# Patient Record
Sex: MALE | Race: WHITE | NOT HISPANIC OR LATINO | Employment: OTHER | URBAN - METROPOLITAN AREA
[De-identification: names, ages, dates, MRNs, and addresses within clinical notes are randomized per-mention and may not be internally consistent; named-entity substitution may affect disease eponyms.]

---

## 2017-05-16 RX ORDER — PREDNISONE 20 MG/1
TABLET ORAL
Qty: 15 TABLET | Refills: 0 | Status: SHIPPED | OUTPATIENT
Start: 2017-05-16 | End: 2018-08-20 | Stop reason: CLARIF

## 2017-06-26 ENCOUNTER — TELEPHONE (OUTPATIENT)
Dept: NEUROSURGERY | Facility: CLINIC | Age: 67
End: 2017-06-26

## 2017-06-26 DIAGNOSIS — M54.5 ACUTE MIDLINE LOW BACK PAIN, WITH SCIATICA PRESENCE UNSPECIFIED: Primary | ICD-10-CM

## 2017-06-26 NOTE — TELEPHONE ENCOUNTER
----- Message from Loly Trevino sent at 6/26/2017 12:19 PM CDT -----  Contact: pt [phone]  Pt is calling to schedule an appt.pls call

## 2017-06-26 NOTE — TELEPHONE ENCOUNTER
RN spoke with patient.Scheduled xray and appointment to see GRGEORIA Delgado. Location and directions provided [facility].

## 2017-06-30 ENCOUNTER — HOSPITAL ENCOUNTER (OUTPATIENT)
Dept: RADIOLOGY | Facility: OTHER | Age: 67
Discharge: HOME | End: 2017-06-30
Attending: NEUROLOGICAL SURGERY
Payer: COMMERCIAL

## 2017-06-30 ENCOUNTER — OFFICE VISIT (OUTPATIENT)
Dept: SPINE | Facility: CLINIC | Age: 67
End: 2017-06-30
Payer: COMMERCIAL

## 2017-06-30 VITALS
WEIGHT: 308 LBS | SYSTOLIC BLOOD PRESSURE: 143 MMHG | HEART RATE: 80 BPM | HEIGHT: 73 IN | DIASTOLIC BLOOD PRESSURE: 91 MMHG | BODY MASS INDEX: 40.82 KG/M2

## 2017-06-30 DIAGNOSIS — M54.14 THORACIC AND LUMBOSACRAL NEURITIS: ICD-10-CM

## 2017-06-30 DIAGNOSIS — M54.5 ACUTE MIDLINE LOW BACK PAIN, WITH SCIATICA PRESENCE UNSPECIFIED: ICD-10-CM

## 2017-06-30 DIAGNOSIS — G89.29 CHRONIC BILATERAL LOW BACK PAIN WITHOUT SCIATICA: ICD-10-CM

## 2017-06-30 DIAGNOSIS — M54.17 THORACIC AND LUMBOSACRAL NEURITIS: ICD-10-CM

## 2017-06-30 DIAGNOSIS — M54.50 CHRONIC BILATERAL LOW BACK PAIN WITHOUT SCIATICA: ICD-10-CM

## 2017-06-30 DIAGNOSIS — M47.819 SPONDYLOSIS WITHOUT MYELOPATHY: ICD-10-CM

## 2017-06-30 DIAGNOSIS — M51.37 DDD (DEGENERATIVE DISC DISEASE), LUMBOSACRAL: ICD-10-CM

## 2017-06-30 PROCEDURE — 72120 X-RAY BEND ONLY L-S SPINE: CPT | Mod: 26,,, | Performed by: RADIOLOGY

## 2017-06-30 PROCEDURE — 99214 OFFICE O/P EST MOD 30 MIN: CPT | Mod: S$GLB,,, | Performed by: PHYSICIAN ASSISTANT

## 2017-06-30 PROCEDURE — 72100 X-RAY EXAM L-S SPINE 2/3 VWS: CPT | Mod: 26,,, | Performed by: RADIOLOGY

## 2017-06-30 PROCEDURE — 99999 PR PBB SHADOW E&M-EST. PATIENT-LVL III: CPT | Mod: PBBFAC,,, | Performed by: PHYSICIAN ASSISTANT

## 2017-06-30 PROCEDURE — 72120 X-RAY BEND ONLY L-S SPINE: CPT | Mod: TC

## 2017-06-30 RX ORDER — METHOCARBAMOL 750 MG/1
750 TABLET, FILM COATED ORAL 3 TIMES DAILY PRN
Qty: 90 TABLET | Refills: 1 | Status: SHIPPED | OUTPATIENT
Start: 2017-06-30 | End: 2017-07-30

## 2017-06-30 RX ORDER — METHYLPREDNISOLONE 4 MG/1
TABLET ORAL
Qty: 1 PACKAGE | Refills: 0 | Status: SHIPPED | OUTPATIENT
Start: 2017-06-30 | End: 2017-07-21

## 2017-06-30 NOTE — PROGRESS NOTES
"Subjective:     Patient ID:  Jarrett Blanco is a 67 y.o. male.      Chief Complaint: Back and right leg pain    DATE OF PROCEDURE: 4/22/2015      PREOPERATIVE DIAGNOSES:   Spinal stenosis, lumbar region, without neurogenic claudication [724.02]     POSTOPERATIVE DIAGNOSES:   Spinal stenosis, lumbar region, without neurogenic claudication [724.02]     PROCEDURES PERFORMED:   Procedure(s) (LRB):  LAMINOTOMY- left L4/5 and L5/S1 laminoforaminotomy (Left)     Surgeon(s) and Role:     * Larry An MD - Primary  Assistant- Cait Vivar (there was no neurosurgery resident available to assist in the surgery)    HPI    Jarrett Blanco is a 67 y.o. male who presents for follow up.  He has persisted with back pain on and off since his surgery inn 2015.  About a week ago he woke up with severe pain in the low back that has been constant and worse with walking and standing and better with sitting.  He will take 2 Aleve which helps some and as the day goes on the pain eases up.  Some pain in the right lateral leg to the mid thigh region.      No PT or ESIs since his last surgery.  His low back pain limits what he can and can't do.    Pain rated 6/10.    Patient denies any recent accidents or trauma, no saddle anesthesias, and no bowel or bladder incontinence.      Review of Systems:  Constitution: Negative for chills, fever, night sweats and weight loss.   Musculoskeletal: Negative for falls.   Gastrointestinal: Negative for bowel incontinence, nausea and vomiting.   Genitourinary: Negative for bladder incontinence.   Neurological: Negative for disturbances in coordination and loss of balance.     Objective:      Vitals:    06/30/17 1445   BP: (!) 143/91   Pulse: 80   Weight: (!) 139.7 kg (308 lb)   Height: 6' 1" (1.854 m)   PainSc:   6   PainLoc: Back         Physical Exam:    General:  Jarrett Blanco is well-developed, well-nourished, appears stated age, in no acute distress, alert and oriented to person, place, and " time.    Pulmonary/Chest:  Respiratory effort normal  Abdominal: Exhibits no distension  Psychiatric:  Normal mood and affect.  Behavior is normal.  Judgement and thought content normal    Musculoskeletal:    Patient arises from a sitting to standing position without difficulty.  Patient walks to the door without evidence of limp, pain, or abnormality of gait. Patient is able to walk on heels and toes without difficulty.    Lumbar ROM:   Pain in lumbar flexion, extension, and left lateral bending.  No pain in right lateral bending.    Straight Leg Raise:  Negative right and left SLR.    Neurological:  Alert and oriented to person, place, and time    Muscle strength against resistance:     Right Left   Hip flexion  5 / 5 5 / 5   Hip extension 5 / 5 5 / 5   Hip abduction 5 / 5 5 / 5   Hip adduction  5 / 5 5 / 5   Knee extension  5 / 5 5 / 5   Knee flexion 5 / 5 5 / 5   Dorsiflexion  5 / 5 5 / 5   EHL  5 / 5 5 / 5   Plantar flexion  5 / 5 5 / 5   Inversion of the feet 5 / 5 5 / 5   Eversion of the feet  5 / 5 5 / 5     Reflexes:     Right Left   Patellar 2+ 2+   Achilles 2+ 2+     Clonus:  Negative bilaterally    On gross examination of the bilateral upper extremities, patient has full painfree ROM with no signs of clubbing, cyanosis, edema, or weakness.     XRAY Interpretation:     Lumbar spine ap/lateral/flexion/extension xrays were personally reviewed today.  No fractures.  No movement on flexion and extension.  Severe DDD at L4-5 and L5-S1.  Moderate DDD at L3-4.    Assessment:          1. Spondylosis without myelopathy    2. DDD (degenerative disc disease), lumbosacral    3. Thoracic and lumbosacral neuritis    4. Chronic bilateral low back pain without sciatica             Plan:          Orders Placed This Encounter    methylPREDNISolone (MEDROL, [place],) 4 mg tablet    methocarbamol (ROBAXIN) 750 MG Tab       L4-5, L5-S1 DDD/spondylosis.  Chronic low back pain with an acute exacerbation    -Medrol pack now with  food  -Continue Aleve OTC PRN with food once the pack is done  -Robaxin PRN now  -He is not interested in PT or ESIs in the future as they did not work for him in the past  -Consider MIS L4-5, L5-S1 and possible L3-4 spinal fusion.  If he wants to talk to Dr. An about this in the future I will order an updated CT lumbar spine without contrast and have him see Dr. An after to discuss surgical options    Follow-Up:  Return if symptoms worsen or fail to improve. If there are any questions prior to this, the patient was instructed to contact the office.       COLLEEN Mendiola, PA-C  Neurosurgery  [facility]  [facility]

## 2017-08-24 DIAGNOSIS — Z11.59 NEED FOR HEPATITIS C SCREENING TEST: ICD-10-CM

## 2018-05-30 ENCOUNTER — TELEPHONE (OUTPATIENT)
Dept: NEUROSURGERY | Facility: CLINIC | Age: 68
End: 2018-05-30

## 2018-05-30 NOTE — TELEPHONE ENCOUNTER
Offered appt, 7/10/18 at 930. Patient agreed to appt date, time and location. Appt letter mailed to address on file.

## 2018-05-30 NOTE — TELEPHONE ENCOUNTER
----- Message from Partha Ordonez sent at 5/30/2018  8:52 AM CDT -----  Contact: Maria G ( spouse ) @ [phone]  Caller is calling to schedule a f/u appt w Dr An to discuss surgery, pls call

## 2018-07-05 ENCOUNTER — TELEPHONE (OUTPATIENT)
Dept: SPINE | Facility: CLINIC | Age: 68
End: 2018-07-05

## 2018-07-05 DIAGNOSIS — M54.9 BACK PAIN, UNSPECIFIED BACK LOCATION, UNSPECIFIED BACK PAIN LATERALITY, UNSPECIFIED CHRONICITY: Primary | ICD-10-CM

## 2018-07-05 NOTE — TELEPHONE ENCOUNTER
I saw patient last year.  He needs updated CT lumbar spine scheduled Friday or Monday before his surgery consult with Dr. An this Tuesday.    Please schedule.    COLLEEN Mendiola, PA-C  Neurosurgery  [facility]  [facility]  Pager:  [phone]

## 2018-07-06 ENCOUNTER — HOSPITAL ENCOUNTER (OUTPATIENT)
Dept: RADIOLOGY | Facility: HOSPITAL | Age: 68
Discharge: HOME | End: 2018-07-06
Attending: PHYSICIAN ASSISTANT
Payer: MEDICARE

## 2018-07-06 DIAGNOSIS — M54.9 BACK PAIN, UNSPECIFIED BACK LOCATION, UNSPECIFIED BACK PAIN LATERALITY, UNSPECIFIED CHRONICITY: ICD-10-CM

## 2018-07-06 PROCEDURE — 72131 CT LUMBAR SPINE W/O DYE: CPT | Mod: 26,,, | Performed by: RADIOLOGY

## 2018-07-06 PROCEDURE — 72131 CT LUMBAR SPINE W/O DYE: CPT | Mod: TC

## 2018-07-10 ENCOUNTER — OFFICE VISIT (OUTPATIENT)
Dept: SPINE | Facility: CLINIC | Age: 68
End: 2018-07-10
Attending: NEUROLOGICAL SURGERY
Payer: MEDICARE

## 2018-07-10 VITALS
TEMPERATURE: 96 F | BODY MASS INDEX: 40.82 KG/M2 | SYSTOLIC BLOOD PRESSURE: 150 MMHG | HEIGHT: 73 IN | HEART RATE: 61 BPM | DIASTOLIC BLOOD PRESSURE: 82 MMHG | WEIGHT: 308 LBS

## 2018-07-10 DIAGNOSIS — M48.062 SPINAL STENOSIS OF LUMBAR REGION WITH NEUROGENIC CLAUDICATION: ICD-10-CM

## 2018-07-10 DIAGNOSIS — M51.36 DDD (DEGENERATIVE DISC DISEASE), LUMBAR: Primary | ICD-10-CM

## 2018-07-10 DIAGNOSIS — M43.17 SPONDYLOLISTHESIS OF LUMBOSACRAL REGION: ICD-10-CM

## 2018-07-10 PROCEDURE — 99215 OFFICE O/P EST HI 40 MIN: CPT | Mod: S$GLB,,, | Performed by: NEUROLOGICAL SURGERY

## 2018-07-10 PROCEDURE — 3079F DIAST BP 80-89 MM HG: CPT | Mod: CPTII,S$GLB,, | Performed by: NEUROLOGICAL SURGERY

## 2018-07-10 PROCEDURE — 99999 PR PBB SHADOW E&M-EST. PATIENT-LVL III: CPT | Mod: PBBFAC,,, | Performed by: NEUROLOGICAL SURGERY

## 2018-07-10 PROCEDURE — 3077F SYST BP >= 140 MM HG: CPT | Mod: CPTII,S$GLB,, | Performed by: NEUROLOGICAL SURGERY

## 2018-07-10 PROCEDURE — 99499 UNLISTED E&M SERVICE: CPT | Mod: S$GLB,,, | Performed by: NEUROLOGICAL SURGERY

## 2018-07-10 RX ORDER — NAPROXEN SODIUM 220 MG
220 TABLET ORAL DAILY PRN
Status: ON HOLD | COMMUNITY
End: 2018-09-13 | Stop reason: HOSPADM

## 2018-07-10 RX ORDER — HYDROCHLOROTHIAZIDE 12.5 MG/1
12.5 CAPSULE ORAL EVERY MORNING
COMMUNITY
Start: 2018-07-02 | End: 2020-09-28

## 2018-07-10 NOTE — PROGRESS NOTES
CHIEF COMPLAINT:    Follow-up: Low Back Pain    HPI:    Jarrett Blanco is a 68 y.o.-year-old male who presents for neurosurgical follow-up. GREGORIA Cervantes last saw Jarrett Blanco in June 2017, and at that time he was complaining of pain across the whole lower back. He was sent for  physical therapy, which did not improve the pain. The patient's symptoms are now worse. Today He is complaining of pain across the whole lower back moving into the L5 with associated paresthesias into the lateral right leg. He denies any new onset of weakness. The patient describes the pain as dull. The patient rates the pain 4 on the pain scale. The patient has now been suffering with this pain for 45 year(s), and it originally started with a sports injury, and increased gradually. The pain is worse  for no specific reason and better with lying down, sitting and elevating feet. The patient currently denies any changes in bowel or bladder control.      (Not in a hospital admission)    Review of patient's allergies indicates:  No Known Allergies    Past Medical History:   Diagnosis Date    Cancer     Kidney - right nephrectomy    Coronary artery disease     Hypertension     Pacemaker      Past Surgical History:   Procedure Laterality Date    CARDIAC PACEMAKER PLACEMENT      LAMINECTOMY  4/22/15    L4-S1 laminoforaminotomy    NEPHRECTOMY Right     SPINE SURGERY  1975    lumbar     Family History   Problem Relation Age of Onset    COPD Mother     Cancer Mother     Cancer Father      Social History   Substance Use Topics    Smoking status: Never Smoker    Smokeless tobacco: Current User    Alcohol use Yes      Comment: beer most days        Review of Systems:  Constitutional: no fever or chills, pain well controlled  Eyes: no visual changes  ENT: no nasal congestion or sore throat  Respiratory: no cough or shortness of breath  Cardiovascular: no chest pain or palpitations  Gastrointestinal: no nausea or vomiting,  tolerating diet  Genitourinary: no hematuria or dysuria  Integument/Breast: no rash or pruritis  Hematologic/Lymphatic: no easy bruising or lymphadenopathy  Musculoskeletal: no arthralgias or myalgias, positive for back pain  Neurological: no seizures or tremors  Behavioral/Psych: no auditory or visual hallucinations  Endocrine: no heat or cold intolerance    Review of Systems    OBJECTIVE:     Vital Signs (Most Recent)       Physical Exam:    Physical Exam:  Nursing note and vitals reviewed.    Constitutional: He appears well-developed and well-nourished.     Eyes: Pupils are equal, round, and reactive to light. Conjunctivae and EOM are normal.     Cardiovascular: Normal rate.     Abdominal: Soft. Bowel sounds are normal.     Skin: Skin displays no rash on trunk and no rash on extremities. Skin displays no lesions on trunk and no lesions on extremities.     Psych/Behavior: He is alert. He is oriented to person, place, and time. He has a normal mood and affect.     Musculoskeletal: Gait is normal.        Neck: Range of motion is full. Muscle strength is 5/5. Tone is normal.        Back: Range of motion is limited. Muscle strength is 5/5. Tone is normal.        Right Upper Extremities: Range of motion is full. Muscle strength is 5/5.        Left Upper Extremities: Range of motion is full. Muscle strength is 5/5. Tone is normal.       Right Lower Extremities: Range of motion is limited. Muscle strength is 4/5. Tone is normal.        Left Lower Extremities: Range of motion is full. Muscle strength is 5/5. Tone is normal.     Neurological:        DTRs: DTRs are DTRS NORMAL AND SYMMETRICnormal and symmetric.        Cranial nerves: Cranial nerve(s) II, III, IV, V, VI, VII, VIII, IX, X, XI and XII are intact.       Laboratory  None    Diagnostic Results:  X-Ray: Reviewed  CT: Reviewed  L-spine: Severe DDD L3-4, L4-5 and L5-S1 with HNP and severe foraminal stenoses    ASSESSMENT/PLAN:     Impression- symptomatic L3-4, L4-5  and L5-S1 severe DDD and spinal stenosis    Plan- Left L3-4 and L4-5 XLIF and MIS TLIF L5-S1 with L3-S1 perc instrumentation. All attendant risks, benefits and potential complications of the anticipated surgery were dicussed and patient wants to proceed with surgery

## 2018-07-10 NOTE — PROGRESS NOTES
Patient, Jarrett Blanco (MRN #[id number]), presented with a recorded BMI of 40.64 kg/m^2 consistent with the definition of morbid obesity (ICD-10 E66.01). The patient's morbid obesity was monitored, evaluated, addressed and/or treated. This addendum to the medical record is made on 07/10/2018.

## 2018-07-18 DIAGNOSIS — M48.062 SPINAL STENOSIS OF LUMBAR REGION WITH NEUROGENIC CLAUDICATION: ICD-10-CM

## 2018-07-18 DIAGNOSIS — M43.17 SPONDYLOLISTHESIS OF LUMBOSACRAL REGION: ICD-10-CM

## 2018-07-18 DIAGNOSIS — Z98.1 S/P LUMBAR SPINAL FUSION: ICD-10-CM

## 2018-07-18 DIAGNOSIS — M51.36 DDD (DEGENERATIVE DISC DISEASE), LUMBAR: Primary | ICD-10-CM

## 2018-08-13 ENCOUNTER — ANESTHESIA EVENT (OUTPATIENT)
Dept: SURGERY | Facility: HOSPITAL | Age: 68
DRG: 460 | End: 2018-08-13
Payer: MEDICARE

## 2018-08-13 ENCOUNTER — TELEPHONE (OUTPATIENT)
Dept: FAMILY MEDICINE | Facility: CLINIC | Age: 68
End: 2018-08-13

## 2018-08-13 DIAGNOSIS — Z01.818 PREOPERATIVE TESTING: Primary | ICD-10-CM

## 2018-08-13 DIAGNOSIS — M79.604 PAIN OF RIGHT LOWER EXTREMITY: ICD-10-CM

## 2018-08-13 NOTE — TELEPHONE ENCOUNTER
I was able to schedule a surgery clearance appt with Dr. Dawson on 8/29, is that fine or were you wanting to work him in on your schedule?

## 2018-08-13 NOTE — PRE-PROCEDURE INSTRUCTIONS
Preop instructions given. Hold asa, asa containing products, nsaids, vitamins and supplements one week prior to surgery. Will need medical clearance priro to surgery from your PCP, Dr. Luis Alfredo Sands.Please schedule an appt. Will also need labs, ua, ekg and poc appt. He said he recently saw his cardiologist, Dr. Hutson and had labs, stress test and ekg.  I told him that I would request these from Dr. Hutson's office. Our  will call to set up these appts.Verbalizes understanding.

## 2018-08-13 NOTE — PRE ADMISSION SCREENING
Anesthesia Assessment: Preoperative EQUATION    Planned Procedure: Procedure(s) (LRB):  Left L3-4 and L4-5 XLIF and MIS TLIF L5-S1 with L3-S1 perc instrumentation (Left)  Requested Anesthesia Type:General  Surgeon: Larry An MD  Service: Neurosurgery  Known or anticipated Date of Surgery:9/10/2018    Surgeon notes: reviewed    Electronic QUestionnaire Assessment completed via nurse interview with patient.      No aq    Triage considerations:         Previous anesthesia records:GETA and No problems  4/22/2015 laminotomy left L4/5 and L5/S1  Airway/Jaw/Neck:  Airway Findings: Mouth Opening: Normal General Airway Assessment: Possible difficult mask airway, Possible difficult intubation  Mallampati: III  TM Distance: 4 - 6 cm  Jaw/Neck Findings:  Neck ROM: Normal ROM  Neck Findings:  Girth Increased        Last PCP note: 6-12 months ago , within [facility]   Subspecialty notes: spine service    Other important co-morbidities: per epic: HTN, CAD, MORBID OBESITY,     Tests already available:  No recent tests.            Instructions given. (See in Nurse's note)    Optimization:  Anesthesia Preop Clinic Assessment  Indicated    Medical Opinion Indicated           Plan:    Testing:  CBC, CMP, PT/INR, PTT, T&S, EKG and UA   Pre-anesthesia  visit       Visit focus: concerns in complex and/or prolonged anesthesia     Consultation:Patient's PCP for re-evaluation Patient's PCP for a statement of optimization      Patient  has previously scheduled Medical Appointment:NONE    Navigation: Tests Scheduled.TBD              Consults scheduled.TBD             Results will be tracked by [facility].

## 2018-08-13 NOTE — TELEPHONE ENCOUNTER
That is OK with me if the patient is OK with it. He will also definitely need cardiology clearance besides ours, preferably before our visit.

## 2018-08-13 NOTE — TELEPHONE ENCOUNTER
Called patient and advised of surg clearance appt with Dr. Dawson. Patient was ok with appt, he stated that he had already seen cardiology for clearance. Will request their notes and any testing that was done so we can have for the appt. Fax sent to cardiology office.

## 2018-08-13 NOTE — ANESTHESIA PREPROCEDURE EVALUATION
Anesthesia Assessment: Preoperative EQUATION     Planned Procedure: Procedure(s) (LRB):  Left L3-4 and L4-5 XLIF and MIS TLIF L5-S1 with L3-S1 perc instrumentation (Left)  Requested Anesthesia Type:General  Surgeon: Larry An MD  Service: Neurosurgery  Known or anticipated Date of Surgery:9/10/2018     Surgeon notes: reviewed     Electronic QUestionnaire Assessment completed via nurse interview with patient.      No aq     Triage considerations:            Previous anesthesia records:GETA and No problems  4/22/2015 laminotomy left L4/5 and L5/S1  Airway/Jaw/Neck:  Airway Findings: Mouth Opening: Normal General Airway Assessment: Possible difficult mask airway, Possible difficult intubation  Mallampati: III  TM Distance: 4 - 6 cm  Jaw/Neck Findings:  Neck ROM: Normal ROM  Neck Findings:  Girth Increased         Last PCP note: 6-12 months ago , within [facility]   Subspecialty notes: spine service     Other important co-morbidities: per epic: HTN, CAD, MORBID OBESITY,     Tests already available:  No recent tests.                            Instructions given. (See in Nurse's note)     Optimization:  Anesthesia Preop Clinic Assessment  Indicated    Medical Opinion Indicated                                        Plan:    Testing:  CBC, CMP, PT/INR, PTT, T&S, EKG and UA   Pre-anesthesia  visit                                        Visit focus: concerns in complex and/or prolonged anesthesia                           Consultation:Patient's PCP for re-evaluation Patient's PCP for a statement of optimization                            Patient  has previously scheduled Medical Appointment:NONE     Navigation: Tests Scheduled.TBD                         Consults scheduled.TBD                        Results will be tracked by [facility].                                     Electronically signed by Mary Gross RN at 8/13/2018 12:30 PM       Pre-admit on 9/10/2018            Detailed Report                                                                                                                        08/13/2018  Jarrett Blanco is a 68 y.o., male.    Anesthesia Evaluation         Review of Systems  Anesthesia Hx:  No problems with previous Anesthesia History of prior surgery of interest to airway management or planning: Previous anesthesia: General back surgery 4/2015 laminotomy with general anesthesia.  Procedure performed at an [facility]. Denies Family Hx of Anesthesia complications.    Social:  Non-Smoker, Social Alcohol Use Chews tobacco   Hematology/Oncology:  Hematology Normal       -- Cancer in past history (R kidney s/p R nephrectomy 2007):    EENT/Dental:EENT/Dental Normal   Cardiovascular:   Pacemaker (BIOTRONIC; originally placed 1991 at age of 41 yrs for SSS; new biotronic DDD pulse generator 2005) Hypertension CAD   hyperlipidemia  Functional Capacity good / => 4 METS, limited due to back pain; occas cuts grass; climb 1 FOS; denies CP, occas SOB    Pulmonary:   Shortness of breath Denies Sleep Apnea.    Renal/:   SINGLE KIDNEY STATUS: H/o kidney cancer s/p R nephrectomy 2007   Hepatic/GI:   Denies GERD.    Musculoskeletal:   Arthritis   Spine Disorders: lumbar Degenerative disease and Disc disease    Neurological:  Pain , onset is chronic , location of lower back , quality of aching/dull , radiating to both legs , severity is a 6    Endocrine:   Denies Diabetes.    Psych:  Psychiatric Normal           Physical Exam  General:  Obesity    Airway/Jaw/Neck:  Airway Findings: Mouth Opening: Normal Tongue: Normal  General Airway Assessment: Adult  Mallampati: III  Jaw/Neck Findings:     Neck ROM: Extension Decreased, Mod., Decreased Lateral Motion, to the right, to the left  Neck Findings:  Girth Increased, Short Neck      Dental:  Dental Findings:    Chest/Lungs:  Chest/Lungs Findings: (decreased breath sounds at bases) Clear to auscultation     Heart/Vascular:  Heart Findings: Rate: Normal  Rhythm:  Regular Rhythm  Sounds: Normal  Vascular Findings:  Edema Locations: BLE  Edema: +1 or +2        Mental Status:  Mental Status Findings:  Cooperative, Alert and Oriented       Pt was seen in POC 8/20/18; POSSIBLE DIFFICULT INTUBATION (GLIDESCOPE USED IN 2015); pending PCP clearance; outside cardiology notes and Lexiscan (5/14/18), EKG reports in media/Greta Bar RN      ADDENDUM 9/6/18 1657: PER PCP, DR MARTINEZ:  Pre-op exam      Current Assessment & Plan       Labs recently performed. WNL     Seen by cards. On May 2018  Stress test by doctor Caryl shows stress ecg demonstrates no new ecg changes of ischemia.  He had recent ecg that showed sinus rhythm with IVCD, same as stress test.     LVEF of 60%.  Have addressed risks with pt based on obesity, past cardiac history, etc.  Pt is amenable understands the associated risk.     Pt cleared for surgery with RCRI risk of 1 with 0.9% risk of major cardiac event.        /Greta Bar RN          Anesthesia Plan  Type of Anesthesia, risks & benefits discussed:  Anesthesia Type:  general  Patient's Preference:   Intra-op Monitoring Plan:   Intra-op Monitoring Plan Comments:   Post Op Pain Control Plan:   Post Op Pain Control Plan Comments:   Induction:   IV  Beta Blocker:  Patient is on a Beta-Blocker and has received one dose within the past 24 hours (No further documentation required).       Informed Consent: Patient understands risks and agrees with Anesthesia plan.  Questions answered. Anesthesia consent signed with patient.  ASA Score: 4     Day of Surgery Review of History & Physical:    H&P update referred to the surgeon.         Ready For Surgery From Anesthesia Perspective.

## 2018-08-13 NOTE — TELEPHONE ENCOUNTER
----- Message from Mary Gross RN sent at 8/13/2018  1:21 PM CDT -----  Patient is scheduled for Left L3-4 and L4-5 XLIF and MIS TLIF L5-S1 with L3-S1 perc instrumentation with Dr. An on 9/10.( approximately 330 minutes of general anesthesia) Patient will need medical clearance for this surgery by Dr. Sands. Please schedule an appt. Thanks!  Mary Gross RN BSN  [facility]

## 2018-08-14 ENCOUNTER — TELEPHONE (OUTPATIENT)
Dept: PREADMISSION TESTING | Facility: HOSPITAL | Age: 68
End: 2018-08-14

## 2018-08-15 NOTE — PRE-PROCEDURE INSTRUCTIONS
Spoke with patient that had received labs note, and testing from 's office. The labs are too long ago for this surgery. Will need labs, ua and ekg. Our  will call to set up these tests. Verbalizes understanding.

## 2018-08-16 ENCOUNTER — PATIENT OUTREACH (OUTPATIENT)
Dept: ADMINISTRATIVE | Facility: HOSPITAL | Age: 68
End: 2018-08-16

## 2018-08-20 ENCOUNTER — HOSPITAL ENCOUNTER (OUTPATIENT)
Dept: CARDIOLOGY | Facility: CLINIC | Age: 68
Discharge: HOME | End: 2018-08-20
Attending: ANESTHESIOLOGY
Payer: MEDICARE

## 2018-08-20 ENCOUNTER — HOSPITAL ENCOUNTER (OUTPATIENT)
Dept: PREADMISSION TESTING | Facility: HOSPITAL | Age: 68
Discharge: HOME | End: 2018-08-20
Attending: ANESTHESIOLOGY
Payer: MEDICARE

## 2018-08-20 VITALS
TEMPERATURE: 98 F | BODY MASS INDEX: 41.75 KG/M2 | OXYGEN SATURATION: 96 % | HEART RATE: 67 BPM | WEIGHT: 315 LBS | HEIGHT: 73 IN | SYSTOLIC BLOOD PRESSURE: 143 MMHG | RESPIRATION RATE: 18 BRPM | DIASTOLIC BLOOD PRESSURE: 78 MMHG

## 2018-08-20 DIAGNOSIS — Z01.818 PREOPERATIVE TESTING: ICD-10-CM

## 2018-08-20 PROCEDURE — 93000 ELECTROCARDIOGRAM COMPLETE: CPT | Mod: S$GLB,,, | Performed by: INTERNAL MEDICINE

## 2018-08-20 NOTE — DISCHARGE INSTRUCTIONS
Your surgery has been scheduled for:__________________________________________    You should report to:  ____[facility], located on the [place] side of the first floor of the           [facility] ([phone])  ____[facility], located on the [facility] side of the            Second floor of the [facility] ([phone])  ____3rd Floor [facility] located on the [facility] side of the [facility] [phone]  Please Note   - Tell your doctor if you take Aspirin, products containing Aspirin, herbal medications  or blood thinners, such as Coumadin, Ticlid, or Plavix.  (Consult your provider regarding holding or stopping before surgery).  - Arrange for someone to drive you home following surgery.  You will not be allowed to leave the surgical facility alone or drive yourself home following sedation and anesthesia.  Before Surgery  - Stop taking all herbal medications 14days prior to surgery  - No Motrin/Advil (Ibuprofen) 7 days before surgery  - No Aleve (Naproxen) 7 days before surgery  - Stop Taking Asprin, products containing Asprin _____days before surgery  - Stop taking blood thinners_______days before surgery  - No Goody's/BC  Powder 7 days before surgery  - Refrain from drinking alcoholic beverages for 24hours before and after surgery  - Stop or limit smoking _________days before surgery  - You may take Tylenol for pain  Night before Surgery  NOTHING TO EAT OR DRINK AFTER MIDNIGHT (OR FOLLOW SURGEON'S INSTRUCTIONS)  - Take a shower or bath (shower is recommended).  Bathe with Hibiclens soap or an antibacterial soap from the neck down.  If not supplied by your surgeon, hibiclens soap will need to be purchased over the counter in pharmacy.  Rinse soap off thoroughly.  - Shampoo your hair with your regular shampoo  The Day of Surgery  ·  If you are told to take medication on the morning of surgery, it may be taken with  a sip of water.   - Take another bath or shower with hibiclens or any antibacterial soap, to reduce the chance of infection.  - Take heart and blood pressure medications with a small sip of water, as advised by the perioperative team.  - Do not take fluid pills  - You may brush your teeth and rinse your mouth, but do not swall any additional water.   - Do not apply perfumes, powder, body lotions or deodorant on the day of surgery.  - Nail polish should be removed.  - Do not wear makeup or moisturizer  - Wear comfortable clothes, such as a button front shirt and loose fitting pants.  - Leave all jewelry, including body piercings, and valuables at home.    - Bring any devices you will neeed after surgery such as crutches or canes.  - If you have sleep apnea, please bring your CPAP machine  In the event that your physical condition changes including the onset of a cold or respiratory illness, or if you have to delay or cancel your surgery, please notify your surgeon.  Anesthesia: General Anesthesia  Youre due to have surgery. During surgery, youll be given medication called anesthesia. (It is also called anesthetic.) This will keep you comfortable and pain-free. Your anesthesia provider will use general anesthesia. This sheet tells you more about it.  What is general anesthesia?     You are watched continuously during your procedure by the anesthesia provider   General anesthesia puts you into a state like deep sleep. It goes into the bloodstream (IV anesthetics), into the lungs (gas anesthetics), or both. You feel nothing during the procedure. You will not remember it. During the procedure, the anesthesia provider monitors you continuously. He or she checks your heart rate and rhythm, blood pressure, breathing, and blood oxygen.  · IV Anesthetics. IV anesthetics are given through an IV line in your arm. Theyre often given first. This is so you are asleep before a gas anesthetic is started. Some kinds of IV  anesthetics relieve pain. Others relax you. Your doctor will decide which kind is best in your case.  · Gas Anesthetics. Gas anesthetics are breathed into the lungs. They are often used to keep you asleep. They can be given through a facemask or a tube placed in your larynx or trachea (breathing tube).  ? If you have a facemask, your anesthesia provider will most likely place it over your nose and mouth while youre still awake. Youll breathe oxygen through the mask as your IV anesthetic is started. Gas anesthetic may be added through the mask.  ? If you have a tube in the larynx or trachea, it will be inserted into your throat after youre asleep.  Anesthesia tools and medications  You will likely have:  · IV anesthetics. These are put into an IV line into your bloodstream.  · Gas anesthetics. You breathe these anesthetics into your lungs, where they pass into your bloodstream.  · Pulse oximeter. This is a small clip that is attached to the end of your finger. This measures your blood oxygen level.  · Electrocardiography leads (electrodes). These are small sticky pads that are placed on your chest. They record your heart rate and rhythm.  · Blood pressure cuff. This reads your blood pressure.  Risks and possible complications  General anesthesia has some risks. These include:  · Breathing problems  · Nausea and vomiting  · Sore throat or hoarseness (usually temporary)  · Allergic reaction to the anesthetic  · Irregular heartbeat (rare)  · Cardiac arrest (rare)   Anesthesia safety  · Follow all instructions you are given for how long not to eat or drink before your procedure.  · Be sure your doctor knows what medications and drugs you take. This includes over-the-counter medications, herbs, supplements, alcohol or other drugs. You will be asked when those were last taken.  · Have an adult family member or friend drive you home after the procedure.  · For the first 24 hours after your surgery:  ? Do not drive or use  heavy equipment.  ? Have a trusted family member or spouse make important decisions or sign documents.  ? Avoid alcohol.  ? Have a responsible adult stay with you. He or she can watch for problems and help keep you safe.  Date Last Reviewed: 10/16/2014  © 6680-1287 [employer]. [address] [place] [address]. All rights reserved. This information is not intended as a substitute for professional medical care. Always follow your healthcare professional's instructions.

## 2018-08-27 ENCOUNTER — PATIENT MESSAGE (OUTPATIENT)
Dept: FAMILY MEDICINE | Facility: CLINIC | Age: 68
End: 2018-08-27

## 2018-08-29 ENCOUNTER — TELEPHONE (OUTPATIENT)
Dept: INTERNAL MEDICINE | Facility: CLINIC | Age: 68
End: 2018-08-29

## 2018-08-29 ENCOUNTER — OFFICE VISIT (OUTPATIENT)
Dept: FAMILY MEDICINE | Facility: CLINIC | Age: 68
End: 2018-08-29
Payer: MEDICARE

## 2018-08-29 VITALS
HEART RATE: 75 BPM | DIASTOLIC BLOOD PRESSURE: 81 MMHG | BODY MASS INDEX: 41.75 KG/M2 | SYSTOLIC BLOOD PRESSURE: 129 MMHG | HEIGHT: 73 IN | TEMPERATURE: 99 F | WEIGHT: 315 LBS

## 2018-08-29 DIAGNOSIS — Z01.818 PRE-OP EXAM: ICD-10-CM

## 2018-08-29 DIAGNOSIS — M48.061 SPINAL STENOSIS, LUMBAR REGION, WITHOUT NEUROGENIC CLAUDICATION: Primary | ICD-10-CM

## 2018-08-29 PROCEDURE — 99213 OFFICE O/P EST LOW 20 MIN: CPT | Mod: S$GLB,,, | Performed by: FAMILY MEDICINE

## 2018-08-29 PROCEDURE — 3074F SYST BP LT 130 MM HG: CPT | Mod: CPTII,S$GLB,, | Performed by: FAMILY MEDICINE

## 2018-08-29 PROCEDURE — 3079F DIAST BP 80-89 MM HG: CPT | Mod: CPTII,S$GLB,, | Performed by: FAMILY MEDICINE

## 2018-08-29 PROCEDURE — 99999 PR PBB SHADOW E&M-EST. PATIENT-LVL III: CPT | Mod: PBBFAC,,, | Performed by: FAMILY MEDICINE

## 2018-08-29 NOTE — H&P (VIEW-ONLY)
Subjective:       Patient ID: Jarrett Blanco is a 68 y.o. male.    Chief Complaint: Pre-op Exam    Pt has history of pacemaker placement, renal cancer with removal of right kidney.  He has Nonspecific intraventricular conduction delay on ekg.  Pt seen by cardiology -  See scanned records.      Review of Systems   Constitutional: Negative for activity change.   HENT: Negative for ear pain.    Eyes: Negative for pain.   Respiratory: Negative for shortness of breath.    Cardiovascular: Negative for chest pain.   Gastrointestinal: Negative for abdominal pain.   Genitourinary: Negative for dysuria.   Musculoskeletal: Negative for neck pain.   Skin: Negative for rash.   Neurological: Negative for headaches.       Objective:      Physical Exam   Constitutional: He appears well-developed and well-nourished. No distress.   HENT:   Head: Normocephalic and atraumatic.   Cardiovascular: Normal rate and regular rhythm.   Pacemaker present   Pulmonary/Chest: Effort normal and breath sounds normal. No respiratory distress. He has no wheezes.   Abdominal: Soft. Bowel sounds are normal. There is no tenderness.   Musculoskeletal: He exhibits edema.   Neurological: He is alert.   Skin: Skin is warm and dry. No rash noted. He is not diaphoretic. No erythema.   Nursing note and vitals reviewed.      Assessment:       1. Spinal stenosis, lumbar region, without neurogenic claudication    2. Pre-op exam    3. BMI 40.0-44.9, adult        Plan:     Problem List Items Addressed This Visit        Neuro    Spinal stenosis, lumbar region, without neurogenic claudication - Primary    Current Assessment & Plan     Has plan for upcoming surgery.            Endocrine    BMI 40.0-44.9, adult    Current Assessment & Plan     Do not eat starches. (nothing white)  Stop sugary snacks,  Stop bottled juices, or sodas.  See if you can lose any weight by stopping these items first, then we will re-visit the issue.  Glycemic Index Diet Handout given to pt  with explanation.              Other    Pre-op exam    Current Assessment & Plan     Labs recently performed. WNL    Seen by cards. On May 2018  Stress test by doctor Caryl shows stress ecg demonstrates no new ecg changes of ischemia.  He had recent ecg that showed sinus rhythm with IVCD, same as stress test.    LVEF of 60%.  Have addressed risks with pt based on obesity, past cardiac history, etc.  Pt is amenable understands the associated risk.    Pt cleared for surgery with RCRI risk of 1 with 0.9% risk of major cardiac event.

## 2018-08-29 NOTE — TELEPHONE ENCOUNTER
Message sent to back to Tanner Sorto to please forward message to nurse at [facility].

## 2018-08-29 NOTE — TELEPHONE ENCOUNTER
----- Message from Tanner Sorto sent at 8/29/2018  4:07 PM CDT -----  Patient clearance request was sent to the cardiology insitute and they have no idea what clearance is for. Please fax to: [phone]    [facility] [phone]

## 2018-08-29 NOTE — ASSESSMENT & PLAN NOTE
Labs recently performed. WNL    Seen by cards. On May 2018  Stress test by doctor Caryl shows stress ecg demonstrates no new ecg changes of ischemia.  He had recent ecg that showed sinus rhythm with IVCD, same as stress test.    LVEF of 60%.  Have addressed risks with pt based on obesity, past cardiac history, etc.  Pt is amenable understands the associated risk.    Pt cleared for surgery with RCRI risk of 1 with 0.9% risk of major cardiac event.

## 2018-08-29 NOTE — ASSESSMENT & PLAN NOTE
Do not eat starches. (nothing white)  Stop sugary snacks,  Stop bottled juices, or sodas.  See if you can lose any weight by stopping these items first, then we will re-visit the issue.  Glycemic Index Diet Handout given to pt with explanation.

## 2018-09-05 ENCOUNTER — TELEPHONE (OUTPATIENT)
Dept: NEUROSURGERY | Facility: CLINIC | Age: 68
End: 2018-09-05

## 2018-09-05 NOTE — TELEPHONE ENCOUNTER
SW pt and answered surgery questions. Instructed him that someone would contact him on Friday to provide arrival time. Pt verbalizes understanding.       ----- Message from Renetta Juarez sent at 9/5/2018 11:14 AM CDT -----            Name of Who is Calling: Maria G(wife)      What is the request in detail: Pt's wife states the pt is scheduled for surgery on Monday and they can't find the directions for the upcoming procedure(waht medications he can/cannot have,etc.). Please call to discuss.    Can the clinic reply by [facility]: yes      What Number to Call Back if not in [facility]: [phone] or [phone]

## 2018-09-07 ENCOUNTER — TELEPHONE (OUTPATIENT)
Dept: SPINE | Facility: CLINIC | Age: 68
End: 2018-09-07

## 2018-09-07 DIAGNOSIS — M48.062 SPINAL STENOSIS, LUMBAR REGION WITH NEUROGENIC CLAUDICATION: ICD-10-CM

## 2018-09-07 DIAGNOSIS — Z98.1 S/P LUMBAR SPINAL FUSION: Primary | ICD-10-CM

## 2018-09-07 DIAGNOSIS — M43.17 SPONDYLOLISTHESIS, LUMBOSACRAL REGION: ICD-10-CM

## 2018-09-07 DIAGNOSIS — M51.36 DDD (DEGENERATIVE DISC DISEASE), LUMBAR: ICD-10-CM

## 2018-09-07 NOTE — TELEPHONE ENCOUNTER
QUOC pt. Advising him on arrival time and location for surgery 9/10/18. NPO after 10PM, shower with Hibiclens night before and morning of surgery. Pt mentions he has not been fitted for back brace. Will FU with DME. Pt verbalizes understanding.

## 2018-09-09 NOTE — H&P
[facility]  Neurosurgery  History & Physical    Patient Name: Jarrett Blanco  MRN: [id number]  Admission Date: (Not on file)  Attending Physician: Larry An MD   Primary Care Provider: Luis Alfredo Sands Jr, MD    Patient information was obtained from patient, past medical records and ER records.     Subjective:     Chief Complaint/Reason for Admission: LBP presenting for lumbar spinal fusion    History of Present Illness:     The following HPI is directly copied from Dr. An's office visit 7/10/18:    Jarrett Blanco is a 68 y.o.-year-old male who presents for neurosurgical follow-up. GREGORIA Cervantes last saw Jarrett Blanco in June 2017, and at that time he was complaining of pain across the whole lower back. He was sent for  physical therapy, which did not improve the pain. The patient's symptoms are now worse. Today He is complaining of pain across the whole lower back moving into the L5 with associated paresthesias into the lateral right leg. He denies any new onset of weakness. The patient describes the pain as dull. The patient rates the pain 4 on the pain scale. The patient has now been suffering with this pain for 45 year(s), and it originally started with a sports injury, and increased gradually. The pain is worse  for no specific reason and better with lying down, sitting and elevating feet. The patient currently denies any changes in bowel or bladder control.    In addendum to the above HPI, the patient describes no new symptomology. He denies any progression or improvement of symptoms. He states that he has not eaten since midnight and has not taken any anti-plt/coag medication in the last 3 days.    No medications prior to admission.       Review of patient's allergies indicates:  No Known Allergies    Past Medical History:   Diagnosis Date    Cancer     Kidney - right nephrectomy    Coronary artery disease     Hypertension     Pacemaker      Past Surgical History:    Procedure Laterality Date    CARDIAC PACEMAKER PLACEMENT      LAMINECTOMY  4/22/15    L4-S1 laminoforaminotomy    LAMINOTOMY- left L4/5 and L5/S1 laminoforaminotomy Left 4/22/2015    [facility]    NEPHRECTOMY Right     SPINE SURGERY  1975    lumbar     Family History     Problem Relation (Age of Onset)    COPD Mother    Cancer Mother, Father        Tobacco Use    Smoking status: Never Smoker    Smokeless tobacco: Current User   Substance and Sexual Activity    Alcohol use: Yes     Comment: beer most days    Drug use: Not on file    Sexual activity: Not on file     Review of Systems  Objective:   Constitutional: no fever or chills, pain well controlled  Eyes: no visual changes  ENT: no nasal congestion or sore throat  Respiratory: no cough or shortness of breath  Cardiovascular: no chest pain or palpitations  Gastrointestinal: no nausea or vomiting, tolerating diet  Genitourinary: no hematuria or dysuria  Integument/Breast: no rash or pruritis  Hematologic/Lymphatic: no easy bruising or lymphadenopathy  Musculoskeletal: no arthralgias or myalgias, positive for back pain  Neurological: no seizures or tremors  Behavioral/Psych: no auditory or visual hallucinations  Endocrine: no heat or cold intolerance     There is no height or weight on file to calculate BMI.  Vital Signs (Most Recent):    Vital Signs (24h Range):                     Neurosurgery Physical Exam  Constitutional: He appears well-developed and well-nourished.      Eyes: Pupils are equal, round, and reactive to light. Conjunctivae and EOM are normal.      Cardiovascular: Normal rate.      Abdominal: Soft. Bowel sounds are normal.      Skin: Skin displays no rash on trunk and no rash on extremities. Skin displays no lesions on trunk and no lesions on extremities.     Psych/Behavior: He is alert. He is oriented to person, place, and time. He has a normal mood and affect.     Musculoskeletal: Gait is normal.         Neck: Range of motion is full. Muscle strength is 5/5. Tone is normal.        Back: Range of motion is limited. Muscle strength is 5/5. Tone is normal.        Right Upper Extremities: Range of motion is full. Muscle strength is 5/5.        Left Upper Extremities: Range of motion is full. Muscle strength is 5/5. Tone is normal.       Right Lower Extremities: Range of motion is limited. Muscle strength is 4/5. Tone is normal.        Left Lower Extremities: Range of motion is full. Muscle strength is 5/5. Tone is normal.     Neurological:        DTRs: DTRs are DTRS NORMAL AND SYMMETRICnormal and symmetric.        Cranial nerves: Cranial nerve(s) II, III, IV, V, VI, VII, VIII, IX, X, XI and XII are intact.     Significant Labs:  No results for input(s): GLU, NA, K, CL, CO2, BUN, CREATININE, CALCIUM, MG in the last 48 hours.  No results for input(s): WBC, HGB, HCT, PLT in the last 48 hours.  No results for input(s): LABPT, INR, APTT in the last 48 hours.  Microbiology Results (last 7 days)     ** No results found for the last 168 hours. **        ABGs: No results for input(s): PH, PCO2, PO2, HCO3, POCSATURATED, BE in the last 48 hours.  Cardiac markers: No results for input(s): CKMB, CPKMB, TROPONINT, TROPONINI, MYOGLOBIN in the last 48 hours.  CMP: No results for input(s): GLU, CALCIUM, ALBUMIN, PROT, NA, K, CO2, CL, BUN, CREATININE, ALKPHOS, ALT, AST, BILITOT in the last 48 hours.  CRP: No results for input(s): CRP in the last 48 hours.  ESR: No results for input(s): POCESR, ERYTHROCYTES in the last 48 hours.  LFTs: No results for input(s): ALT, AST, ALKPHOS, BILITOT, PROT, ALBUMIN in the last 48 hours.  Procalcitonin: No results for input(s): PROCAL in the last 48 hours.    Significant Diagnostics:  I have reviewed and interpreted all pertinent imaging results/findings within the past 24 hours.    Assessment/Plan:   68M w/ PMH nephrectomy from kidney CA, CAD w/ AICD, HTN who presents for elective L3-S1 spine fusion  surgery:    --Patient evaluated pre-operatively and all questions were answered  --All diagnostics and labs were reviewed prior to surgery  --Patient is NPO since midnight and has not taken any anti-plt/coag meds in 72h  --Reccs to follow OR today    There are no hospital problems to display for this patient.      Jason Guillory MD  Neurosurgery  [facility]

## 2018-09-10 ENCOUNTER — HOSPITAL ENCOUNTER (INPATIENT)
Facility: HOSPITAL | Age: 68
LOS: 3 days | Discharge: HOME | DRG: 460 | End: 2018-09-13
Attending: NEUROLOGICAL SURGERY | Admitting: NEUROLOGICAL SURGERY
Payer: MEDICARE

## 2018-09-10 ENCOUNTER — ANESTHESIA (OUTPATIENT)
Dept: SURGERY | Facility: HOSPITAL | Age: 68
DRG: 460 | End: 2018-09-10
Payer: MEDICARE

## 2018-09-10 DIAGNOSIS — M48.062 LUMBAR STENOSIS WITH NEUROGENIC CLAUDICATION: Primary | ICD-10-CM

## 2018-09-10 DIAGNOSIS — Z01.818 PREOPERATIVE TESTING: ICD-10-CM

## 2018-09-10 DIAGNOSIS — M43.17 SPONDYLOLISTHESIS OF LUMBOSACRAL REGION: ICD-10-CM

## 2018-09-10 LAB
ABO + RH BLD: NORMAL
ANION GAP SERPL CALC-SCNC: 10 MMOL/L
ANION GAP SERPL CALC-SCNC: 12 MMOL/L
APTT BLDCRRT: 25.4 SEC
BASOPHILS # BLD AUTO: 0.03 K/UL
BASOPHILS # BLD AUTO: 0.05 K/UL
BASOPHILS NFR BLD: 0.2 %
BASOPHILS NFR BLD: 0.6 %
BLD GP AB SCN CELLS X3 SERPL QL: NORMAL
BUN SERPL-MCNC: 11 MG/DL
BUN SERPL-MCNC: 12 MG/DL
CALCIUM SERPL-MCNC: 8.3 MG/DL
CALCIUM SERPL-MCNC: 9.5 MG/DL
CHLORIDE SERPL-SCNC: 97 MMOL/L
CHLORIDE SERPL-SCNC: 98 MMOL/L
CO2 SERPL-SCNC: 20 MMOL/L
CO2 SERPL-SCNC: 24 MMOL/L
CREAT SERPL-MCNC: 0.9 MG/DL
CREAT SERPL-MCNC: 1 MG/DL
DIFFERENTIAL METHOD: ABNORMAL
DIFFERENTIAL METHOD: ABNORMAL
EOSINOPHIL # BLD AUTO: 0 K/UL
EOSINOPHIL # BLD AUTO: 0.3 K/UL
EOSINOPHIL NFR BLD: 0 %
EOSINOPHIL NFR BLD: 3.9 %
ERYTHROCYTE [DISTWIDTH] IN BLOOD BY AUTOMATED COUNT: 13.3 %
ERYTHROCYTE [DISTWIDTH] IN BLOOD BY AUTOMATED COUNT: 13.3 %
EST. GFR  (AFRICAN AMERICAN): >60 ML/MIN/1.73 M^2
EST. GFR  (AFRICAN AMERICAN): >60 ML/MIN/1.73 M^2
EST. GFR  (NON AFRICAN AMERICAN): >60 ML/MIN/1.73 M^2
EST. GFR  (NON AFRICAN AMERICAN): >60 ML/MIN/1.73 M^2
GLUCOSE SERPL-MCNC: 113 MG/DL
GLUCOSE SERPL-MCNC: 137 MG/DL (ref 70–110)
GLUCOSE SERPL-MCNC: 165 MG/DL
HCO3 UR-SCNC: 25.4 MMOL/L (ref 24–28)
HCT VFR BLD AUTO: 40.6 %
HCT VFR BLD AUTO: 43.9 %
HCT VFR BLD CALC: 40 %PCV (ref 36–54)
HGB BLD-MCNC: 13.1 G/DL
HGB BLD-MCNC: 14.1 G/DL
IMM GRANULOCYTES # BLD AUTO: 0.03 K/UL
IMM GRANULOCYTES # BLD AUTO: 0.12 K/UL
IMM GRANULOCYTES NFR BLD AUTO: 0.4 %
IMM GRANULOCYTES NFR BLD AUTO: 0.8 %
INR PPP: 1
LYMPHOCYTES # BLD AUTO: 0.8 K/UL
LYMPHOCYTES # BLD AUTO: 2.1 K/UL
LYMPHOCYTES NFR BLD: 26.3 %
LYMPHOCYTES NFR BLD: 5.2 %
MCH RBC QN AUTO: 28.3 PG
MCH RBC QN AUTO: 28.9 PG
MCHC RBC AUTO-ENTMCNC: 32.1 G/DL
MCHC RBC AUTO-ENTMCNC: 32.3 G/DL
MCV RBC AUTO: 88 FL
MCV RBC AUTO: 89 FL
MONOCYTES # BLD AUTO: 0.2 K/UL
MONOCYTES # BLD AUTO: 0.7 K/UL
MONOCYTES NFR BLD: 1.4 %
MONOCYTES NFR BLD: 8.7 %
NEUTROPHILS # BLD AUTO: 14.5 K/UL
NEUTROPHILS # BLD AUTO: 4.7 K/UL
NEUTROPHILS NFR BLD: 60.1 %
NEUTROPHILS NFR BLD: 92.4 %
NRBC BLD-RTO: 0 /100 WBC
NRBC BLD-RTO: 0 /100 WBC
PCO2 BLDA: 45.2 MMHG (ref 35–45)
PH SMN: 7.36 [PH] (ref 7.35–7.45)
PLATELET # BLD AUTO: 244 K/UL
PLATELET # BLD AUTO: 263 K/UL
PMV BLD AUTO: 8.5 FL
PMV BLD AUTO: 8.9 FL
PO2 BLDA: 118 MMHG (ref 80–100)
POC BE: 0 MMOL/L
POC IONIZED CALCIUM: 1.07 MMOL/L (ref 1.06–1.42)
POC SATURATED O2: 98 % (ref 95–100)
POC TCO2: 27 MMOL/L (ref 23–27)
POTASSIUM BLD-SCNC: 4 MMOL/L (ref 3.5–5.1)
POTASSIUM SERPL-SCNC: 4.1 MMOL/L
POTASSIUM SERPL-SCNC: 4.3 MMOL/L
PROTHROMBIN TIME: 10.3 SEC
RBC # BLD AUTO: 4.54 M/UL
RBC # BLD AUTO: 4.98 M/UL
SAMPLE: ABNORMAL
SODIUM BLD-SCNC: 132 MMOL/L (ref 136–145)
SODIUM SERPL-SCNC: 130 MMOL/L
SODIUM SERPL-SCNC: 131 MMOL/L
WBC # BLD AUTO: 15.69 K/UL
WBC # BLD AUTO: 7.79 K/UL

## 2018-09-10 PROCEDURE — 37000008 HC ANESTHESIA 1ST 15 MINUTES: Performed by: NEUROLOGICAL SURGERY

## 2018-09-10 PROCEDURE — 36000710: Performed by: NEUROLOGICAL SURGERY

## 2018-09-10 PROCEDURE — 20936 SP BONE AGRFT LOCAL ADD-ON: CPT | Mod: ,,, | Performed by: NEUROLOGICAL SURGERY

## 2018-09-10 PROCEDURE — 85025 COMPLETE CBC W/AUTO DIFF WBC: CPT

## 2018-09-10 PROCEDURE — 36415 COLL VENOUS BLD VENIPUNCTURE: CPT

## 2018-09-10 PROCEDURE — 27000221 HC OXYGEN, UP TO 24 HOURS

## 2018-09-10 PROCEDURE — 25000003 PHARM REV CODE 250

## 2018-09-10 PROCEDURE — 63600175 PHARM REV CODE 636 W HCPCS: Performed by: NURSE ANESTHETIST, CERTIFIED REGISTERED

## 2018-09-10 PROCEDURE — C1713 ANCHOR/SCREW BN/BN,TIS/BN: HCPCS | Performed by: NEUROLOGICAL SURGERY

## 2018-09-10 PROCEDURE — 0SG10A0 FUSION OF 2 OR MORE LUMBAR VERTEBRAL JOINTS WITH INTERBODY FUSION DEVICE, ANTERIOR APPROACH, ANTERIOR COLUMN, OPEN APPROACH: ICD-10-PCS | Performed by: NEUROLOGICAL SURGERY

## 2018-09-10 PROCEDURE — D9220A PRA ANESTHESIA: Mod: CRNA,,, | Performed by: NURSE ANESTHETIST, CERTIFIED REGISTERED

## 2018-09-10 PROCEDURE — 25000003 PHARM REV CODE 250: Performed by: NEUROLOGICAL SURGERY

## 2018-09-10 PROCEDURE — D9220A PRA ANESTHESIA: Mod: ANES,,, | Performed by: ANESTHESIOLOGY

## 2018-09-10 PROCEDURE — 27201423 OPTIME MED/SURG SUP & DEVICES STERILE SUPPLY: Performed by: NEUROLOGICAL SURGERY

## 2018-09-10 PROCEDURE — 63600175 PHARM REV CODE 636 W HCPCS: Performed by: STUDENT IN AN ORGANIZED HEALTH CARE EDUCATION/TRAINING PROGRAM

## 2018-09-10 PROCEDURE — 63600175 PHARM REV CODE 636 W HCPCS: Performed by: NEUROLOGICAL SURGERY

## 2018-09-10 PROCEDURE — 0ST40ZZ RESECTION OF LUMBOSACRAL DISC, OPEN APPROACH: ICD-10-PCS | Performed by: NEUROLOGICAL SURGERY

## 2018-09-10 PROCEDURE — 25000003 PHARM REV CODE 250: Performed by: NURSE ANESTHETIST, CERTIFIED REGISTERED

## 2018-09-10 PROCEDURE — 07DR3ZZ EXTRACTION OF ILIAC BONE MARROW, PERCUTANEOUS APPROACH: ICD-10-PCS | Performed by: NEUROLOGICAL SURGERY

## 2018-09-10 PROCEDURE — 85730 THROMBOPLASTIN TIME PARTIAL: CPT

## 2018-09-10 PROCEDURE — 27100025 HC TUBING, SET FLUID WARMER: Performed by: NURSE ANESTHETIST, CERTIFIED REGISTERED

## 2018-09-10 PROCEDURE — 86920 COMPATIBILITY TEST SPIN: CPT

## 2018-09-10 PROCEDURE — 20939 BONE MARROW ASPIR BONE GRFG: CPT | Mod: LT,,, | Performed by: NEUROLOGICAL SURGERY

## 2018-09-10 PROCEDURE — 36620 INSERTION CATHETER ARTERY: CPT | Mod: 59,,, | Performed by: ANESTHESIOLOGY

## 2018-09-10 PROCEDURE — 36000711: Performed by: NEUROLOGICAL SURGERY

## 2018-09-10 PROCEDURE — C1769 GUIDE WIRE: HCPCS | Performed by: NEUROLOGICAL SURGERY

## 2018-09-10 PROCEDURE — 25000003 PHARM REV CODE 250: Performed by: STUDENT IN AN ORGANIZED HEALTH CARE EDUCATION/TRAINING PROGRAM

## 2018-09-10 PROCEDURE — 27201037 HC PRESSURE MONITORING SET UP

## 2018-09-10 PROCEDURE — 86850 RBC ANTIBODY SCREEN: CPT

## 2018-09-10 PROCEDURE — 22853 INSJ BIOMECHANICAL DEVICE: CPT | Mod: ,,, | Performed by: NEUROLOGICAL SURGERY

## 2018-09-10 PROCEDURE — 22585 ARTHRD ANT NTRBD MIN DSC EA: CPT | Mod: ,,, | Performed by: NEUROLOGICAL SURGERY

## 2018-09-10 PROCEDURE — 94761 N-INVAS EAR/PLS OXIMETRY MLT: CPT

## 2018-09-10 PROCEDURE — 80048 BASIC METABOLIC PNL TOTAL CA: CPT

## 2018-09-10 PROCEDURE — 85610 PROTHROMBIN TIME: CPT

## 2018-09-10 PROCEDURE — 99900035 HC TECH TIME PER 15 MIN (STAT)

## 2018-09-10 PROCEDURE — 63600175 PHARM REV CODE 636 W HCPCS

## 2018-09-10 PROCEDURE — 20600001 HC STEP DOWN PRIVATE ROOM

## 2018-09-10 PROCEDURE — 0SG30AJ FUSION OF LUMBOSACRAL JOINT WITH INTERBODY FUSION DEVICE, POSTERIOR APPROACH, ANTERIOR COLUMN, OPEN APPROACH: ICD-10-PCS | Performed by: NEUROLOGICAL SURGERY

## 2018-09-10 PROCEDURE — 63600175 PHARM REV CODE 636 W HCPCS: Performed by: ANESTHESIOLOGY

## 2018-09-10 PROCEDURE — 71000039 HC RECOVERY, EACH ADD'L HOUR: Performed by: NEUROLOGICAL SURGERY

## 2018-09-10 PROCEDURE — 71000033 HC RECOVERY, INTIAL HOUR: Performed by: NEUROLOGICAL SURGERY

## 2018-09-10 PROCEDURE — 22842 INSERT SPINE FIXATION DEVICE: CPT | Mod: ,,, | Performed by: NEUROLOGICAL SURGERY

## 2018-09-10 PROCEDURE — 27800903 OPTIME MED/SURG SUP & DEVICES OTHER IMPLANTS: Performed by: NEUROLOGICAL SURGERY

## 2018-09-10 PROCEDURE — 4A11X4G MONITORING OF PERIPHERAL NERVOUS ELECTRICAL ACTIVITY, INTRAOPERATIVE, EXTERNAL APPROACH: ICD-10-PCS | Performed by: NEUROLOGICAL SURGERY

## 2018-09-10 PROCEDURE — 22558 ARTHRD ANT NTRBD MIN DSC LUM: CPT | Mod: 59,,, | Performed by: NEUROLOGICAL SURGERY

## 2018-09-10 PROCEDURE — 37000009 HC ANESTHESIA EA ADD 15 MINS: Performed by: NEUROLOGICAL SURGERY

## 2018-09-10 PROCEDURE — 22630 ARTHRD PST TQ 1NTRSPC LUM: CPT | Mod: ,,, | Performed by: NEUROLOGICAL SURGERY

## 2018-09-10 PROCEDURE — 0ST20ZZ RESECTION OF LUMBAR VERTEBRAL DISC, OPEN APPROACH: ICD-10-PCS | Performed by: NEUROLOGICAL SURGERY

## 2018-09-10 DEVICE — IMPLANTABLE DEVICE: Type: IMPLANTABLE DEVICE | Site: SPINE LUMBAR | Status: FUNCTIONAL

## 2018-09-10 DEVICE — TULIP CREO MIS MOD POLY 30MM: Type: IMPLANTABLE DEVICE | Site: SPINE LUMBAR | Status: FUNCTIONAL

## 2018-09-10 DEVICE — CAP LOCKING CREO MIS: Type: IMPLANTABLE DEVICE | Site: SPINE LUMBAR | Status: FUNCTIONAL

## 2018-09-10 DEVICE — SCREW CREO CANN MOD 6.5X45MM: Type: IMPLANTABLE DEVICE | Site: SPINE LUMBAR | Status: FUNCTIONAL

## 2018-09-10 RX ORDER — MIDAZOLAM HYDROCHLORIDE 1 MG/ML
INJECTION, SOLUTION INTRAMUSCULAR; INTRAVENOUS
Status: DISCONTINUED | OUTPATIENT
Start: 2018-09-10 | End: 2018-09-10

## 2018-09-10 RX ORDER — MEPERIDINE HYDROCHLORIDE 50 MG/ML
12.5 INJECTION INTRAMUSCULAR; INTRAVENOUS; SUBCUTANEOUS ONCE AS NEEDED
Status: DISCONTINUED | OUTPATIENT
Start: 2018-09-10 | End: 2018-09-10 | Stop reason: HOSPADM

## 2018-09-10 RX ORDER — LORAZEPAM 2 MG/ML
0.25 INJECTION INTRAMUSCULAR ONCE AS NEEDED
Status: DISCONTINUED | OUTPATIENT
Start: 2018-09-10 | End: 2018-09-10 | Stop reason: HOSPADM

## 2018-09-10 RX ORDER — HYDROCHLOROTHIAZIDE 12.5 MG/1
12.5 TABLET ORAL DAILY
Status: DISCONTINUED | OUTPATIENT
Start: 2018-09-11 | End: 2018-09-13 | Stop reason: HOSPADM

## 2018-09-10 RX ORDER — BACITRACIN ZINC 500 UNIT/G
OINTMENT (GRAM) TOPICAL
Status: DISCONTINUED | OUTPATIENT
Start: 2018-09-10 | End: 2018-09-10 | Stop reason: HOSPADM

## 2018-09-10 RX ORDER — KETAMINE HCL IN 0.9 % NACL 50 MG/5 ML
SYRINGE (ML) INTRAVENOUS
Status: DISCONTINUED | OUTPATIENT
Start: 2018-09-10 | End: 2018-09-10

## 2018-09-10 RX ORDER — HEPARIN SODIUM 1000 [USP'U]/ML
INJECTION, SOLUTION INTRAVENOUS; SUBCUTANEOUS
Status: DISCONTINUED | OUTPATIENT
Start: 2018-09-10 | End: 2018-09-10 | Stop reason: HOSPADM

## 2018-09-10 RX ORDER — SODIUM CHLORIDE 9 MG/ML
INJECTION, SOLUTION INTRAVENOUS CONTINUOUS
Status: DISCONTINUED | OUTPATIENT
Start: 2018-09-10 | End: 2018-09-10

## 2018-09-10 RX ORDER — CEFAZOLIN SODIUM 1 G/3ML
2 INJECTION, POWDER, FOR SOLUTION INTRAMUSCULAR; INTRAVENOUS
Status: COMPLETED | OUTPATIENT
Start: 2018-09-10 | End: 2018-09-10

## 2018-09-10 RX ORDER — ACETAMINOPHEN 10 MG/ML
INJECTION, SOLUTION INTRAVENOUS
Status: DISCONTINUED | OUTPATIENT
Start: 2018-09-10 | End: 2018-09-10

## 2018-09-10 RX ORDER — ROSUVASTATIN CALCIUM 5 MG/1
20 TABLET, COATED ORAL NIGHTLY
Status: DISCONTINUED | OUTPATIENT
Start: 2018-09-10 | End: 2018-09-13 | Stop reason: HOSPADM

## 2018-09-10 RX ORDER — MORPHINE SULFATE 2 MG/ML
2 INJECTION, SOLUTION INTRAMUSCULAR; INTRAVENOUS
Status: DISCONTINUED | OUTPATIENT
Start: 2018-09-10 | End: 2018-09-11

## 2018-09-10 RX ORDER — MUPIROCIN 20 MG/G
1 OINTMENT TOPICAL 2 TIMES DAILY
Status: DISCONTINUED | OUTPATIENT
Start: 2018-09-10 | End: 2018-09-13 | Stop reason: HOSPADM

## 2018-09-10 RX ORDER — METHYLPREDNISOLONE ACETATE 40 MG/ML
INJECTION, SUSPENSION INTRA-ARTICULAR; INTRALESIONAL; INTRAMUSCULAR; SOFT TISSUE
Status: DISCONTINUED | OUTPATIENT
Start: 2018-09-10 | End: 2018-09-10 | Stop reason: HOSPADM

## 2018-09-10 RX ORDER — AMOXICILLIN 250 MG
2 CAPSULE ORAL NIGHTLY PRN
Status: DISCONTINUED | OUTPATIENT
Start: 2018-09-10 | End: 2018-09-12

## 2018-09-10 RX ORDER — MAG HYDROX/ALUMINUM HYD/SIMETH 200-200-20
30 SUSPENSION, ORAL (FINAL DOSE FORM) ORAL EVERY 4 HOURS PRN
Status: DISCONTINUED | OUTPATIENT
Start: 2018-09-10 | End: 2018-09-13 | Stop reason: HOSPADM

## 2018-09-10 RX ORDER — BISACODYL 10 MG
10 SUPPOSITORY, RECTAL RECTAL DAILY
Status: DISCONTINUED | OUTPATIENT
Start: 2018-09-11 | End: 2018-09-13 | Stop reason: HOSPADM

## 2018-09-10 RX ORDER — OXYCODONE HYDROCHLORIDE 5 MG/1
TABLET ORAL
Status: COMPLETED
Start: 2018-09-10 | End: 2018-09-10

## 2018-09-10 RX ORDER — BUPIVACAINE HYDROCHLORIDE AND EPINEPHRINE 5; 5 MG/ML; UG/ML
INJECTION, SOLUTION EPIDURAL; INTRACAUDAL; PERINEURAL
Status: DISCONTINUED | OUTPATIENT
Start: 2018-09-10 | End: 2018-09-10 | Stop reason: HOSPADM

## 2018-09-10 RX ORDER — MUPIROCIN 20 MG/G
OINTMENT TOPICAL
Status: DISCONTINUED | OUTPATIENT
Start: 2018-09-10 | End: 2018-09-10 | Stop reason: HOSPADM

## 2018-09-10 RX ORDER — BACITRACIN 50000 [IU]/1
INJECTION, POWDER, FOR SOLUTION INTRAMUSCULAR
Status: DISCONTINUED | OUTPATIENT
Start: 2018-09-10 | End: 2018-09-10 | Stop reason: HOSPADM

## 2018-09-10 RX ORDER — HYDROMORPHONE HYDROCHLORIDE 1 MG/ML
INJECTION, SOLUTION INTRAMUSCULAR; INTRAVENOUS; SUBCUTANEOUS
Status: DISPENSED
Start: 2018-09-10 | End: 2018-09-11

## 2018-09-10 RX ORDER — DEXAMETHASONE SODIUM PHOSPHATE 4 MG/ML
INJECTION, SOLUTION INTRA-ARTICULAR; INTRALESIONAL; INTRAMUSCULAR; INTRAVENOUS; SOFT TISSUE
Status: DISCONTINUED | OUTPATIENT
Start: 2018-09-10 | End: 2018-09-10

## 2018-09-10 RX ORDER — VANCOMYCIN HYDROCHLORIDE 1 G/20ML
INJECTION, POWDER, LYOPHILIZED, FOR SOLUTION INTRAVENOUS
Status: DISCONTINUED | OUTPATIENT
Start: 2018-09-10 | End: 2018-09-10 | Stop reason: HOSPADM

## 2018-09-10 RX ORDER — CEFAZOLIN SODIUM 1 G/3ML
2 INJECTION, POWDER, FOR SOLUTION INTRAMUSCULAR; INTRAVENOUS
Status: DISPENSED | OUTPATIENT
Start: 2018-09-10 | End: 2018-09-11

## 2018-09-10 RX ORDER — PROPOFOL 10 MG/ML
VIAL (ML) INTRAVENOUS CONTINUOUS PRN
Status: DISCONTINUED | OUTPATIENT
Start: 2018-09-10 | End: 2018-09-10

## 2018-09-10 RX ORDER — PROPOFOL 10 MG/ML
VIAL (ML) INTRAVENOUS
Status: DISCONTINUED | OUTPATIENT
Start: 2018-09-10 | End: 2018-09-10

## 2018-09-10 RX ORDER — ATENOLOL 25 MG/1
25 TABLET ORAL 2 TIMES DAILY
Status: DISCONTINUED | OUTPATIENT
Start: 2018-09-10 | End: 2018-09-13 | Stop reason: HOSPADM

## 2018-09-10 RX ORDER — EPHEDRINE SULFATE 50 MG/ML
INJECTION, SOLUTION INTRAVENOUS
Status: DISCONTINUED | OUTPATIENT
Start: 2018-09-10 | End: 2018-09-10

## 2018-09-10 RX ORDER — SUCCINYLCHOLINE CHLORIDE 20 MG/ML
INJECTION INTRAMUSCULAR; INTRAVENOUS
Status: DISCONTINUED | OUTPATIENT
Start: 2018-09-10 | End: 2018-09-10

## 2018-09-10 RX ORDER — ROCURONIUM BROMIDE 10 MG/ML
INJECTION, SOLUTION INTRAVENOUS
Status: DISCONTINUED | OUTPATIENT
Start: 2018-09-10 | End: 2018-09-10

## 2018-09-10 RX ORDER — FENTANYL CITRATE 50 UG/ML
INJECTION, SOLUTION INTRAMUSCULAR; INTRAVENOUS
Status: DISCONTINUED | OUTPATIENT
Start: 2018-09-10 | End: 2018-09-10

## 2018-09-10 RX ORDER — ONDANSETRON 8 MG/1
8 TABLET, ORALLY DISINTEGRATING ORAL EVERY 6 HOURS PRN
Status: DISCONTINUED | OUTPATIENT
Start: 2018-09-10 | End: 2018-09-13 | Stop reason: HOSPADM

## 2018-09-10 RX ORDER — LIDOCAINE HCL/PF 100 MG/5ML
SYRINGE (ML) INTRAVENOUS
Status: DISCONTINUED | OUTPATIENT
Start: 2018-09-10 | End: 2018-09-10

## 2018-09-10 RX ORDER — MUPIROCIN 20 MG/G
1 OINTMENT TOPICAL
Status: COMPLETED | OUTPATIENT
Start: 2018-09-10 | End: 2018-09-10

## 2018-09-10 RX ORDER — HYDROMORPHONE HYDROCHLORIDE 2 MG/ML
INJECTION, SOLUTION INTRAMUSCULAR; INTRAVENOUS; SUBCUTANEOUS
Status: DISCONTINUED | OUTPATIENT
Start: 2018-09-10 | End: 2018-09-10

## 2018-09-10 RX ORDER — HEPARIN SODIUM 5000 [USP'U]/ML
5000 INJECTION, SOLUTION INTRAVENOUS; SUBCUTANEOUS EVERY 8 HOURS
Status: DISCONTINUED | OUTPATIENT
Start: 2018-09-10 | End: 2018-09-13 | Stop reason: HOSPADM

## 2018-09-10 RX ORDER — OXYCODONE HYDROCHLORIDE 5 MG/1
10 TABLET ORAL EVERY 4 HOURS PRN
Status: DISCONTINUED | OUTPATIENT
Start: 2018-09-10 | End: 2018-09-12

## 2018-09-10 RX ORDER — HYDROMORPHONE HYDROCHLORIDE 1 MG/ML
0.2 INJECTION, SOLUTION INTRAMUSCULAR; INTRAVENOUS; SUBCUTANEOUS EVERY 5 MIN PRN
Status: DISCONTINUED | OUTPATIENT
Start: 2018-09-10 | End: 2018-09-10 | Stop reason: HOSPADM

## 2018-09-10 RX ORDER — HYDROMORPHONE HYDROCHLORIDE 1 MG/ML
INJECTION, SOLUTION INTRAMUSCULAR; INTRAVENOUS; SUBCUTANEOUS
Status: COMPLETED
Start: 2018-09-10 | End: 2018-09-10

## 2018-09-10 RX ORDER — ONDANSETRON 2 MG/ML
INJECTION INTRAMUSCULAR; INTRAVENOUS
Status: DISCONTINUED | OUTPATIENT
Start: 2018-09-10 | End: 2018-09-10

## 2018-09-10 RX ORDER — OLMESARTAN MEDOXOMIL 20 MG/1
20 TABLET ORAL DAILY
Status: DISCONTINUED | OUTPATIENT
Start: 2018-09-10 | End: 2018-09-13 | Stop reason: HOSPADM

## 2018-09-10 RX ORDER — ACETAMINOPHEN 325 MG/1
650 TABLET ORAL EVERY 6 HOURS PRN
Status: DISCONTINUED | OUTPATIENT
Start: 2018-09-10 | End: 2018-09-13 | Stop reason: HOSPADM

## 2018-09-10 RX ORDER — METHOCARBAMOL 750 MG/1
750 TABLET, FILM COATED ORAL 4 TIMES DAILY
Status: DISCONTINUED | OUTPATIENT
Start: 2018-09-10 | End: 2018-09-11

## 2018-09-10 RX ORDER — LIDOCAINE HYDROCHLORIDE AND EPINEPHRINE 10; 10 MG/ML; UG/ML
INJECTION, SOLUTION INFILTRATION; PERINEURAL
Status: DISCONTINUED | OUTPATIENT
Start: 2018-09-10 | End: 2018-09-10 | Stop reason: HOSPADM

## 2018-09-10 RX ADMIN — HYDROMORPHONE HYDROCHLORIDE 0.2 MG: 1 INJECTION, SOLUTION INTRAMUSCULAR; INTRAVENOUS; SUBCUTANEOUS at 05:09

## 2018-09-10 RX ADMIN — DEXAMETHASONE SODIUM PHOSPHATE 8 MG: 4 INJECTION, SOLUTION INTRAMUSCULAR; INTRAVENOUS at 10:09

## 2018-09-10 RX ADMIN — MUPIROCIN 1 G: 20 OINTMENT TOPICAL at 09:09

## 2018-09-10 RX ADMIN — LIDOCAINE HYDROCHLORIDE 100 MG: 20 INJECTION, SOLUTION INTRAVENOUS at 09:09

## 2018-09-10 RX ADMIN — EPHEDRINE SULFATE 10 MG: 50 INJECTION, SOLUTION INTRAMUSCULAR; INTRAVENOUS; SUBCUTANEOUS at 10:09

## 2018-09-10 RX ADMIN — HYDROMORPHONE HYDROCHLORIDE 1 MG: 2 INJECTION, SOLUTION INTRAMUSCULAR; INTRAVENOUS; SUBCUTANEOUS at 05:09

## 2018-09-10 RX ADMIN — SUCCINYLCHOLINE CHLORIDE 200 MG: 20 INJECTION, SOLUTION INTRAMUSCULAR; INTRAVENOUS at 09:09

## 2018-09-10 RX ADMIN — MUPIROCIN 1 G: 20 OINTMENT TOPICAL at 06:09

## 2018-09-10 RX ADMIN — SODIUM CHLORIDE: 0.9 INJECTION, SOLUTION INTRAVENOUS at 06:09

## 2018-09-10 RX ADMIN — HYDROMORPHONE HYDROCHLORIDE 0.2 MG: 1 INJECTION, SOLUTION INTRAMUSCULAR; INTRAVENOUS; SUBCUTANEOUS at 06:09

## 2018-09-10 RX ADMIN — EPHEDRINE SULFATE 15 MG: 50 INJECTION, SOLUTION INTRAMUSCULAR; INTRAVENOUS; SUBCUTANEOUS at 11:09

## 2018-09-10 RX ADMIN — OXYCODONE HYDROCHLORIDE 10 MG: 5 TABLET ORAL at 10:09

## 2018-09-10 RX ADMIN — ROCURONIUM BROMIDE 5 MG: 10 INJECTION, SOLUTION INTRAVENOUS at 09:09

## 2018-09-10 RX ADMIN — Medication 20 MG: at 11:09

## 2018-09-10 RX ADMIN — FENTANYL CITRATE 100 MCG: 50 INJECTION, SOLUTION INTRAMUSCULAR; INTRAVENOUS at 09:09

## 2018-09-10 RX ADMIN — SODIUM CHLORIDE 0.25 MCG/KG/MIN: 9 INJECTION, SOLUTION INTRAVENOUS at 11:09

## 2018-09-10 RX ADMIN — Medication 50 MG: at 10:09

## 2018-09-10 RX ADMIN — SODIUM CHLORIDE, SODIUM GLUCONATE, SODIUM ACETATE, POTASSIUM CHLORIDE, MAGNESIUM CHLORIDE, SODIUM PHOSPHATE, DIBASIC, AND POTASSIUM PHOSPHATE: .53; .5; .37; .037; .03; .012; .00082 INJECTION, SOLUTION INTRAVENOUS at 10:09

## 2018-09-10 RX ADMIN — SUGAMMADEX 200 MG: 100 INJECTION, SOLUTION INTRAVENOUS at 10:09

## 2018-09-10 RX ADMIN — ACETAMINOPHEN 1000 MG: 10 INJECTION, SOLUTION INTRAVENOUS at 10:09

## 2018-09-10 RX ADMIN — ROCURONIUM BROMIDE 45 MG: 10 INJECTION, SOLUTION INTRAVENOUS at 10:09

## 2018-09-10 RX ADMIN — EPHEDRINE SULFATE 5 MG: 50 INJECTION, SOLUTION INTRAMUSCULAR; INTRAVENOUS; SUBCUTANEOUS at 01:09

## 2018-09-10 RX ADMIN — Medication 10 MG: at 12:09

## 2018-09-10 RX ADMIN — SODIUM CHLORIDE, SODIUM GLUCONATE, SODIUM ACETATE, POTASSIUM CHLORIDE, MAGNESIUM CHLORIDE, SODIUM PHOSPHATE, DIBASIC, AND POTASSIUM PHOSPHATE: .53; .5; .37; .037; .03; .012; .00082 INJECTION, SOLUTION INTRAVENOUS at 01:09

## 2018-09-10 RX ADMIN — HEPARIN SODIUM 5000 UNITS: 5000 INJECTION, SOLUTION INTRAVENOUS; SUBCUTANEOUS at 09:09

## 2018-09-10 RX ADMIN — MIDAZOLAM HYDROCHLORIDE 2 MG: 1 INJECTION, SOLUTION INTRAMUSCULAR; INTRAVENOUS at 09:09

## 2018-09-10 RX ADMIN — CEFAZOLIN 2 G: 1 INJECTION, POWDER, FOR SOLUTION INTRAMUSCULAR; INTRAVENOUS at 09:09

## 2018-09-10 RX ADMIN — FENTANYL CITRATE 50 MCG: 50 INJECTION, SOLUTION INTRAMUSCULAR; INTRAVENOUS at 05:09

## 2018-09-10 RX ADMIN — Medication 30 MG: at 01:09

## 2018-09-10 RX ADMIN — PROPOFOL 50 MCG/KG/MIN: 10 INJECTION, EMULSION INTRAVENOUS at 10:09

## 2018-09-10 RX ADMIN — PROPOFOL 200 MG: 10 INJECTION, EMULSION INTRAVENOUS at 09:09

## 2018-09-10 RX ADMIN — OXYCODONE HYDROCHLORIDE 10 MG: 5 TABLET ORAL at 05:09

## 2018-09-10 RX ADMIN — ATENOLOL 25 MG: 25 TABLET ORAL at 09:09

## 2018-09-10 RX ADMIN — SODIUM CHLORIDE, SODIUM GLUCONATE, SODIUM ACETATE, POTASSIUM CHLORIDE, MAGNESIUM CHLORIDE, SODIUM PHOSPHATE, DIBASIC, AND POTASSIUM PHOSPHATE: .53; .5; .37; .037; .03; .012; .00082 INJECTION, SOLUTION INTRAVENOUS at 11:09

## 2018-09-10 RX ADMIN — CEFAZOLIN 3 G: 330 INJECTION, POWDER, FOR SOLUTION INTRAMUSCULAR; INTRAVENOUS at 10:09

## 2018-09-10 RX ADMIN — SODIUM CHLORIDE: 0.9 INJECTION, SOLUTION INTRAVENOUS at 05:09

## 2018-09-10 RX ADMIN — ONDANSETRON 4 MG: 2 INJECTION INTRAMUSCULAR; INTRAVENOUS at 03:09

## 2018-09-10 RX ADMIN — REMIFENTANIL HYDROCHLORIDE 0.05 MCG/KG/MIN: 1 INJECTION, POWDER, LYOPHILIZED, FOR SOLUTION INTRAVENOUS at 10:09

## 2018-09-10 RX ADMIN — DEXAMETHASONE SODIUM PHOSPHATE 4 MG: 4 INJECTION, SOLUTION INTRAMUSCULAR; INTRAVENOUS at 12:09

## 2018-09-10 RX ADMIN — PROPOFOL 50 MG: 10 INJECTION, EMULSION INTRAVENOUS at 10:09

## 2018-09-10 RX ADMIN — FENTANYL CITRATE 100 MCG: 50 INJECTION, SOLUTION INTRAMUSCULAR; INTRAVENOUS at 10:09

## 2018-09-10 RX ADMIN — CEFAZOLIN 3 G: 330 INJECTION, POWDER, FOR SOLUTION INTRAMUSCULAR; INTRAVENOUS at 02:09

## 2018-09-10 RX ADMIN — EPHEDRINE SULFATE 10 MG: 50 INJECTION, SOLUTION INTRAMUSCULAR; INTRAVENOUS; SUBCUTANEOUS at 11:09

## 2018-09-10 RX ADMIN — METHOCARBAMOL 750 MG: 750 TABLET ORAL at 10:09

## 2018-09-10 NOTE — PLAN OF CARE
09/10/2018  7:05 AM    Came to the bedside, patient states he is surprised device needs to be re-programmed as he has had prior back surgeries and no changes were made.    Clarified with anesthesia and patient, his surgery will be L3-L5, below the level of the umbilicus, no reprogramming needed.    Yusuf Andrews

## 2018-09-10 NOTE — ANESTHESIA PROCEDURE NOTES
Arterial    Diagnosis: DDD; Spinal Stenosis  Doctor requesting consult: Juve    Patient location during procedure: done in OR  Procedure start time: 9/10/2018 10:08 AM  Timeout: 9/10/2018 10:08 AM  Procedure end time: 9/10/2018 10:10 AM  Staffing  Resident/CRNA: Linn Luna CRNA  Performed: resident/CRNA   Anesthesiologist was present at the time of the procedure.  Preanesthetic Checklist  Completed: patient identified, site marked, surgical consent, pre-op evaluation, timeout performed, IV checked, risks and benefits discussed, monitors and equipment checked and anesthesia consent givenArterial  Skin Prep: chlorhexidine gluconate and isopropyl alcohol  Local Infiltration: none  Orientation: right  Location: radial  Catheter Size: 20 G  Catheter placement by Anatomical landmarks. Heme positive aspiration all ports.Insertion Attempts: 1  Assessment  Dressing: secured with tape and tegaderm  Patient: Tolerated well

## 2018-09-10 NOTE — INTERVAL H&P NOTE
The patient has been examined and the H&P has been reviewed:    I concur with the findings and no changes have occurred since H&P was written.    Anesthesia/Surgery risks, benefits and alternative options discussed and understood by patient/family.          Active Hospital Problems    Diagnosis  POA    Lumbar stenosis with neurogenic claudication [M48.062]  Yes      Resolved Hospital Problems   No resolved problems to display.

## 2018-09-10 NOTE — TRANSFER OF CARE
"Anesthesia Transfer of Care Note    Patient: Jarrett Blanco    Procedure(s) Performed: Procedure(s) (LRB):  Left L3-4 and L4-5 XLIF and MIS TLIF L5-S1 with L3-S1 perc instrumentation (Left)  ASPIRATION, BONE MARROW (N/A)    Patient location: PACU    Anesthesia Type: general    Transport from OR: Transported from OR on 100% O2 by closed face mask with adequate spontaneous ventilation    Post pain: adequate analgesia    Post assessment: no apparent anesthetic complications    Post vital signs: stable    Level of consciousness: awake    Nausea/Vomiting: no nausea/vomiting    Complications: none    Transfer of care protocol was followed      Last vitals:   Visit Vitals  /81 (BP Location: Right arm, Patient Position: Lying)   Pulse 79   Temp 36.5 °C (97.7 °F) (Temporal)   Resp 18   Ht 6' 1" (1.854 m)   Wt (!) 145.2 kg (320 lb)   SpO2 (!) 92%   BMI 42.22 kg/m²     "

## 2018-09-10 NOTE — OP NOTE
DATE OF PROCEDURE: 9/10/2018     PREOPERATIVE DIAGNOSES:   DDD (degenerative disc disease), lumbar [M51.36]  Spinal stenosis of lumbar region with neurogenic claudication [M48.062]  Spondylolisthesis of lumbosacral region [M43.17]    POSTOPERATIVE DIAGNOSES:   DDD (degenerative disc disease), lumbar [M51.36]  Spinal stenosis of lumbar region with neurogenic claudication [M48.062]  Spondylolisthesis of lumbosacral region [M43.17]    PROCEDURES PERFORMED:   Procedure(s) (LRB):  Left L3-4 and L4-5 XLIF and MIS TLIF L5-S1 with L3-S1 perc instrumentation (Left)  ASPIRATION, BONE MARROW (N/A)    Surgeon(s) and Role:     * Larry An MD - Primary  Resident- Colt Guillory MD    ANESTHESIA: General    ESTIMATED BLOOD LOSS: 150cc    INSTRUMENTATION USED: GLOBUS     CLINICAL HISTORY AND INDICATIONS FOR SURGERY: Jarrett Blanco  is a 68 y.o.male  that presented to my clinic with symptoms of axial   mechanical lower back pain and bilateral lower extremity radiculopathy, which failed to respond to physical therapy and spinal injections. We discussed all the attendant risks, benefits, and potential complications of surgery and she wanted to proceed with surgery and consented to surgery.     OPERATIVE PROCEDURE: The patient was brought in to the Operating Room, identified and general anesthesia was induced using endotracheal intubation.   All the required IV lines were placed. We set up for intraoperative monitoring and baseline parameters were obtained. The patient was then gently logrolled in a lateral decubitus position with the left side up. True AP and lateral x-ray were obtained and the patient was secured to the operating table and all neurocutaneous pressure points were checked and padded. We marked the entrance point of the skin incision for access to L4-L5 disk space. The area was then prepped and draped using sterile technique. We infiltrated local anesthetic. We cannulated the iliac crest with Jamshidi needle to  obtain bone marrow aspirate which was the spun down to obtain stem cells for later use in our interbody fusion.     We incised the skin, subcutaneous tissue and then dissected the external oblique, internal oblique the rectus abdominis muscle along their fibers. We went through the fascia of the rectus abdominis muscle to get into the retroperitoneal fat. We mobilized the abdominal content anteriorly and then placed the first dilator with continuous   electrophysiologic monitoring over the psoas muscle. We used lateral x-ray to cannulate the L4-L5 disk space. We placed a K-wire and then assembled the XLIF retractor system over the K-wire. We then proceeded with the AP x-ray and placed the bety to prevent any migration of the retractor system. We then attached the light source. We stimulated the psoas, removed remnant psoas muscle overlying the L4-L5 disk space and there were no neural structures. We then made an annulotomy. We used the Gillespie to cut through the contralateral annulus. We then used increasing sizes of disk space brock up to size 9 to do a complete discectomy at L4-L5. We used ring curette to remove cartilaginous endplate materials and additional disc materials. We then sized the disk space and we chose a size 11 x 50 x 18 mm 4-degree lordotic cage. The lordotic cage was packed with BMA and Formagraft and was inserted at L4L5 using AP and lateral x-ray guidance. We took final AP and lateral x-ray and were satisfied with the placement.     We repeated the same steps at L3-4 to do a complete discectomy and placed a 13 X 50 X 18mm 4-degree lordotic cage. We used bipolar cautery and Gelfoam soaked in thrombin for hemostasis throughout the surgery. We placed a mixture of Depo-Medrol and Marcaine into the psoas muscle, then slowly withdrew the XLIF retraction system and cauterized any bleeding points on our way out. We then used 0 Vicryl suture to approximate the fascia of the external oblique and the deep  cutaneous tissue. We used 2-0 Vicryl suture to approximate the subcuticular layer and 4-0 Monocryl to approximate the skin edges.     Patient was repositioned prone on that [place] 4-post bed for MIS-TLIF at L5-S1 and for placement of percutaneous instrumentation. The lumbosacral areas was prepped and draped using sterile technique. We marked the lateral aspect of L3-S1 bilaterally and marked out the entry points for the pedicle screws and to perform MIS TLIF on the left side at L5-S1.     We incised the skin, subcutaneous tissue, and the thoracolumbar fascia bilaterally. We then brought in the fluoroscopic machine, and using the AP images, we cannulated the pedicles of L3, L4, L5 and S1 bilaterally using the Jamshidi needles. Using lateral x-ray, we advanced  Jamshidi needles and placed the K-wires into the bodies of L3, L4, L5 and S1. We bent the K-wires out of the way on the Left side and assembled the METRx tube over the L5-S1 disk space in order to do the TLIF. We brought in the operating microscope, and then using monopolar cautery, we removed soft tissue overlying the L5-S1, L5-S1 facet on the Left side and the L5 lamina. We then used a high-speed drill and Kerrison of different configuration to do a laminotomy at L5 to decompress  L5.nerve root, and osteotome and mallet to remove the medial facet at L5-S1.The removed facet bone was given to the scrub tech who removed all the soft tissue, and grind up the bone for later use in our interbody. We used Kerrison of different configuration to undercut the lateral facet until it was flush with the pedicles of S1. We then peeled the ligamentum flavum inferolaterally, removed it in piecemeal using Kerrison of different configuration. We unroofed the S1 foramen. We then retracted the thecal sac   medially, cauterized epidural bleeding veins, and used a 15 blade to make a annulotomy into the L5-S1 disk space.  We then used disk space brock up to size 9mm to do a  complete diskectomy at L5-S1. We used down-pushing curette and ring curette to remove cartilaginous endplate materials and additional disk materials. We then placed a mixture of BMA and autograft from facet anteriorly into the L5-S1 disk space using fluoroscopic image guidance. We then chose an 10 X 22mm  TLIF PEEK cage filled with autograft and BMA and placed it at L5-S1 under lateral and AP image guidance. We were satisfied with the final placement of the TLIF cage.         For final instrumentation, we placed the pedicle screws using 6.5 X 45 L3, L4 and L5 bilateral and 6.5 X 40 at S1 bilaterally. This was done under x-ray guidance. We then placed the adjoining rods. We used  100 mm yuridia, placed set screws and tightened them. We disassembled the GLOBUS Instrumentation System. Final AP and lateral x-rays were satisfactory.     We irrigated the area of the surgery with bacitracin saline and. We then used 0 Vicryl suture to approximate the thoracolumbar fascia for the deep cutaneous tissue, 2-0 Vicryl stitch for the subcuticular layer and 4-0 Monocryl to approximate the skin edges. We applied bacitracin ointment and Telfa and Tegaderm for final dressing of the incisions. The patient was repositioned supine on the hospital bed, weaned off general anesthesia and extubated. She was moving both lower extremities symmetrically and strongly and was transferred to the Recovery Room in stable condition.

## 2018-09-11 PROBLEM — M43.17 SPONDYLOLISTHESIS OF LUMBOSACRAL REGION: Status: RESOLVED | Noted: 2018-07-10 | Resolved: 2018-09-11

## 2018-09-11 LAB
ANION GAP SERPL CALC-SCNC: 9 MMOL/L
BASOPHILS # BLD AUTO: 0.01 K/UL
BASOPHILS NFR BLD: 0.1 %
BUN SERPL-MCNC: 14 MG/DL
CALCIUM SERPL-MCNC: 9.3 MG/DL
CHLORIDE SERPL-SCNC: 94 MMOL/L
CO2 SERPL-SCNC: 25 MMOL/L
CREAT SERPL-MCNC: 1.1 MG/DL
DIFFERENTIAL METHOD: ABNORMAL
EOSINOPHIL # BLD AUTO: 0 K/UL
EOSINOPHIL NFR BLD: 0 %
ERYTHROCYTE [DISTWIDTH] IN BLOOD BY AUTOMATED COUNT: 13.3 %
EST. GFR  (AFRICAN AMERICAN): >60 ML/MIN/1.73 M^2
EST. GFR  (NON AFRICAN AMERICAN): >60 ML/MIN/1.73 M^2
GLUCOSE SERPL-MCNC: 107 MG/DL
HCT VFR BLD AUTO: 39 %
HGB BLD-MCNC: 12.5 G/DL
IMM GRANULOCYTES # BLD AUTO: 0.11 K/UL
IMM GRANULOCYTES NFR BLD AUTO: 0.8 %
LYMPHOCYTES # BLD AUTO: 0.9 K/UL
LYMPHOCYTES NFR BLD: 6.5 %
MCH RBC QN AUTO: 28.8 PG
MCHC RBC AUTO-ENTMCNC: 32.1 G/DL
MCV RBC AUTO: 90 FL
MONOCYTES # BLD AUTO: 1.4 K/UL
MONOCYTES NFR BLD: 9.8 %
NEUTROPHILS # BLD AUTO: 11.6 K/UL
NEUTROPHILS NFR BLD: 82.8 %
NRBC BLD-RTO: 0 /100 WBC
PLATELET # BLD AUTO: 252 K/UL
PMV BLD AUTO: 8.6 FL
POTASSIUM SERPL-SCNC: 4.6 MMOL/L
RBC # BLD AUTO: 4.34 M/UL
SODIUM SERPL-SCNC: 128 MMOL/L
WBC # BLD AUTO: 13.97 K/UL

## 2018-09-11 PROCEDURE — 80048 BASIC METABOLIC PNL TOTAL CA: CPT

## 2018-09-11 PROCEDURE — 97165 OT EVAL LOW COMPLEX 30 MIN: CPT

## 2018-09-11 PROCEDURE — 97161 PT EVAL LOW COMPLEX 20 MIN: CPT

## 2018-09-11 PROCEDURE — 25000003 PHARM REV CODE 250: Performed by: STUDENT IN AN ORGANIZED HEALTH CARE EDUCATION/TRAINING PROGRAM

## 2018-09-11 PROCEDURE — G8988 SELF CARE GOAL STATUS: HCPCS | Mod: CJ

## 2018-09-11 PROCEDURE — 36415 COLL VENOUS BLD VENIPUNCTURE: CPT

## 2018-09-11 PROCEDURE — 85025 COMPLETE CBC W/AUTO DIFF WBC: CPT

## 2018-09-11 PROCEDURE — 99024 POSTOP FOLLOW-UP VISIT: CPT | Mod: ,,, | Performed by: PHYSICIAN ASSISTANT

## 2018-09-11 PROCEDURE — 63600175 PHARM REV CODE 636 W HCPCS: Performed by: STUDENT IN AN ORGANIZED HEALTH CARE EDUCATION/TRAINING PROGRAM

## 2018-09-11 PROCEDURE — 25000003 PHARM REV CODE 250: Performed by: NEUROLOGICAL SURGERY

## 2018-09-11 PROCEDURE — G8987 SELF CARE CURRENT STATUS: HCPCS | Mod: CK

## 2018-09-11 PROCEDURE — 20600001 HC STEP DOWN PRIVATE ROOM

## 2018-09-11 PROCEDURE — G8989 SELF CARE D/C STATUS: HCPCS | Mod: CK

## 2018-09-11 RX ORDER — MORPHINE SULFATE 2 MG/ML
2 INJECTION, SOLUTION INTRAMUSCULAR; INTRAVENOUS EVERY 4 HOURS PRN
Status: DISCONTINUED | OUTPATIENT
Start: 2018-09-11 | End: 2018-09-13 | Stop reason: HOSPADM

## 2018-09-11 RX ORDER — METHOCARBAMOL 750 MG/1
750 TABLET, FILM COATED ORAL 4 TIMES DAILY
Status: DISCONTINUED | OUTPATIENT
Start: 2018-09-11 | End: 2018-09-13 | Stop reason: HOSPADM

## 2018-09-11 RX ORDER — BISACODYL 5 MG
10 TABLET, DELAYED RELEASE (ENTERIC COATED) ORAL ONCE
Status: COMPLETED | OUTPATIENT
Start: 2018-09-11 | End: 2018-09-11

## 2018-09-11 RX ADMIN — BISACODYL 10 MG: 5 TABLET, COATED ORAL at 10:09

## 2018-09-11 RX ADMIN — METHOCARBAMOL 750 MG: 750 TABLET ORAL at 04:09

## 2018-09-11 RX ADMIN — HEPARIN SODIUM 5000 UNITS: 5000 INJECTION, SOLUTION INTRAVENOUS; SUBCUTANEOUS at 02:09

## 2018-09-11 RX ADMIN — HYDROCHLOROTHIAZIDE 12.5 MG: 12.5 TABLET ORAL at 08:09

## 2018-09-11 RX ADMIN — ATENOLOL 25 MG: 25 TABLET ORAL at 09:09

## 2018-09-11 RX ADMIN — OXYCODONE HYDROCHLORIDE 10 MG: 5 TABLET ORAL at 03:09

## 2018-09-11 RX ADMIN — OLMESARTAN MEDOXOMIL 20 MG: 20 TABLET, FILM COATED ORAL at 08:09

## 2018-09-11 RX ADMIN — SODIUM CHLORIDE TAB 1 GM 1 G: 1 TAB at 10:09

## 2018-09-11 RX ADMIN — STANDARDIZED SENNA CONCENTRATE AND DOCUSATE SODIUM 2 TABLET: 8.6; 5 TABLET, FILM COATED ORAL at 02:09

## 2018-09-11 RX ADMIN — HEPARIN SODIUM 5000 UNITS: 5000 INJECTION, SOLUTION INTRAVENOUS; SUBCUTANEOUS at 06:09

## 2018-09-11 RX ADMIN — OXYCODONE HYDROCHLORIDE 10 MG: 5 TABLET ORAL at 09:09

## 2018-09-11 RX ADMIN — MUPIROCIN 1 G: 20 OINTMENT TOPICAL at 09:09

## 2018-09-11 RX ADMIN — METHOCARBAMOL 750 MG: 750 TABLET ORAL at 12:09

## 2018-09-11 RX ADMIN — ONDANSETRON 8 MG: 8 TABLET, ORALLY DISINTEGRATING ORAL at 03:09

## 2018-09-11 RX ADMIN — ATENOLOL 25 MG: 25 TABLET ORAL at 08:09

## 2018-09-11 RX ADMIN — HEPARIN SODIUM 5000 UNITS: 5000 INJECTION, SOLUTION INTRAVENOUS; SUBCUTANEOUS at 09:09

## 2018-09-11 RX ADMIN — CEFAZOLIN 2 G: 1 INJECTION, POWDER, FOR SOLUTION INTRAMUSCULAR; INTRAVENOUS at 03:09

## 2018-09-11 RX ADMIN — MUPIROCIN 1 G: 20 OINTMENT TOPICAL at 08:09

## 2018-09-11 NOTE — SUBJECTIVE & OBJECTIVE
"Interval History: NAEON. Patient has some post op incisional pain but previous RLE pain now resolved. He is tolerating diet and voiding appropriately. Wife at bedside. He ambulated with therapy today and has some balance difficulty.       Medications:  Continuous Infusions:  Scheduled Meds:   atenolol  25 mg Oral BID    bisacodyl  10 mg Rectal Daily    heparin (porcine)  5,000 Units Subcutaneous Q8H    hydroCHLOROthiazide  12.5 mg Oral Daily    methocarbamol  750 mg Oral QID    mupirocin  1 g Nasal BID    olmesartan  20 mg Oral Daily    rosuvastatin  20 mg Oral QHS     PRN Meds:acetaminophen, aluminum-magnesium hydroxide-simethicone, morphine, ondansetron, oxyCODONE, promethazine (PHENERGAN) IVPB, senna-docusate 8.6-50 mg     Review of Systems  Objective:     Weight: (!) 145.2 kg (320 lb)  Body mass index is 42.22 kg/m².  Vital Signs (Most Recent):  Temp: 98.6 °F (37 °C) (09/11/18 1726)  Pulse: 78 (09/11/18 1726)  Resp: 18 (09/11/18 1726)  BP: (!) 146/73 (09/11/18 1726)  SpO2: 97 % (09/11/18 1726) Vital Signs (24h Range):  Temp:  [97.5 °F (36.4 °C)-98.6 °F (37 °C)] 98.6 °F (37 °C)  Pulse:  [76-91] 78  Resp:  [12-28] 18  SpO2:  [93 %-99 %] 97 %  BP: (118-178)/(61-86) 146/73     Date 09/11/18 0700 - 09/12/18 0659   Shift 2604-0655 3163-3911 3461-2576 24 Hour Total   INTAKE   P.O.  1200  1200   Shift Total(mL/kg)  1200(8.3)  1200(8.3)   OUTPUT   Urine(mL/kg/hr)  700  700   Shift Total(mL/kg)  700(4.8)  700(4.8)   Weight (kg) 145.1 145.1 145.1 145.1                        Urethral Catheter 09/10/18 0953 Non-latex;Straight-tip 16 Fr. (Active)   Site Assessment Clean;Intact 9/10/2018 10:53 PM   Collection Container Urimeter 9/10/2018 10:53 PM   Securement Method secured to top of thigh w/ adhesive device 9/10/2018 10:53 PM   Catheter Care Performed yes 9/10/2018 10:53 PM   Reason for Continuing Urinary Catheterization Post operative 9/10/2018 10:53 PM   CAUTI Prevention Bundle StatLock in place w 1" slack;Intact " seal between catheter & drainage tubing;Drainage bag off the floor;No dependent loops or kinks;Green sheeting clip in use;Drainage bag not overfilled (<2/3 full);Drainage bag below bladder 9/10/2018  7:30 PM   Output (mL) 1300 mL 9/11/2018  4:00 AM       Neurosurgery Physical Exam  General: well developed, well nourished. Obese. no acute distress.  Neurologic: Awake, alert and oriented x3. Thought content appropriate.  Head: normocephalic, atraumatic   GCS: Motor: 6/Verbal: 5/Eyes: 4 GCS Total: 15  Cranial nerves:face symmetric and sensation intact bilaterally, tongue midline, pupils equal, round, reactive to light with accomodation, extraocular muscles intact. CN II-XII grossly intact. Shoulder shrug strength equal. Palate rises symmetrically.  Uvula midline. Hearing equal with finger rub. Gag and taste not tested.   Language: no aphasia  Speech: no dysarthria   Sensory: response to light touch throughout  Motor Strength: Moves all extremities spontaneously with good tone. Full strength upper and lower extremities. No abnormal movements seen.         Strength  Deltoids Triceps Biceps Wrist Extension Wrist Flexion Hand    Upper: R 5/5 5/5 5/5 5/5 5/5 5/5    L 5/5 5/5 5/5 5/5 5/5 5/5     Iliopsoas Quadriceps Knee  Flexion Tibialis  anterior Gastro- cnemius EHL   Lower: R 5/5 5/5 5/5 5/5 5/5 5/5    L 5/5 5/5 5/5 5/5 5/5 5/5   Interossi muscle strength- intact    Clonus: absent  Babinski: absent   No focal numbness or weakness  ENT: normal hearing with finger rub  Heart: RRR, no cyanosis, pallor, or edema.   Lungs:  normal respiratory effort  Abdomen: soft, non-tender and symmetric  Extremities: warm with no cyanosis, edema, or clubbing  Pulses: palpable distal pulses  Skin: warm, dry and intact. No visible rashes or lesions.     Lumbar midline and left lateral incision:  wound is c/d/i, no signs of infection, no erythema, warmth, edema,   no drainage.  wound edges are well approximated. Some superficial ecchymosis  and ttp present.         Significant Labs:  Recent Labs   Lab  09/10/18   0535  09/10/18   1727   GLU  113*  165*   NA  131*  130*   K  4.1  4.3   CL  97  98   CO2  24  20*   BUN  12  11   CREATININE  0.9  1.0   CALCIUM  9.5  8.3*     Recent Labs   Lab  09/10/18   0535  09/10/18   1331  09/10/18   1727   WBC  7.79   --   15.69*   HGB  14.1   --   13.1*   HCT  43.9  40  40.6   PLT  263   --   244     Recent Labs   Lab  09/10/18   0535   INR  1.0   APTT  25.4     Microbiology Results (last 7 days)     ** No results found for the last 168 hours. **            Significant Diagnostics:  Post-op films personally reviewed and showed good hardware position at L4-S1

## 2018-09-11 NOTE — PLAN OF CARE
Cm met with patient to obtain discharge planning assessment.  Patient is POD 1 for XLIF.  Planned discharge is home with family - Plan (A) or home with family and home health - Plan (B).    PCP:  Luis Alfredo Sands Jr, MD     Payor:  Payor: [employer] MEDICARE / Plan: [employer] MEDICARE HMO / Product Type: Capitation /      Pharmacy:    [id number] - [place] - [address]  [address] [address]  Phone: [phone] Fax: [phone]    [facility] - [place], [place] - [address] [facility]  [address] [facility] [address]  Phone: [phone] Fax: [phone]    [facility] - [place], [place] - [address] [place] [address]  Phone: [phone] Fax: [phone]    [facility] - [place], [place] - [address] [facility]  [address] [facility] [address]  Phone: [phone] Fax: [phone]       09/11/18 1412   Discharge Assessment   Assessment Type Discharge Planning Assessment   Confirmed/corrected address and phone number on facesheet? Yes   Assessment information obtained from? Patient   Expected Length of Stay (days) 2   Communicated expected length of stay with patient/caregiver yes   Prior to hospitilization cognitive status: Alert/Oriented   Prior to hospitalization functional status: Independent   Current cognitive status: Alert/Oriented   Current Functional Status: Independent   Lives With spouse   Able to Return to Prior Arrangements yes   Is patient able to care for self after discharge? Yes   Who are your caregiver(s) and their phone number(s)? Maria G - spouse [phone]   Patient's perception of discharge disposition home or selfcare   Readmission Within The Last 30 Days no previous admission in last 30 days   Patient currently being followed by outpatient case management? No   Patient currently receives any other  outside agency services? No   Equipment Currently Used at Home none   Do you have any problems affording any of your prescribed medications? No   Is the patient taking medications as prescribed? yes   Does the patient have transportation home? Yes   Transportation Available family or friend will provide   Does the patient receive services at the Coumadin Clinic? No   Discharge Plan A Home with family   Discharge Plan B Home with family;Home Health   Patient/Family In Agreement With Plan yes

## 2018-09-11 NOTE — PROGRESS NOTES
Pt family at bedside, Explain to relatives that we have to wait for LSO brace to be supplied, and for the nurse floor( night nurse shift) to be ready prior to be transferred to the floor, PT refused pain medicine, He said he is not in pain and he just want to go to the floor, tolerating sips of water well, Vital signs remain within normal ranges

## 2018-09-11 NOTE — NURSING
Spoke with on call neuro surgery team about patient shivering. labs order. States he will review labs and visit pt later unless condition worsens.

## 2018-09-11 NOTE — PLAN OF CARE
Problem: Physical Therapy Goal  Goal: Physical Therapy Goal  Goals to be met by: 18    Patient will increase functional independence with mobility by performin. Supine to sit with Set-up Upton  2. Sit to supine with Set-up Upton  3. Sit to stand transfer with Stand-by Assistance  4. Bed to chair transfer with Stand-by Assistance using Rolling Walker  5. Gait  x 140 feet with Supervision using Rolling Walker.     Outcome: Ongoing (interventions implemented as appropriate)  Evaluation complete.  Goals established to improve functional mobility.    DC rec's for PT services    Luis Alfredo Claudio III, MARÍAT, PT  2018

## 2018-09-11 NOTE — PROGRESS NOTES
[facility]  Neurosurgery  Progress Note    Subjective:     History of Present Illness: Jarrett Blanco is 68 y.o. male with history of HTN, HLD, has pacemaker on asa 81mg, and previous L4-S1 laminotomy who has worsening low back pain across the whole lower back moving into the L5 with associated paresthesias into the lateral right leg. He denies any new onset of weakness. The patient describes the pain as dull. The patient rates the pain 4 on the pain scale. The patient has now been suffering with this pain for 45 year(s), and it originally started with a sports injury, and increased gradually. The pain is worse  for no specific reason and better with lying down, sitting and elevating feet. The patient currently denies any changes in bowel or bladder control. He presents for elective L4-S1 fusion.            Post-Op Info:  Procedure(s) (LRB):  Left L3-4 and L4-5 XLIF and MIS TLIF L5-S1 with L3-S1 perc instrumentation (Left)  ASPIRATION, BONE MARROW (N/A)   1 Day Post-Op     Interval History: NAEON. Patient has some post op incisional pain but previous RLE pain now resolved. He is tolerating diet and voiding appropriately. Wife at bedside. He ambulated with therapy today and has some balance difficulty.       Medications:  Continuous Infusions:  Scheduled Meds:   atenolol  25 mg Oral BID    bisacodyl  10 mg Rectal Daily    heparin (porcine)  5,000 Units Subcutaneous Q8H    hydroCHLOROthiazide  12.5 mg Oral Daily    methocarbamol  750 mg Oral QID    mupirocin  1 g Nasal BID    olmesartan  20 mg Oral Daily    rosuvastatin  20 mg Oral QHS     PRN Meds:acetaminophen, aluminum-magnesium hydroxide-simethicone, morphine, ondansetron, oxyCODONE, promethazine (PHENERGAN) IVPB, senna-docusate 8.6-50 mg     Review of Systems  Objective:     Weight: (!) 145.2 kg (320 lb)  Body mass index is 42.22 kg/m².  Vital Signs (Most Recent):  Temp: 98.6 °F (37 °C) (09/11/18 1726)  Pulse: 78 (09/11/18 1726)  Resp:  "18 (09/11/18 1726)  BP: (!) 146/73 (09/11/18 1726)  SpO2: 97 % (09/11/18 1726) Vital Signs (24h Range):  Temp:  [97.5 °F (36.4 °C)-98.6 °F (37 °C)] 98.6 °F (37 °C)  Pulse:  [76-91] 78  Resp:  [12-28] 18  SpO2:  [93 %-99 %] 97 %  BP: (118-178)/(61-86) 146/73     Date 09/11/18 0700 - 09/12/18 0659   Shift 2771-5186 6260-0083 2420-8704 24 Hour Total   INTAKE   P.O.  1200  1200   Shift Total(mL/kg)  1200(8.3)  1200(8.3)   OUTPUT   Urine(mL/kg/hr)  700  700   Shift Total(mL/kg)  700(4.8)  700(4.8)   Weight (kg) 145.1 145.1 145.1 145.1                        Urethral Catheter 09/10/18 0953 Non-latex;Straight-tip 16 Fr. (Active)   Site Assessment Clean;Intact 9/10/2018 10:53 PM   Collection Container Urimeter 9/10/2018 10:53 PM   Securement Method secured to top of thigh w/ adhesive device 9/10/2018 10:53 PM   Catheter Care Performed yes 9/10/2018 10:53 PM   Reason for Continuing Urinary Catheterization Post operative 9/10/2018 10:53 PM   CAUTI Prevention Bundle StatLock in place w 1" slack;Intact seal between catheter & drainage tubing;Drainage bag off the floor;No dependent loops or kinks;Green sheeting clip in use;Drainage bag not overfilled (<2/3 full);Drainage bag below bladder 9/10/2018  7:30 PM   Output (mL) 1300 mL 9/11/2018  4:00 AM       Neurosurgery Physical Exam  General: well developed, well nourished. Obese. no acute distress.  Neurologic: Awake, alert and oriented x3. Thought content appropriate.  Head: normocephalic, atraumatic   GCS: Motor: 6/Verbal: 5/Eyes: 4 GCS Total: 15  Cranial nerves:face symmetric and sensation intact bilaterally, tongue midline, pupils equal, round, reactive to light with accomodation, extraocular muscles intact. CN II-XII grossly intact. Shoulder shrug strength equal. Palate rises symmetrically.  Uvula midline. Hearing equal with finger rub. Gag and taste not tested.   Language: no aphasia  Speech: no dysarthria   Sensory: response to light touch throughout  Motor Strength: Moves " all extremities spontaneously with good tone. Full strength upper and lower extremities. No abnormal movements seen.         Strength  Deltoids Triceps Biceps Wrist Extension Wrist Flexion Hand    Upper: R 5/5 5/5 5/5 5/5 5/5 5/5    L 5/5 5/5 5/5 5/5 5/5 5/5     Iliopsoas Quadriceps Knee  Flexion Tibialis  anterior Gastro- cnemius EHL   Lower: R 5/5 5/5 5/5 5/5 5/5 5/5    L 5/5 5/5 5/5 5/5 5/5 5/5   Interossi muscle strength- intact    Clonus: absent  Babinski: absent   No focal numbness or weakness  ENT: normal hearing with finger rub  Heart: RRR, no cyanosis, pallor, or edema.   Lungs:  normal respiratory effort  Abdomen: soft, non-tender and symmetric  Extremities: warm with no cyanosis, edema, or clubbing  Pulses: palpable distal pulses  Skin: warm, dry and intact. No visible rashes or lesions.     Lumbar midline and left lateral incision:  wound is c/d/i, no signs of infection, no erythema, warmth, edema,   no drainage.  wound edges are well approximated. Some superficial ecchymosis and ttp present.         Significant Labs:  Recent Labs   Lab  09/10/18   0535  09/10/18   1727   GLU  113*  165*   NA  131*  130*   K  4.1  4.3   CL  97  98   CO2  24  20*   BUN  12  11   CREATININE  0.9  1.0   CALCIUM  9.5  8.3*     Recent Labs   Lab  09/10/18   0535  09/10/18   1331  09/10/18   1727   WBC  7.79   --   15.69*   HGB  14.1   --   13.1*   HCT  43.9  40  40.6   PLT  263   --   244     Recent Labs   Lab  09/10/18   0535   INR  1.0   APTT  25.4     Microbiology Results (last 7 days)     ** No results found for the last 168 hours. **            Significant Diagnostics:  Post-op films personally reviewed and showed good hardware position at L4-S1      Assessment/Plan:     Lumbar stenosis with neurogenic claudication    68 y.o. male with history of HTN, HLD, has pacemaker on asa 81mg, and previous L4-S1 laminotomy who has worsening low back pain  and RLE pain. He is s/p Posterolateral L4-S1 fusion with Dr. An  POD#1    -Patient is neurologically stable.  -Post op imaging stable.   -Pain controlled on current regimen.  -Bacitracin to incision BID starting tomorrow.   -LSO when OOB or with activity  -PT/OT/OOB daily  -Patient must be OOB for atleast 6 hours daily (may be in intervals: 2 hours in chair with each meal)  -IS to bed side. Patient to use atleast 10x every hour.  -Continue bowel regimen daily.  -Continue SQH, Teds and SCDs for DVTP.  -Restarted all home meds for  HTN and HLD. Resume home ASA 81mg tomorrow.   -therapy recommending HH with bedside commode and rolling walker  -Voiding appropriately after holm removal today.   -Will obtain labs. Post op labs from after surgery yesterday afternoon with 15k WBC leukocytosis which is expected post op. Patient afebrile. Will continue to monitor.     Dispo: DC home with HH tomorrow. Patient has 2 wk wound check with NSR nurse and 6 week follow up with Dr. An      Discussed with SNEHAL FreireC  Neurosurgery  [facility]

## 2018-09-11 NOTE — PT/OT/SLP EVAL
"Occupational Therapy   Evaluation    Name: Jarrett Blanco  MRN: [id number]  Admitting Diagnosis: Left L3-4 and L4-5 XLIF and MIS TLIF L5-S1 with L3-S1 perc instrumentation (Left)  ASPIRATION, BONE MARROW (N/A) 1 Day Post-Op    Recommendations:     Discharge Recommendations: home with home health  Discharge Equipment Recommendations:  bedside commode, walker, rolling  Barriers to discharge:  None    History:     Occupational Profile:  Living Environment: Pt lives with his wife in 1-story house with 0 STEPHANE.  Previous level of function: Mod I for ADLs with use of AD for LB dressing; uses cane as needed for ambulation  Roles and Routines: Active, coached baseball and football for years  Equipment Used at Home:  cane, straight  Assistance upon Discharge: Wife is able to assist    Past Medical History:   Diagnosis Date    Cancer     Kidney - right nephrectomy    Coronary artery disease     Hypertension     Pacemaker        Past Surgical History:   Procedure Laterality Date    CARDIAC PACEMAKER PLACEMENT      LAMINECTOMY  4/22/15    L4-S1 laminoforaminotomy    LAMINOTOMY- left L4/5 and L5/S1 laminoforaminotomy Left 4/22/2015    Performed by Larry An [facility] at [facility] OR    NEPHRECTOMY Right     SPINE SURGERY  1975    lumbar       Subjective     Chief Complaint: None stated; reports "I feel wobbly" when standing/ambulating  Patient/Family Comments/goals: Return to PLOF    Pain/Comfort:  · Pain Rating 1: 4/10  · Location - Orientation 1: lower  · Location 1: back  · Pain Addressed 1: Reposition, Distraction    Patients cultural, spiritual, [employer] conflicts given the current situation: None    Objective:     Communicated with: RN prior to session.  Patient found with peripheral IV, holm catheter upon OT entry to room.    General Precautions: Standard, fall   Orthopedic Precautions:spinal precautions   Braces: LSO     Occupational Performance:    Bed Mobility:    · Patient completed Rolling/Turning to Left with " "contact guard assistance  · Patient completed Supine to Sit with minimum assistance with HOB elevated  · Patient completed Sit to Supine with minimum assistance    Functional Mobility/Transfers:  · Patient completed Sit <> Stand Transfer with contact guard assistance with no assistive device from EOB  · Functional Mobility: ~12 ft to stretcher with CGA using RW; limited due to being taken off the floor for x-ray    Activities of Daily Living:  · Upper Body Dressing: Max A to don TLSO, Min A to don gown    · Lower Body Dressing: Total A to don socks EOB; uses AD at home      Cognitive/Visual Perceptual:  Cognitive/Psychosocial Skills:     -       Oriented to: Person, Place, Time and Situation   -       Follows Commands/attention:Follows multistep  commands  -       Communication: clear/fluent  -       Safety awareness/insight to disability: intact   Visual/Perceptual:      -Intact      Physical Exam:  Supervision sitting balance, CGA standing  Demo WFL B UE ROM, strength, intact fine motor coordination and sensation    AMPAC 6 Click ADL:  AMPAC Total Score: 18    Treatment & Education:  OT/PT eval; educated on OT role and POC as well as spinal precautions and LSO management  Education:    Patient left on stretcher  with all lines intact and RN present    Assessment:     Jarrett Blanco is a 68 y.o. male with a medical diagnosis of Left L3-4 and L4-5 XLIF and MIS TLIF L5-S1 with L3-S1 perc instrumentation.  He presents with the following performance deficits affecting function: weakness, impaired endurance, impaired self care skills, gait instability, pain. Pt would continue to benefit from OT to increase functional independence and return to PLOF. Recommend HH upon D/C.     Rehab Prognosis: Good; patient would benefit from acute skilled OT services to address these deficits and reach maximum level of function.       Clinical Decision Makin.  OT Low:  "Pt evaluation falls under low complexity for evaluation " "coding due to performance deficits noted in 1-3 areas as stated above and 0 co-morbities affecting current functional status. Data obtained from problem focused assessments. No modifications or assistance was required for completion of evaluation. Only brief occupational profile and history review completed."     Plan:     Patient to be seen 4 x/week to address the above listed problems via self-care/home management, therapeutic activities, therapeutic exercises  · Plan of Care Expires: 10/11/18  · Plan of Care Reviewed with: patient    This Plan of care has been discussed with the patient who was involved in its development and understands and is in agreement with the identified goals and treatment plan    GOALS:   Multidisciplinary Problems     Occupational Therapy Goals        Problem: Occupational Therapy Goal    Goal Priority Disciplines Outcome Interventions   Occupational Therapy Goal     OT, PT/OT Ongoing (interventions implemented as appropriate)    Description:  Goals to be met by: 7 days (9/18/18)     Patient will increase functional independence with ADLs by performing:    UE Dressing with Supervision including LSO.  LE Dressing with Minimal Assistance using AD as needed.  Grooming while standing at sink with Stand-by Assistance.  Toileting from toilet with Stand-by Assistance for hygiene and clothing management.   Supine to sit with Stand-by Assistance.  Toilet transfer to toilet with Stand-by Assistance.  Pt will complete functional mobility household distance with supervision using AD as needed.                      Time Tracking:     OT Date of Treatment: 09/11/18  OT Start Time: 0844  OT Stop Time: 0858  OT Total Time (min): 14 min    Billable Minutes:Evaluation 14 minutes    VINOD Mena  9/11/2018    "

## 2018-09-11 NOTE — PROGRESS NOTES
Handed over by CODIE kaur that pacemaker need to be turn on. EP fellow on call page several times, RE: ? Pacemaker needsto be turn on,Did not bleep back,  Pt verbalized his pacemaker wasn't switch off, DR Yusuf Mitchell notes verify that no re programming needed, vital signs remain stable, PT comfortable this time, no n/v

## 2018-09-11 NOTE — ASSESSMENT & PLAN NOTE
68 y.o. male with history of HTN, HLD, has pacemaker on asa 81mg, and previous L4-S1 laminotomy who has worsening low back pain and RLE pain. He is s/p Posterolateral L4-S1 fusion with Dr. An, POD#1    -Patient is neurologically stable.  -Post op imaging stable.   -Pain controlled on current regimen.  -Bacitracin to incision BID starting tomorrow.   -LSO when OOB or with activity  -PT/OT/OOB daily  -Patient must be OOB for atleast 6 hours daily (may be in intervals: 2 hours in chair with each meal)  -IS to bed side. Patient to use atleast 10x every hour.  -Continue bowel regimen daily.  -Continue SQH, Teds and SCDs for DVTP.  -Restarted all home meds for  HTN and HLD. Resume home ASA 81mg tomorrow.   -therapy recommending HH with bedside commode and rolling walker  -Voiding appropriately after holm removal today.   -Will obtain labs. Post op labs from after surgery yesterday afternoon with 15k WBC leukocytosis which is expected post op. Patient afebrile. Will continue to monitor.     Dispo: DC home with HH tomorrow. Patient has 2 wk wound check with NSR nurse and 6 week follow up with Dr. An      Discussed with Dr. An

## 2018-09-11 NOTE — NURSING TRANSFER
Nursing Transfer Note      9/10/2018     Transfer To: 1045    Transfer via stretcher    Transfer with 2l o2. lazaro to O2    Transported by pct,    Medicines sent: no    Chart send with patient: Yes    Notified: significant other

## 2018-09-11 NOTE — PLAN OF CARE
Problem: Occupational Therapy Goal  Goal: Occupational Therapy Goal  Goals to be met by: 7 days (9/18/18)     Patient will increase functional independence with ADLs by performing:    UE Dressing with Supervision including LSO.  LE Dressing with Minimal Assistance using AD as needed.  Grooming while standing at sink with Stand-by Assistance.  Toileting from toilet with Stand-by Assistance for hygiene and clothing management.   Supine to sit with Stand-by Assistance.  Toilet transfer to toilet with Stand-by Assistance.  Pt will complete functional mobility household distance with supervision using AD as needed.    Outcome: Ongoing (interventions implemented as appropriate)  Eval and POC set 9/11/18

## 2018-09-11 NOTE — PROGRESS NOTES
LSO brace supplied to Pt by DME staff,    DME staff wasn't able to fit it in as Pt complaining of discomfort if brace fitted, will put the brace on the floor.    RN informed that patient  not allowed move or get out of bed without LSO brace.

## 2018-09-11 NOTE — HPI
Jarrett Blanco is 68 y.o. male with history of previous right nephrectomy d/t cancer, hyponatremia, HTN, HLD, has pacemaker on asa 81mg, and previous L4-S1 laminotomy who has worsening low back pain across the whole lower back moving into the L5 with associated paresthesias into the lateral right leg. He denies any new onset of weakness. The patient describes the pain as dull. The patient rates the pain 4 on the pain scale. The patient has now been suffering with this pain for 45 year(s), and it originally started with a sports injury, and increased gradually. The pain is worse  for no specific reason and better with lying down, sitting and elevating feet. The patient currently denies any changes in bowel or bladder control. He presents for elective L4-S1 fusion.

## 2018-09-11 NOTE — PT/OT/SLP EVAL
Physical Therapy Evaluation    Patient Name:  Jarrett Blanco   MRN:  [id number]    Recommendations:     Discharge Recommendations:  home with home health   Discharge Equipment Recommendations: bedside commode, walker, rolling   Barriers to discharge: None    Assessment:     Jarrett Blanco is a 68 y.o. male admitted with a medical diagnosis of lumbar stenosis with neurogenic claudication s/p Left L3-4 and L4-5 XLIF and MIS TLIF L5-S1 with L3-S1 perc instrumentation (Left) ASPIRATION, BONE MARROW (N/A) 1 Day Post-Op.  He presents with the following impairments/functional limitations:  impaired functional mobilty, gait instability, impaired self care skills, impaired balance, decreased lower extremity function, impaired coordination, impaired skin, orthopedic precautions limiting overall functional mobiltiy. Verbalized understanding of spinal precautions; however, to benefit from reinforcement. Safest to ambulate using rolling walker with 1 person assist at this time. To benefit from continued skilled PT intervention to address deficits. DC rec's for HH therapy services to improve safety and independence with mobility.    Rehab Prognosis:  Good; patient would benefit from acute skilled PT services to address these deficits and reach maximum level of function.      Recent Surgery: Procedure(s) (LRB):  Left L3-4 and L4-5 XLIF and MIS TLIF L5-S1 with L3-S1 perc instrumentation (Left)  ASPIRATION, BONE MARROW (N/A) 1 Day Post-Op    Plan:     During this hospitalization, patient to be seen 4 x/week to address the above listed problems via gait training, therapeutic activities, therapeutic exercises  · Plan of Care Expires:  10/11/18   Plan of Care Reviewed with: patient    Subjective     Communicated with RN prior to session.  Patient found supine upon PT entry to room, agreeable to evaluation.  Mild complaint of dizziness with sit-stand transition and short-distance ambulation trial.    Chief Complaint: low back  pain  Pain/Comfort:  · Pain Rating 1: 4/10(lower back pain)  · Pain Addressed 1: Reposition, Distraction    Patients cultural, spiritual, [employer] conflicts given the current situation: none stated    Living Environment:  Patient lives with wife in 1-story home no STEPHANE.   Prior to admission, patients level of function was Indep for ambulation without equipment; however, at times patient used SPC for gait with onset of low back discomfort.  Patient has the following equipment: (occasional use of SPC).  DME owned (not currently used): none.  Upon discharge, patient will have assistance from wife as needed.    Objective:     Patient found with: peripheral IV, holm catheter     General Precautions: Standard, fall   Orthopedic Precautions:spinal precautions   Braces: LSO     Exams:  · Cognitive Exam:  Patient is oriented to Person, Place, Time and Situation  · Gross Motor Coordination:  limited knee flexion b/l with short-distance ambulation  · Postural Exam:  Patient presented with the following abnormalities: -       Forward head  · Sensation: -       Intact  light/touch BLE; denies numbness/tingling  · Skin Integrity/Edema:  -       Skin integrity: b/l lumbar incision dressed by RN  · RLE ROM: WFL  · RLE Strength: WFL  · LLE ROM: WFL  · LLE Strength: WFL    Functional Mobility:  · Bed Mobility:  Rolling Left:  contact guard assistance  · Scooting: stand by assistance  · Supine to Sit: minimum assistance  · Sit to Supine: minimum assistance  · Transfers:  Sit to Stand:  contact guard assistance with no AD  · Bed to Chair: contact guard assistance with  rolling walker  using  Stand Pivot  · Gait: 12ftx1 using rolling walker with CGA. Limited knee flexion ROM during swing noted bilaterally  · Balance: good static sitting balance; Fair dynamic standing balance    AM-PAC 6 CLICK MOBILITY  Total Score:18       Therapeutic Activities and Exercises:  Co-evaluation with OT.     Patient educated on:   - role of PT/POC    -  safety with all functional mobility   - bed mobility training   - transfer training   - gait training with rolling walker   - spinal precautions   - brace donning   - importance of participation with therapy services   - safest to ambulate with rolling walker and 1 person assist at this time.    Verbalized understanding of all education provided. Needs reinforcement.    Brace donned while seated edge of bed with OT assistance.      Patient left supine with all lines intact, call button in reach, RN notified and OT/transport present. Patient being taken off floor for X-ray.    GOALS:   Multidisciplinary Problems     Physical Therapy Goals        Problem: Physical Therapy Goal    Goal Priority Disciplines Outcome Goal Variances Interventions   Physical Therapy Goal     PT, PT/OT Ongoing (interventions implemented as appropriate)     Description:  Goals to be met by: 18    Patient will increase functional independence with mobility by performin. Supine to sit with Set-up [place]  2. Sit to supine with Set-up [place]  3. Sit to stand transfer with Stand-by Assistance  4. Bed to chair transfer with Stand-by Assistance using Rolling Walker  5. Gait  x 140 feet with Supervision using Rolling Walker.                       History:     Past Medical History:   Diagnosis Date    Cancer     Kidney - right nephrectomy    Coronary artery disease     Hypertension     Pacemaker        Past Surgical History:   Procedure Laterality Date    CARDIAC PACEMAKER PLACEMENT      LAMINECTOMY  4/22/15    L4-S1 laminoforaminotomy    LAMINOTOMY- left L4/5 and L5/S1 laminoforaminotomy Left 2015    [facility]    NEPHRECTOMY Right     SPINE SURGERY  1975    lumbar       Clinical Decision Making:     History  Co-morbidities and personal factors that may impact the plan of care Examination  Body Structures and Functions, activity limitations and participation restrictions that may  impact the plan of care Clinical Presentation   Decision Making/ Complexity Score   Co-morbidities:   [x] Time since onset of injury / illness / exacerbation  [x] Status of current condition  []Patient's cognitive status and safety concerns    [] Multiple Medical Problems (see med hx)  Personal Factors:   [] Patient's age  [] Prior Level of function   [] Patient's home situation (environment and family support)  [] Patient's level of motivation  [] Expected progression of patient      HISTORY:(criteria)    [] 27291 - no personal factors/history    [x] 98482 - has 1-2 personal factor/comorbidity     [] 89961 - has >3 personal factor/comorbidity     Body Regions:  [] Objective examination findings  [] Head     []  Neck  [] Trunk   [] Upper Extremity  [] Lower Extremity    Body Systems:  [] For communication ability, affect, cognition, language, and learning style: the assessment of the ability to make needs known, consciousness, orientation (person, place, and time), expected emotional /behavioral responses, and learning preferences (eg, learning barriers, education  needs)  [x] For the neuromuscular system: a general assessment of gross coordinated movement (eg, balance, gait, locomotion, transfers, and transitions) and motor function  (motor control and motor learning)  [x] For the musculoskeletal system: the assessment of gross symmetry, gross range of motion, gross strength, height, and weight  [x] For the integumentary system: the assessment of pliability(texture), presence of scar formation, skin color, and skin integrity  [] For cardiovascular/pulmonary system: the assessment of heart rate, respiratory rate, blood pressure, and edema     Activity limitations:    [] Patient's cognitive status and saf ety concerns          [] Status of current condition      [] Weight bearing restriction  [] Cardiopulmunary Restriction    Participation Restrictions:   [] Goals and goal agreement with the patient     [] Rehab  potential (prognosis) and probable outcome      Examination of Body System: (criteria)    [] 64553 - addressing 1-2 elements    [x] 58630 - addressing a total of 3 or more elements     [] 10985 -  Addressing a total of 4 or more elements         Clinical Presentation: (criteria)  Stable - 27806     On examination of body system using standardized tests and measures patient presents with 3 or more elements from any of the following: body structures and functions, activity limitations, and/or participation restrictions.  Leading to a clinical presentation that is considered stable and/or uncomplicated                              Clinical Decision Making  (Eval Complexity):  Low- 31330     Time Tracking:     PT Received On: 09/11/18  PT Start Time: 0844     PT Stop Time: 0857  PT Total Time (min): 13 min     Billable Minutes: Evaluation 13      Luis Alfredo Claudio III, PT  09/11/2018

## 2018-09-11 NOTE — ANESTHESIA POSTPROCEDURE EVALUATION
"Anesthesia Post Evaluation    Patient: Jarrett Blanco    Procedure(s) Performed: Procedure(s) (LRB):  Left L3-4 and L4-5 XLIF and MIS TLIF L5-S1 with L3-S1 perc instrumentation (Left)  ASPIRATION, BONE MARROW (N/A)    Final Anesthesia Type: general  Patient location during evaluation: floor  Patient participation: Yes- Able to Participate  Level of consciousness: awake and alert  Post-procedure vital signs: reviewed and stable  Pain management: adequate  Airway patency: patent  PONV status at discharge: No PONV  Anesthetic complications: no      Cardiovascular status: blood pressure returned to baseline  Respiratory status: spontaneous ventilation and room air  Hydration status: euvolemic  Follow-up not needed.        Visit Vitals  BP (!) 142/76 (BP Location: Left arm, Patient Position: Lying)   Pulse 76   Temp 36.7 °C (98 °F) (Oral)   Resp 15   Ht 6' 1" (1.854 m)   Wt (!) 145.2 kg (320 lb)   SpO2 (!) 94%   BMI 42.22 kg/m²       Pain/Eduarda Score: Pain Assessment Performed: Yes (9/10/2018 10:53 PM)  Presence of Pain: complains of pain/discomfort (9/10/2018 10:53 PM)  Pain Rating Prior to Med Admin: 7 (9/11/2018  3:36 AM)  Pain Rating Post Med Admin: 0 (9/11/2018  4:36 AM)  Eduarda Score: 9 (9/10/2018  7:30 PM)        "

## 2018-09-11 NOTE — PLAN OF CARE
Problem: Patient Care Overview  Goal: Plan of Care Review  Outcome: Ongoing (interventions implemented as appropriate)  POC reviewed with patient and family. AAOx4. VSS. Prn pain medication administered for back/leg pain. Pt wanted to continue holm until working with physical therapy. Incision C/D/I. IVF D/C'd, pt tolerating PO intake and diet advanced. No acute events overnight. WCTM.

## 2018-09-12 LAB
ANION GAP SERPL CALC-SCNC: 10 MMOL/L
BASOPHILS # BLD AUTO: 0.02 K/UL
BASOPHILS NFR BLD: 0.2 %
BUN SERPL-MCNC: 15 MG/DL
CALCIUM SERPL-MCNC: 8.8 MG/DL
CHLORIDE SERPL-SCNC: 94 MMOL/L
CO2 SERPL-SCNC: 24 MMOL/L
CREAT SERPL-MCNC: 0.9 MG/DL
DIFFERENTIAL METHOD: ABNORMAL
EOSINOPHIL # BLD AUTO: 0 K/UL
EOSINOPHIL NFR BLD: 0.1 %
ERYTHROCYTE [DISTWIDTH] IN BLOOD BY AUTOMATED COUNT: 13.2 %
EST. GFR  (AFRICAN AMERICAN): >60 ML/MIN/1.73 M^2
EST. GFR  (NON AFRICAN AMERICAN): >60 ML/MIN/1.73 M^2
GLUCOSE SERPL-MCNC: 94 MG/DL
HCT VFR BLD AUTO: 37.9 %
HGB BLD-MCNC: 11.9 G/DL
IMM GRANULOCYTES # BLD AUTO: 0.05 K/UL
IMM GRANULOCYTES NFR BLD AUTO: 0.4 %
LYMPHOCYTES # BLD AUTO: 1.4 K/UL
LYMPHOCYTES NFR BLD: 10.8 %
MCH RBC QN AUTO: 28.3 PG
MCHC RBC AUTO-ENTMCNC: 31.4 G/DL
MCV RBC AUTO: 90 FL
MONOCYTES # BLD AUTO: 1.3 K/UL
MONOCYTES NFR BLD: 10.1 %
NEUTROPHILS # BLD AUTO: 10.1 K/UL
NEUTROPHILS NFR BLD: 78.4 %
NRBC BLD-RTO: 0 /100 WBC
PLATELET # BLD AUTO: 232 K/UL
PMV BLD AUTO: 8.8 FL
POTASSIUM SERPL-SCNC: 4.4 MMOL/L
RBC # BLD AUTO: 4.21 M/UL
SODIUM SERPL-SCNC: 128 MMOL/L
WBC # BLD AUTO: 12.89 K/UL

## 2018-09-12 PROCEDURE — 99024 POSTOP FOLLOW-UP VISIT: CPT | Mod: ,,, | Performed by: PHYSICIAN ASSISTANT

## 2018-09-12 PROCEDURE — 80048 BASIC METABOLIC PNL TOTAL CA: CPT

## 2018-09-12 PROCEDURE — 25000003 PHARM REV CODE 250: Performed by: PHYSICIAN ASSISTANT

## 2018-09-12 PROCEDURE — 63600175 PHARM REV CODE 636 W HCPCS: Performed by: STUDENT IN AN ORGANIZED HEALTH CARE EDUCATION/TRAINING PROGRAM

## 2018-09-12 PROCEDURE — 20600001 HC STEP DOWN PRIVATE ROOM

## 2018-09-12 PROCEDURE — 25000003 PHARM REV CODE 250: Performed by: NEUROLOGICAL SURGERY

## 2018-09-12 PROCEDURE — 25000003 PHARM REV CODE 250: Performed by: STUDENT IN AN ORGANIZED HEALTH CARE EDUCATION/TRAINING PROGRAM

## 2018-09-12 PROCEDURE — 36415 COLL VENOUS BLD VENIPUNCTURE: CPT

## 2018-09-12 PROCEDURE — 63600175 PHARM REV CODE 636 W HCPCS: Performed by: PHYSICIAN ASSISTANT

## 2018-09-12 PROCEDURE — 85025 COMPLETE CBC W/AUTO DIFF WBC: CPT

## 2018-09-12 RX ORDER — OXYCODONE HYDROCHLORIDE 5 MG/1
10 TABLET ORAL EVERY 4 HOURS PRN
Status: DISCONTINUED | OUTPATIENT
Start: 2018-09-12 | End: 2018-09-13 | Stop reason: HOSPADM

## 2018-09-12 RX ORDER — DIAZEPAM 5 MG/1
5 TABLET ORAL EVERY 6 HOURS PRN
Status: DISCONTINUED | OUTPATIENT
Start: 2018-09-12 | End: 2018-09-13 | Stop reason: HOSPADM

## 2018-09-12 RX ORDER — NAPROXEN SODIUM 220 MG/1
81 TABLET, FILM COATED ORAL DAILY
Status: DISCONTINUED | OUTPATIENT
Start: 2018-09-12 | End: 2018-09-13 | Stop reason: HOSPADM

## 2018-09-12 RX ORDER — AMOXICILLIN 250 MG
2 CAPSULE ORAL 2 TIMES DAILY
Status: DISCONTINUED | OUTPATIENT
Start: 2018-09-12 | End: 2018-09-13 | Stop reason: HOSPADM

## 2018-09-12 RX ORDER — ACETAMINOPHEN 10 MG/ML
1000 INJECTION, SOLUTION INTRAVENOUS EVERY 8 HOURS
Status: COMPLETED | OUTPATIENT
Start: 2018-09-12 | End: 2018-09-13

## 2018-09-12 RX ORDER — KETOROLAC TROMETHAMINE 30 MG/ML
30 INJECTION, SOLUTION INTRAMUSCULAR; INTRAVENOUS ONCE
Status: DISCONTINUED | OUTPATIENT
Start: 2018-09-12 | End: 2018-09-12

## 2018-09-12 RX ORDER — BACITRACIN 500 [USP'U]/G
OINTMENT TOPICAL 3 TIMES DAILY
Status: DISCONTINUED | OUTPATIENT
Start: 2018-09-12 | End: 2018-09-13 | Stop reason: HOSPADM

## 2018-09-12 RX ORDER — ACETAMINOPHEN 10 MG/ML
1000 INJECTION, SOLUTION INTRAVENOUS EVERY 8 HOURS
Status: DISCONTINUED | OUTPATIENT
Start: 2018-09-12 | End: 2018-09-12

## 2018-09-12 RX ADMIN — STANDARDIZED SENNA CONCENTRATE AND DOCUSATE SODIUM 2 TABLET: 8.6; 5 TABLET, FILM COATED ORAL at 08:09

## 2018-09-12 RX ADMIN — OXYCODONE HYDROCHLORIDE 10 MG: 5 TABLET ORAL at 08:09

## 2018-09-12 RX ADMIN — BACITRACIN: 500 OINTMENT TOPICAL at 08:09

## 2018-09-12 RX ADMIN — HEPARIN SODIUM 5000 UNITS: 5000 INJECTION, SOLUTION INTRAVENOUS; SUBCUTANEOUS at 03:09

## 2018-09-12 RX ADMIN — OLMESARTAN MEDOXOMIL 20 MG: 20 TABLET, FILM COATED ORAL at 10:09

## 2018-09-12 RX ADMIN — ASPIRIN 81 MG CHEWABLE TABLET 81 MG: 81 TABLET CHEWABLE at 05:09

## 2018-09-12 RX ADMIN — ATENOLOL 25 MG: 25 TABLET ORAL at 08:09

## 2018-09-12 RX ADMIN — ACETAMINOPHEN 1000 MG: 10 INJECTION, SOLUTION INTRAVENOUS at 12:09

## 2018-09-12 RX ADMIN — ACETAMINOPHEN 1000 MG: 10 INJECTION, SOLUTION INTRAVENOUS at 09:09

## 2018-09-12 RX ADMIN — SODIUM CHLORIDE TAB 1 GM 2 G: 1 TAB at 11:09

## 2018-09-12 RX ADMIN — OXYCODONE HYDROCHLORIDE 10 MG: 5 TABLET ORAL at 05:09

## 2018-09-12 RX ADMIN — HEPARIN SODIUM 5000 UNITS: 5000 INJECTION, SOLUTION INTRAVENOUS; SUBCUTANEOUS at 06:09

## 2018-09-12 RX ADMIN — SODIUM CHLORIDE TAB 1 GM 2 G: 1 TAB at 08:09

## 2018-09-12 RX ADMIN — DIAZEPAM 5 MG: 5 TABLET ORAL at 08:09

## 2018-09-12 RX ADMIN — HEPARIN SODIUM 5000 UNITS: 5000 INJECTION, SOLUTION INTRAVENOUS; SUBCUTANEOUS at 10:09

## 2018-09-12 RX ADMIN — BACITRACIN: 500 OINTMENT TOPICAL at 03:09

## 2018-09-12 RX ADMIN — DIAZEPAM 5 MG: 5 TABLET ORAL at 12:09

## 2018-09-12 RX ADMIN — METHOCARBAMOL 750 MG: 750 TABLET ORAL at 05:09

## 2018-09-12 RX ADMIN — BISACODYL 10 MG: 10 SUPPOSITORY RECTAL at 08:09

## 2018-09-12 RX ADMIN — METHOCARBAMOL 750 MG: 750 TABLET ORAL at 12:09

## 2018-09-12 RX ADMIN — METHOCARBAMOL 750 MG: 750 TABLET ORAL at 08:09

## 2018-09-12 RX ADMIN — HYDROCHLOROTHIAZIDE 12.5 MG: 12.5 TABLET ORAL at 10:09

## 2018-09-12 RX ADMIN — OXYCODONE HYDROCHLORIDE 10 MG: 5 TABLET ORAL at 02:09

## 2018-09-12 NOTE — SUBJECTIVE & OBJECTIVE
Interval History: patient had episode of shivering overnight and acute worsening of LLE pain. Pain is from left knee to left ankle (front and lateral aspect). It is severe aching 10/10 and current regimen not helping. It had this many years ago but had resolved spontaneously. He has mild incisional pain that is tolerable. Constipation since Sunday but had large BM today after suppository, so he has some improvements in abd discomfort. Denies any nausea/vomiting. Appetite improving. Also, Hyponatremia at baseline but sodium is 128 today (131 on admit). Otherwise, tolerating diet and voiding appropriately.     Medications:  Continuous Infusions:  Scheduled Meds:   acetaminophen  1,000 mg Intravenous Q8H    aspirin  81 mg Oral Daily    atenolol  25 mg Oral BID    bacitracin   Topical (Top) TID    bisacodyl  10 mg Rectal Daily    heparin (porcine)  5,000 Units Subcutaneous Q8H    hydroCHLOROthiazide  12.5 mg Oral Daily    methocarbamol  750 mg Oral QID    mupirocin  1 g Nasal BID    olmesartan  20 mg Oral Daily    rosuvastatin  20 mg Oral QHS    senna-docusate 8.6-50 mg  2 tablet Oral BID    sodium chloride  2 g Oral BID     PRN Meds:acetaminophen, aluminum-magnesium hydroxide-simethicone, diazePAM, morphine, ondansetron, oxyCODONE, promethazine (PHENERGAN) IVPB     Review of Systems  Objective:     Weight: (!) 145.2 kg (320 lb)  Body mass index is 42.22 kg/m².  Vital Signs (Most Recent):  Temp: 98.7 °F (37.1 °C) (09/12/18 1646)  Pulse: 75 (09/12/18 1646)  Resp: 18 (09/12/18 1646)  BP: 109/60 (09/12/18 1646)  SpO2: 97 % (09/12/18 1646) Vital Signs (24h Range):  Temp:  [98.1 °F (36.7 °C)-98.9 °F (37.2 °C)] 98.7 °F (37.1 °C)  Pulse:  [72-86] 75  Resp:  [18] 18  SpO2:  [90 %-99 %] 97 %  BP: (109-146)/(53-79) 109/60                           Neurosurgery Physical Exam   General: well developed, well nourished. no acute distress.  Neurologic: Awake, alert and oriented x3. Obese. Thought content  appropriate.  Head: normocephalic, atraumatic   GCS: Motor: 6/Verbal: 5/Eyes: 4 GCS Total: 15  Cranial nerves:face symmetric and sensation intact bilaterally, tongue midline, pupils equal, round, reactive to light with accomodation, extraocular muscles intact. CN II-XII grossly intact. Shoulder shrug strength equal. Palate rises symmetrically.  Uvula midline. Hearing equal with finger rub. Gag and taste not tested.   Language: no aphasia  Speech: no dysarthria   Sensory: response to light touch throughout  Motor Strength: Moves all extremities spontaneously with good tone. Full strength upper and lower extremities. No abnormal movements seen.         Strength  Deltoids Triceps Biceps Wrist Extension Wrist Flexion Hand    Upper: R 5/5 5/5 5/5 5/5 5/5 5/5    L 5/5 5/5 5/5 5/5 5/5 5/5     Iliopsoas Quadriceps Knee  Flexion Tibialis  anterior Gastro- cnemius EHL   Lower: R 5/5 5/5 5/5 5/5 5/5 5/5    L 5/5 5/5 5/5 5/5 5/5 5/5   Interossi muscle strength- intact    DTR: 2+ and symmetric in UE and LE   Clonus: absent  Babinski: absent   Gait: slow with rolling walker  Straight leg raise: negative bilaterally  Lhermittes: Negative  Full cervical and limited lumbar ROM  No focal numbness or weakness  ENT: normal hearing with finger rub  Heart: RRR, no cyanosis, pallor, or edema.   Lungs:  normal respiratory effort  Abdomen: soft, slightly distended, with generalized mild tenderness and symmetric. No guarding or rebound present.   Extremities: warm with no cyanosis, edema, or clubbing  Pulses: palpable distal pulses  Skin: warm, dry and intact. No visible rashes or lesions.     Left leg no TTP, erythema, or swelling.       Lumbar midline and left lateral incision:  wound is c/d/i, no signs of infection, no erythema, warmth, edema,   no drainage.  wound edges are well approximated. Some superficial ecchymosis and ttp present.              Significant Labs:  Recent Labs   Lab  09/10/18   1727  09/11/18   1759  09/12/18    0352   GLU  165*  107  94   NA  130*  128*  128*   K  4.3  4.6  4.4   CL  98  94*  94*   CO2  20*  25  24   BUN  11  14  15   CREATININE  1.0  1.1  0.9   CALCIUM  8.3*  9.3  8.8     Recent Labs   Lab  09/10/18   1727  09/11/18   1800  09/12/18   0352   WBC  15.69*  13.97*  12.89*   HGB  13.1*  12.5*  11.9*   HCT  40.6  39.0*  37.9*   PLT  244  252  232     No results for input(s): LABPT, INR, APTT in the last 48 hours.  Microbiology Results (last 7 days)     ** No results found for the last 168 hours. **            Significant Diagnostics:  Post-op films personally reviewed and showed good hardware position at L4-S1

## 2018-09-12 NOTE — PROGRESS NOTES
[facility]  Neurosurgery  Progress Note    Subjective:     History of Present Illness: Jarrett Blanco is 68 y.o. male with history of previous right nephrectomy d/t cancer, hyponatremia, HTN, HLD, has pacemaker on asa 81mg, and previous L4-S1 laminotomy who has worsening low back pain across the whole lower back moving into the L5 with associated paresthesias into the lateral right leg. He denies any new onset of weakness. The patient describes the pain as dull. The patient rates the pain 4 on the pain scale. The patient has now been suffering with this pain for 45 year(s), and it originally started with a sports injury, and increased gradually. The pain is worse  for no specific reason and better with lying down, sitting and elevating feet. The patient currently denies any changes in bowel or bladder control. He presents for elective L4-S1 fusion.            Post-Op Info:  Procedure(s) (LRB):  Left L3-4 and L4-5 XLIF and MIS TLIF L5-S1 with L3-S1 perc instrumentation (Left)  ASPIRATION, BONE MARROW (N/A)   2 Days Post-Op     Interval History: patient had episode of shivering overnight and acute worsening of LLE pain. Pain is from left knee to left ankle (front and lateral aspect). It is severe aching 10/10 and current regimen not helping. It had this many years ago but had resolved spontaneously. He has mild incisional pain that is tolerable. Constipation since Sunday but had large BM today after suppository, so he has some improvements in abd discomfort. Denies any nausea/vomiting. Appetite improving. Also, Hyponatremia at baseline but sodium is 128 today (131 on admit). Otherwise, tolerating diet and voiding appropriately.     Medications:  Continuous Infusions:  Scheduled Meds:   acetaminophen  1,000 mg Intravenous Q8H    aspirin  81 mg Oral Daily    atenolol  25 mg Oral BID    bacitracin   Topical (Top) TID    bisacodyl  10 mg Rectal Daily    heparin (porcine)  5,000 Units Subcutaneous  Q8H    hydroCHLOROthiazide  12.5 mg Oral Daily    methocarbamol  750 mg Oral QID    mupirocin  1 g Nasal BID    olmesartan  20 mg Oral Daily    rosuvastatin  20 mg Oral QHS    senna-docusate 8.6-50 mg  2 tablet Oral BID    sodium chloride  2 g Oral BID     PRN Meds:acetaminophen, aluminum-magnesium hydroxide-simethicone, diazePAM, morphine, ondansetron, oxyCODONE, promethazine (PHENERGAN) IVPB     Review of Systems  Objective:     Weight: (!) 145.2 kg (320 lb)  Body mass index is 42.22 kg/m².  Vital Signs (Most Recent):  Temp: 98.7 °F (37.1 °C) (09/12/18 1646)  Pulse: 75 (09/12/18 1646)  Resp: 18 (09/12/18 1646)  BP: 109/60 (09/12/18 1646)  SpO2: 97 % (09/12/18 1646) Vital Signs (24h Range):  Temp:  [98.1 °F (36.7 °C)-98.9 °F (37.2 °C)] 98.7 °F (37.1 °C)  Pulse:  [72-86] 75  Resp:  [18] 18  SpO2:  [90 %-99 %] 97 %  BP: (109-146)/(53-79) 109/60                           Neurosurgery Physical Exam   General: well developed, well nourished. no acute distress.  Neurologic: Awake, alert and oriented x3. Obese. Thought content appropriate.  Head: normocephalic, atraumatic   GCS: Motor: 6/Verbal: 5/Eyes: 4 GCS Total: 15  Cranial nerves:face symmetric and sensation intact bilaterally, tongue midline, pupils equal, round, reactive to light with accomodation, extraocular muscles intact. CN II-XII grossly intact. Shoulder shrug strength equal. Palate rises symmetrically.  Uvula midline. Hearing equal with finger rub. Gag and taste not tested.   Language: no aphasia  Speech: no dysarthria   Sensory: response to light touch throughout  Motor Strength: Moves all extremities spontaneously with good tone. Full strength upper and lower extremities. No abnormal movements seen.         Strength  Deltoids Triceps Biceps Wrist Extension Wrist Flexion Hand    Upper: R 5/5 5/5 5/5 5/5 5/5 5/5    L 5/5 5/5 5/5 5/5 5/5 5/5     Iliopsoas Quadriceps Knee  Flexion Tibialis  anterior Gastro- cnemius EHL   Lower: R 5/5 5/5 5/5 5/5 5/5  5/5    L 5/5 5/5 5/5 5/5 5/5 5/5   Interossi muscle strength- intact    DTR: 2+ and symmetric in UE and LE   Clonus: absent  Babinski: absent   Gait: slow with rolling walker  Straight leg raise: negative bilaterally  Lhermittes: Negative  Full cervical and limited lumbar ROM  No focal numbness or weakness  ENT: normal hearing with finger rub  Heart: RRR, no cyanosis, pallor, or edema.   Lungs:  normal respiratory effort  Abdomen: soft, slightly distended, with generalized mild tenderness and symmetric. No guarding or rebound present.   Extremities: warm with no cyanosis, edema, or clubbing  Pulses: palpable distal pulses  Skin: warm, dry and intact. No visible rashes or lesions.     Left leg no TTP, erythema, or swelling.       Lumbar midline and left lateral incision:  wound is c/d/i, no signs of infection, no erythema, warmth, edema,   no drainage.  wound edges are well approximated. Some superficial ecchymosis and ttp present.              Significant Labs:  Recent Labs   Lab  09/10/18   1727  09/11/18   1759  09/12/18   0352   GLU  165*  107  94   NA  130*  128*  128*   K  4.3  4.6  4.4   CL  98  94*  94*   CO2  20*  25  24   BUN  11  14  15   CREATININE  1.0  1.1  0.9   CALCIUM  8.3*  9.3  8.8     Recent Labs   Lab  09/10/18   1727  09/11/18   1800  09/12/18   0352   WBC  15.69*  13.97*  12.89*   HGB  13.1*  12.5*  11.9*   HCT  40.6  39.0*  37.9*   PLT  244  252  232     No results for input(s): LABPT, INR, APTT in the last 48 hours.  Microbiology Results (last 7 days)     ** No results found for the last 168 hours. **            Significant Diagnostics:  Post-op films personally reviewed and showed good hardware position at L4-S1           Assessment/Plan:     Lumbar stenosis with neurogenic claudication    68 y.o. male with history of HTN, HLD, has pacemaker on asa 81mg, and previous L4-S1 laminotomy who has worsening low back pain and RLE pain. He is s/p Posterolateral L4-S1 fusion with Dr. An,  POD#2    -Patient is neurologically stable.  -Post op imaging stable.   -Pain poorly controlled with current regimen overnight. He has worsening of previous LLE pain and LLE muscle spasms evident on exam today. Pain meds adjusted. Added iv tylenol 1G q8h x24, oxy IR 10mg q4h, and start valium 5mg q6h prn muscle spasms. DC robaxin. Previously pt resisted flexeril > valium d/t drowsiness.  Patient did have some improvements with LLE pain since initiated valim and IV tylenol. Will consider 1x dose of IM toradol 30mg if worsening symptom over night. Discussed with patient and wife, they would like to hold off on toradol at this time unless symptoms worsened.   -Bacitracin to incision BID   -LSO when OOB or with activity  -PT/OT/OOB daily  -Patient must be OOB for atleast 6 hours daily (may be in intervals: 2 hours in chair with each meal)  -IS to bed side. Patient to use atleast 10x every hour.  -Continue bowel regimen daily. BM today 09/12/2018 after suppository. No acute abdomen findings on exam. +flatus.   -Continue SQH, Teds and SCDs for DVTP.  -Restarted all home meds for  HTN and HLD. Resume home ASA 81mg today.  -therapy recommending HH with bedside commode and rolling walker but patient is refusing HH and bedside commode. He will consider therapy after 2 wk post op visit.   -Voiding appropriately after holm removal today.   -leukocytosis, trending down 12.89 today. Patient afebrile. Will continue to monitor.   -Chronic hyponatremia: 131 on admission but today 128. Increase salt tabs to 2g BID. Will continue to monitor. Patient will follow up with PCP outpatient.     Dispo: DC home with HH tomorrow. Patient has 2 wk wound check with NSR nurse and 6 week follow up with Dr. An      Discussed with Dr. Juve Dorantes PA-C  Neurosurgery  [facility]

## 2018-09-12 NOTE — PLAN OF CARE
Problem: Patient Care Overview  Goal: Plan of Care Review  Outcome: Ongoing (interventions implemented as appropriate)  Poc reviewed at bedside. Pt alert and oriented x4. Pain controled with IV tylenol and valium. Possible d/c. Safety maintained. Call light in reach. qhr rounding in progress.

## 2018-09-12 NOTE — PLAN OF CARE
Problem: Patient Care Overview  Goal: Plan of Care Review  Outcome: Ongoing (interventions implemented as appropriate)  VSS. Call light in reach. Wife @ bedside. Pt complains of pain running down his left leg. Pt unable to have a BM. Stool softeners given. No other issues over night. Will continue to monitor pt.

## 2018-09-12 NOTE — ASSESSMENT & PLAN NOTE
68 y.o. male with history of HTN, HLD, has pacemaker on asa 81mg, and previous L4-S1 laminotomy who has worsening low back pain and RLE pain. He is s/p Posterolateral L4-S1 fusion with Dr. An, POD#2    -Patient is neurologically stable.  -Post op imaging stable.   -Pain poorly controlled with current regimen overnight. He has worsening of previous LLE pain and LLE muscle spasms evident on exam today. Pain meds adjusted. Added iv tylenol 1G q8h x24, oxy IR 10mg q4h, and start valium 5mg q6h prn muscle spasms. DC robaxin. Previously pt resisted flexeril > valium d/t drowsiness.  Patient did have some improvements with LLE pain since initiated valim and IV tylenol. Will consider 1x dose of IM toradol 30mg if worsening symptom over night. Discussed with patient and wife, they would like to hold off on toradol at this time unless symptoms worsened.   -Bacitracin to incision BID   -LSO when OOB or with activity  -PT/OT/OOB daily  -Patient must be OOB for atleast 6 hours daily (may be in intervals: 2 hours in chair with each meal)  -IS to bed side. Patient to use atleast 10x every hour.  -Continue bowel regimen daily. BM today 09/12/2018 after suppository. No acute abdomen findings on exam. +flatus.   -Continue SQH, Teds and SCDs for DVTP.  -Restarted all home meds for  HTN and HLD. Resume home ASA 81mg today.  -therapy recommending HH with bedside commode and rolling walker but patient is refusing HH and bedside commode. He will consider therapy after 2 wk post op visit.   -Voiding appropriately after holm removal today.   -leukocytosis, trending down 12.89 today. Patient afebrile. Will continue to monitor.   -Chronic hyponatremia: 131 on admission but today 128. Increase salt tabs to 2g BID. Will continue to monitor. Patient will follow up with PCP outpatient.     Dispo: DC home with HH tomorrow. Patient has 2 wk wound check with NSR nurse and 6 week follow up with Dr. An      Discussed with Dr. An

## 2018-09-12 NOTE — NURSING
Pt complaining of left leg pain states all night it was hurting.Gave pain meds and  Paged neuro surgery call back from [facility] stated she does not have that patient gave me a pager for perry pagejose amador awaiting call back.

## 2018-09-13 VITALS
BODY MASS INDEX: 41.75 KG/M2 | OXYGEN SATURATION: 95 % | TEMPERATURE: 98 F | DIASTOLIC BLOOD PRESSURE: 59 MMHG | HEIGHT: 73 IN | HEART RATE: 72 BPM | WEIGHT: 315 LBS | RESPIRATION RATE: 18 BRPM | SYSTOLIC BLOOD PRESSURE: 112 MMHG

## 2018-09-13 LAB
ANION GAP SERPL CALC-SCNC: 7 MMOL/L
BASOPHILS # BLD AUTO: 0.03 K/UL
BASOPHILS NFR BLD: 0.3 %
BUN SERPL-MCNC: 15 MG/DL
CALCIUM SERPL-MCNC: 8.7 MG/DL
CHLORIDE SERPL-SCNC: 94 MMOL/L
CO2 SERPL-SCNC: 27 MMOL/L
CREAT SERPL-MCNC: 0.8 MG/DL
DIFFERENTIAL METHOD: ABNORMAL
EOSINOPHIL # BLD AUTO: 0.1 K/UL
EOSINOPHIL NFR BLD: 0.6 %
ERYTHROCYTE [DISTWIDTH] IN BLOOD BY AUTOMATED COUNT: 13.4 %
EST. GFR  (AFRICAN AMERICAN): >60 ML/MIN/1.73 M^2
EST. GFR  (NON AFRICAN AMERICAN): >60 ML/MIN/1.73 M^2
GLUCOSE SERPL-MCNC: 95 MG/DL
HCT VFR BLD AUTO: 34.7 %
HGB BLD-MCNC: 11 G/DL
IMM GRANULOCYTES # BLD AUTO: 0.04 K/UL
IMM GRANULOCYTES NFR BLD AUTO: 0.5 %
LYMPHOCYTES # BLD AUTO: 2.1 K/UL
LYMPHOCYTES NFR BLD: 23.5 %
MCH RBC QN AUTO: 28.2 PG
MCHC RBC AUTO-ENTMCNC: 31.7 G/DL
MCV RBC AUTO: 89 FL
MONOCYTES # BLD AUTO: 0.8 K/UL
MONOCYTES NFR BLD: 9.4 %
NEUTROPHILS # BLD AUTO: 5.8 K/UL
NEUTROPHILS NFR BLD: 65.7 %
NRBC BLD-RTO: 0 /100 WBC
PLATELET # BLD AUTO: 226 K/UL
PMV BLD AUTO: 8.9 FL
POTASSIUM SERPL-SCNC: 3.9 MMOL/L
RBC # BLD AUTO: 3.9 M/UL
SODIUM SERPL-SCNC: 128 MMOL/L
WBC # BLD AUTO: 8.75 K/UL

## 2018-09-13 PROCEDURE — 63600175 PHARM REV CODE 636 W HCPCS: Performed by: STUDENT IN AN ORGANIZED HEALTH CARE EDUCATION/TRAINING PROGRAM

## 2018-09-13 PROCEDURE — 25000003 PHARM REV CODE 250: Performed by: PHYSICIAN ASSISTANT

## 2018-09-13 PROCEDURE — 63600175 PHARM REV CODE 636 W HCPCS: Performed by: PHYSICIAN ASSISTANT

## 2018-09-13 PROCEDURE — 25000003 PHARM REV CODE 250: Performed by: NEUROLOGICAL SURGERY

## 2018-09-13 PROCEDURE — 25000003 PHARM REV CODE 250: Performed by: STUDENT IN AN ORGANIZED HEALTH CARE EDUCATION/TRAINING PROGRAM

## 2018-09-13 PROCEDURE — 36415 COLL VENOUS BLD VENIPUNCTURE: CPT

## 2018-09-13 PROCEDURE — 85025 COMPLETE CBC W/AUTO DIFF WBC: CPT

## 2018-09-13 PROCEDURE — 80048 BASIC METABOLIC PNL TOTAL CA: CPT

## 2018-09-13 RX ORDER — ACETAMINOPHEN 500 MG
1000 TABLET ORAL EVERY 8 HOURS PRN
Qty: 60 TABLET | Refills: 0 | Status: SHIPPED | OUTPATIENT
Start: 2018-09-13

## 2018-09-13 RX ORDER — DIAZEPAM 5 MG/1
5 TABLET ORAL EVERY 6 HOURS PRN
Qty: 60 TABLET | Refills: 0 | Status: SHIPPED | OUTPATIENT
Start: 2018-09-13 | End: 2018-09-25

## 2018-09-13 RX ORDER — OXYCODONE HYDROCHLORIDE 10 MG/1
10 TABLET ORAL EVERY 4 HOURS PRN
Qty: 60 TABLET | Refills: 0 | OUTPATIENT
Start: 2018-09-13 | End: 2019-12-01

## 2018-09-13 RX ADMIN — HEPARIN SODIUM 5000 UNITS: 5000 INJECTION, SOLUTION INTRAVENOUS; SUBCUTANEOUS at 05:09

## 2018-09-13 RX ADMIN — STANDARDIZED SENNA CONCENTRATE AND DOCUSATE SODIUM 2 TABLET: 8.6; 5 TABLET, FILM COATED ORAL at 08:09

## 2018-09-13 RX ADMIN — ATENOLOL 25 MG: 25 TABLET ORAL at 08:09

## 2018-09-13 RX ADMIN — BACITRACIN: 500 OINTMENT TOPICAL at 08:09

## 2018-09-13 RX ADMIN — METHOCARBAMOL 750 MG: 750 TABLET ORAL at 08:09

## 2018-09-13 RX ADMIN — ASPIRIN 81 MG CHEWABLE TABLET 81 MG: 81 TABLET CHEWABLE at 08:09

## 2018-09-13 RX ADMIN — SODIUM CHLORIDE TAB 1 GM 2 G: 1 TAB at 08:09

## 2018-09-13 RX ADMIN — ACETAMINOPHEN 1000 MG: 10 INJECTION, SOLUTION INTRAVENOUS at 05:09

## 2018-09-13 NOTE — PROGRESS NOTES
[facility]  Neurosurgery  Progress Note    Subjective:     History of Present Illness: Jarrett Blanco is 68 y.o. male with history of previous right nephrectomy d/t cancer, hyponatremia, HTN, HLD, has pacemaker on asa 81mg, and previous L4-S1 laminotomy who has worsening low back pain across the whole lower back moving into the L5 with associated paresthesias into the lateral right leg. He denies any new onset of weakness. The patient describes the pain as dull. The patient rates the pain 4 on the pain scale. The patient has now been suffering with this pain for 45 year(s), and it originally started with a sports injury, and increased gradually. The pain is worse  for no specific reason and better with lying down, sitting and elevating feet. The patient currently denies any changes in bowel or bladder control. He presents for elective L4-S1 fusion.            Post-Op Info:  Procedure(s) (LRB):  Left L3-4 and L4-5 XLIF and MIS TLIF L5-S1 with L3-S1 perc instrumentation (Left)  ASPIRATION, BONE MARROW (N/A)   3 Days Post-Op     Interval History: Pt reports LLE pain is improved since yesterday. He denies weakness, paresthesias, or back pain. Tolerating diet and voiding.     Medications:  Continuous Infusions:  Scheduled Meds:   aspirin  81 mg Oral Daily    atenolol  25 mg Oral BID    bacitracin   Topical (Top) TID    bisacodyl  10 mg Rectal Daily    heparin (porcine)  5,000 Units Subcutaneous Q8H    hydroCHLOROthiazide  12.5 mg Oral Daily    methocarbamol  750 mg Oral QID    mupirocin  1 g Nasal BID    olmesartan  20 mg Oral Daily    rosuvastatin  20 mg Oral QHS    senna-docusate 8.6-50 mg  2 tablet Oral BID    sodium chloride  2 g Oral BID     PRN Meds:acetaminophen, aluminum-magnesium hydroxide-simethicone, diazePAM, morphine, ondansetron, oxyCODONE, promethazine (PHENERGAN) IVPB     Review of Systems  Objective:     Weight: (!) 148.2 kg (326 lb 11.6 oz)  Body mass index is 43.11  kg/m².  Vital Signs (Most Recent):  Temp: 98.1 °F (36.7 °C) (09/13/18 1143)  Pulse: 72 (09/13/18 1143)  Resp: 18 (09/13/18 1143)  BP: (!) 112/59 (09/13/18 1143)  SpO2: 95 % (09/13/18 1143) Vital Signs (24h Range):  Temp:  [98.1 °F (36.7 °C)-98.8 °F (37.1 °C)] 98.1 °F (36.7 °C)  Pulse:  [69-75] 72  Resp:  [16-18] 18  SpO2:  [95 %-97 %] 95 %  BP: (104-123)/(55-67) 112/59                           Neurosurgery Physical Exam  General: well developed, well nourished, no distress.   Head: normocephalic, atraumatic  Neurologic: Alert and oriented. Thought content appropriate.  GCS: Motor: 6/Verbal: 5/Eyes: 4 GCS Total: 15  Mental Status: Awake, Alert, Oriented x 4  Language: No aphasia  Speech: No dysarthria  Cranial nerves: face symmetric, tongue midline, CN II-XII grossly intact.   Eyes: pupils equal, round, reactive to light with accomodation, EOMI.   Pulmonary: normal respirations, no signs of respiratory distress  Abdomen: soft, non-distended, not tender to palpation  Sensory: intact to light touch throughout    Motor Strength:Moves all extremities spontaneously with good tone.  Full strength upper and lower extremities. No abnormal movements seen.     Strength  Deltoids Triceps Biceps Wrist Extension Wrist Flexion Hand    Upper: R 5/5 5/5 5/5 5/5 5/5 5/5    L 5/5 5/5 5/5 5/5 5/5 5/5     Iliopsoas Quadriceps Knee  Flexion Tibialis  anterior Gastro- cnemius EHL   Lower: R 5/5 5/5 5/5 5/5 5/5 5/5    L 5/5 5/5 5/5 5/5 5/5 5/5     DTR's - 2 + and symmetric in UE and LE  Ram: absent  Clonus: absent  Pulses: 2+ and symmetric radial and dorsalis pedis.  Skin: Skin is warm, dry and intact.  Incisions c/d/i with no surrounding erythema, edema, or drainage. Ecchymosis present.     Significant Labs:  Recent Labs   Lab  09/11/18   1759  09/12/18   0352  09/13/18   0401   GLU  107  94  95   NA  128*  128*  128*   K  4.6  4.4  3.9   CL  94*  94*  94*   CO2  25  24  27   BUN  14  15  15   CREATININE  1.1  0.9  0.8   CALCIUM   9.3  8.8  8.7     Recent Labs   Lab  09/11/18   1800  09/12/18   0352  09/13/18   0401   WBC  13.97*  12.89*  8.75   HGB  12.5*  11.9*  11.0*   HCT  39.0*  37.9*  34.7*   PLT  252  232  226     No results for input(s): LABPT, INR, APTT in the last 48 hours.  Microbiology Results (last 7 days)     ** No results found for the last 168 hours. **        Recent Lab Results       09/13/18  0401      Immature Granulocytes 0.5     Immature Grans (Abs) 0.04  Comment:  Mild elevation in immature granulocytes is non specific and   can be seen in a variety of conditions including stress response,   acute inflammation, trauma and pregnancy. Correlation with other   laboratory and clinical findings is essential.       Anion Gap 7     Baso # 0.03     Basophil% 0.3     BUN, Bld 15     Calcium 8.7     Chloride 94     CO2 27     Creatinine 0.8     Differential Method Automated     eGFR if African American >60.0     eGFR if non  >60.0  Comment:  Calculation used to obtain the estimated glomerular filtration  rate (eGFR) is the CKD-EPI equation.        Eos # 0.1     Eosinophil% 0.6     Glucose 95     Gran # (ANC) 5.8     Gran% 65.7     Hematocrit 34.7     Hemoglobin 11.0     Lymph # 2.1     Lymph% 23.5     MCH 28.2     MCHC 31.7     MCV 89     Mono # 0.8     Mono% 9.4     MPV 8.9     nRBC 0     Platelets 226     Potassium 3.9     RBC 3.90     RDW 13.4     Sodium 128     WBC 8.75         All pertinent labs from the last 24 hours have been reviewed.    Significant Diagnostics:  No new imaging    Assessment/Plan:     * Lumbar stenosis with neurogenic claudication    68 y.o. male with history of HTN, HLD, has pacemaker on asa 81mg, and previous L4-S1 laminotomy who has worsening low back pain and RLE pain. He is s/p Posterolateral L4-S1 fusion with Dr. An, POD#3    -Patient is neurologically stable.  -Post op imaging stable.   -Pain improved today after med adjustments 9/12/18  -Bacitracin to incision BID   -LSO when  OOB or with activity  -PT/OT/OOB daily  -Patient must be OOB for atleast 6 hours daily (may be in intervals: 2 hours in chair with each meal)  -IS to bed side. Patient to use atleast 10x every hour.  -Continue bowel regimen daily. BM 9/12 after suppository. No acute abdomen findings on exam.    -Continue SQH, Teds and SCDs for DVTP.  -Restarted all home meds for  HTN and HLD. Continue home ASA 81mg.  -therapy recommending HH with bedside commode and rolling walker but patient is refusing HH and bedside commode. He will consider therapy after 2 wk post op visit.   -Voiding appropriately .   -leukocytosis, resolved 8.75 today.   -Chronic hyponatremia: 131 on admission but today 128. Continue salt tabs to 2g BID. Patient will follow up with PCP outpatient.     Dispo: DC home. Patient has 2 wk wound check with NSR nurse and 6 week follow up with Dr. An.     Discussed with SNEHAL BarryC  Neurosurgery  [facility]

## 2018-09-13 NOTE — DISCHARGE SUMMARY
[facility]  Neurosurgery  Discharge Summary      Patient Name: Jarrett Blanco  MRN: [id number]  Admission Date: 9/10/2018  Hospital Length of Stay: 3 days  Discharge Date and Time:  09/13/2018 4:14 PM  Attending Physician: No att. providers found   Discharging Provider: Yennifer Chatterjee PA-C  Primary Care Provider: Luis Alfredo Sands Jr, MD    HPI:   Jarrett Blanco is 68 y.o. male with history of previous right nephrectomy d/t cancer, hyponatremia, HTN, HLD, has pacemaker on asa 81mg, and previous L4-S1 laminotomy who has worsening low back pain across the whole lower back moving into the L5 with associated paresthesias into the lateral right leg. He denies any new onset of weakness. The patient describes the pain as dull. The patient rates the pain 4 on the pain scale. The patient has now been suffering with this pain for 45 year(s), and it originally started with a sports injury, and increased gradually. The pain is worse  for no specific reason and better with lying down, sitting and elevating feet. The patient currently denies any changes in bowel or bladder control. He presents for elective L4-S1 fusion.            Procedure(s) (LRB):  Left L3-4 and L4-5 XLIF and MIS TLIF L5-S1 with L3-S1 perc instrumentation (Left)  ASPIRATION, BONE MARROW (N/A)     Hospital Course: 9/10: L3-4, L4-5 XLIF and L5-S1 TLIF  9/11: Reports continued pain, but previous RLE pain now resolved. He is tolerating diet and voiding appropriately. PT/OT rec HH.  9/12: Worsening of LLE pain and current regimen not helping. Pain meds adjusted. Constipation since Sunday but had large BM today after suppository. Hyponatremia at baseline, today Na 128. Started salt tabs.  9/13: Pt reports LLE pain is improved. Na 128 today. Will dc on salt tabs. He is refusing HH or outpatient therapy referral.           Consults:     Significant Diagnostic Studies: Labs:   BMP:   Recent Labs   Lab  09/11/18   1759  09/12/18   0352  09/13/18   0401  "  GLU  107  94  95   NA  128*  128*  128*   K  4.6  4.4  3.9   CL  94*  94*  94*   CO2  25  24  27   BUN  14  15  15   CREATININE  1.1  0.9  0.8   CALCIUM  9.3  8.8  8.7    and CBC   Recent Labs   Lab  09/11/18   1800  09/12/18   0352  09/13/18   0401   WBC  13.97*  12.89*  8.75   HGB  12.5*  11.9*  11.0*   HCT  39.0*  37.9*  34.7*   PLT  252  232  226       Pending Diagnostic Studies:     None        Final Active Diagnoses:    Diagnosis Date Noted POA    PRINCIPAL PROBLEM:  Lumbar stenosis with neurogenic claudication [M48.062] 09/10/2018 Yes      Problems Resolved During this Admission:      Discharged Condition: good    Disposition: Home or Self Care    Follow Up:  -2 wk wound check  -6 wk post op follow up with XR   -Follow up with PCP within 1-2 wks for hyponatremia w/u    Patient Instructions:      WALKER FOR HOME USE     Order Specific Question Answer Comments   Type of Walker: Heavy duty    With wheels? Yes    Height: 6' 1" (1.854 m)    Weight: 145.2 kg (320 lb)    Length of need (1-99 months): 99    Does patient have medical equipment at home? none    Please check all that apply: Patient is unable to safely ambulate without equipment.    Vendor: [facility]    Expected Date of Delivery: 9/13/2018      Diet Adult Regular     Notify your health care provider if you experience any of the following:  increased confusion or weakness     Notify your health care provider if you experience any of the following:  persistent dizziness, light-headedness, or visual disturbances     Notify your health care provider if you experience any of the following:  worsening rash     Notify your health care provider if you experience any of the following:  severe persistent headache     Notify your health care provider if you experience any of the following:  difficulty breathing or increased cough     Notify your health care provider if you experience any of the following:  redness, tenderness, or signs of infection (pain, " swelling, redness, odor or green/yellow discharge around incision site)     Notify your health care provider if you experience any of the following:  severe uncontrolled pain     Notify your health care provider if you experience any of the following:  persistent nausea and vomiting or diarrhea     Notify your health care provider if you experience any of the following:  temperature >100.4     Activity as tolerated     Medications:  Reconciled Home Medications:      Medication List      START taking these medications    diazePAM 5 MG tablet  Commonly known as:  VALIUM  Take 1 tablet (5 mg total) by mouth every 6 (six) hours as needed (muscle spasm).     oxyCODONE 10 mg Tab immediate release tablet  Commonly known as:  ROXICODONE  Take 1 tablet (10 mg total) by mouth every 4 (four) hours as needed.     sodium chloride 1 gram tablet  Take 2 tablets (2 g total) by mouth 2 (two) times daily.        CONTINUE taking these medications    aspirin 81 MG Chew  Take 81 mg by mouth once daily.     atenolol 25 MG tablet  Commonly known as:  TENORMIN  Take 25 mg by mouth 2 (two) times daily.     BENICAR 20 MG tablet  Generic drug:  olmesartan  Take 20 mg by mouth every morning.     CO Q-10 ORAL  Take 1 tablet by mouth daily as needed.     CRESTOR 20 MG tablet  Generic drug:  rosuvastatin     hydroCHLOROthiazide 12.5 mg capsule  Commonly known as:  MICROZIDE  Take 12.5 mg by mouth every morning.        STOP taking these medications    naproxen sodium 220 MG tablet  Commonly known as:  ANAPROX        ASK your doctor about these medications    acetaminophen 500 MG tablet  Commonly known as:  TYLENOL  Take 2 tablets (1,000 mg total) by mouth every 8 (eight) hours as needed for Pain.            Yennifer Chatterjee PA-C  Neurosurgery  [facility]

## 2018-09-13 NOTE — ASSESSMENT & PLAN NOTE
68 y.o. male with history of HTN, HLD, has pacemaker on asa 81mg, and previous L4-S1 laminotomy who has worsening low back pain and RLE pain. He is s/p Posterolateral L4-S1 fusion with Dr. An, POD#3    -Patient is neurologically stable.  -Post op imaging stable.   -Pain improved today after med adjustments 9/12/18  -Bacitracin to incision BID   -LSO when OOB or with activity  -PT/OT/OOB daily  -Patient must be OOB for atleast 6 hours daily (may be in intervals: 2 hours in chair with each meal)  -IS to bed side. Patient to use atleast 10x every hour.  -Continue bowel regimen daily. BM 9/12 after suppository. No acute abdomen findings on exam.    -Continue SQH, Teds and SCDs for DVTP.  -Restarted all home meds for  HTN and HLD. Continue home ASA 81mg.  -therapy recommending HH with bedside commode and rolling walker but patient is refusing HH and bedside commode. He will consider therapy after 2 wk post op visit.   -Voiding appropriately .   -leukocytosis, resolved 8.75 today.   -Chronic hyponatremia: 131 on admission but today 128. Continue salt tabs to 2g BID. Patient will follow up with PCP outpatient.     Dispo: DC home. Patient has 2 wk wound check with NSR nurse and 6 week follow up with Dr. An.     Discussed with Dr. An

## 2018-09-13 NOTE — DISCHARGE INSTRUCTIONS
Please follow ONLY the instructions that are checked below.    Activity Restrictions:  [x]  Return to work will be determined on an individual basis.  [x]  No lifting greater than 10 pounds.  [x]  Avoid bending and twisting the area of your surgery more than 45 degrees from neutral position in any direction.  [x]  No driving or operating machinery:  [x]  until cleared by your surgeon.  [x]  while taking narcotic pain medications or muscle relaxants.  []  No cervical collar, soft collar, or lumbar brace required.  [x]  Wear your brace at all times. You may be given an extra brace or soft collar to wear when showering.  []  Wear your brace at all times except when flat in bed.  []  Wear brace for comfort only.  [x]  Increase ambulation over the next 2 weeks so that you are walking 2 miles per day at 2 weeks post-operatively.  [x]  Walk on paved surfaces only. It is okay to walk up and down stairs while holding onto a side rail.  [x]  No sexual activity for 2-3 weeks.    Discharge Medication/Follow-up:  [x]  Please refer to discharge medication reconciliation form.  [x]  Do not take ANY non-steroidal anti-inflammatory drugs (NSAIDS), including the following: ibuprofen, naprosyn, Aleve, Advil, Indocin, Mobic, or Celebrex for:  []  4 weeks  [x]  8 weeks  []  6 months  [x]  Prescriptions for appropriate medication will be given upon discharge.  [x]  Take docusate (Colace 100 mg): take one capsule a day as needed for constipation. You can get this over the counter.  [x]  Follow-up appointment:  [x]  10-14 days post-op for wound check by physician assistant/nurse- Valeria Block PA-C 9/24/18  [x]  4-6 weeks with MD: Dr. An 11/6/18  [x]  with x-rays  []  without x-rays  [x]  During your hospital stay, your sodium levels were low. Make an appointment with your primary care doctor within 2 weeks for work up of hyponatremia.    Wound Care:  []  Remove dressing or bandaid in    days.  [x]  No bandage required. Keep your  incision open to the air.  [x]  You may shower on the 2nd day after your surgery. Have the force of water hit you opposite from the incision. Pat the incision dry after your shower; do not scrub the incision.  [x]  You cannot take a bath until 8 weeks after surgery.  [x]  Apply bacitracin to incision twice a day     Call your doctor or go to the Emergency Room for any signs of infection, including: increased redness, drainage, pain, or fever (temperature ?101.5 for 24 hours). Call your doctor or go to the Emergency Room if there are any localized neurological changes; problems with speech, vision, numbness, tingling, weakness, or severe headache; or for other concerns.    Special Instructions:  [x]  No use of tobacco products.  [x]  Diet: Please eat a regular diet as tolerated.  []  Other diet:              Specific physician instructions:           Physicians need 3 days' notice for pain medicine to be refilled. Pain medicine will only be refilled between 8 AM and 5 PM, Monday through Friday, due to Food and Drug Administration regulation of documentation.    If you have any questions about this form, please call [phone].    Form No. 52389 (Revised 10/31/2013)

## 2018-09-13 NOTE — HOSPITAL COURSE
9/10: L3-4, L4-5 XLIF and L5-S1 TLIF  9/11: Reports continued pain, but previous RLE pain now resolved. He is tolerating diet and voiding appropriately. PT/OT rec HH.  9/12: Worsening of LLE pain and current regimen not helping. Pain meds adjusted. Constipation since Sunday but had large BM today after suppository. Hyponatremia at baseline, today Na 128. Started salt tabs.  9/13: Pt reports LLE pain is improved. Na 128 today. Will dc on salt tabs. He is refusing HH or outpatient therapy referral.

## 2018-09-13 NOTE — PLAN OF CARE
Problem: Patient Care Overview  Goal: Plan of Care Review  Outcome: Ongoing (interventions implemented as appropriate)  No acute events or changes overnight. Patient medicated w/PRN Valium once for muscle spasm and pain w/significant relief. No other complaints throughout the shift. Patient likely to be D/C later today pending lab values.     Problem: Pain, Chronic (Adult)  Goal: Acceptable Pain Control/Comfort Level  Patient will demonstrate the desired outcomes by discharge/transition of care.  Outcome: Ongoing (interventions implemented as appropriate)  One PRN administration needed for pain control.

## 2018-09-13 NOTE — PLAN OF CARE
Patient to be discharged home.  The patient recommendation for discharge by PT/OT was home health but the patient refuses.  The patient also refused a bedside commode but was provided with a rolling walker.  Family to provide transportation home.  Neurosurgery clinic to schedule follow up appointment.    Future Appointments   Date Time Provider Department Center   9/24/2018  8:00 AM Valeria Block PA-C [facility]   11/6/2018  1:45 PM [facility]   11/6/2018  2:30 PM Larry An MD [facility]        09/13/18 1427   Final Note   Assessment Type Final Discharge Note   Discharge Disposition Home   Hospital Follow Up  Appt(s) scheduled? (Neurosurgery clinic to schedule follow up appointment.)   Discharge plans and expectations educations in teach back method with documentation complete? Yes

## 2018-09-14 LAB
BLD PROD TYP BPU: NORMAL
BLD PROD TYP BPU: NORMAL
BLOOD UNIT EXPIRATION DATE: NORMAL
BLOOD UNIT EXPIRATION DATE: NORMAL
BLOOD UNIT TYPE CODE: 5100
BLOOD UNIT TYPE CODE: 5100
BLOOD UNIT TYPE: NORMAL
BLOOD UNIT TYPE: NORMAL
CODING SYSTEM: NORMAL
CODING SYSTEM: NORMAL
DISPENSE STATUS: NORMAL
DISPENSE STATUS: NORMAL
TRANS ERYTHROCYTES VOL PATIENT: NORMAL ML
TRANS ERYTHROCYTES VOL PATIENT: NORMAL ML

## 2018-09-14 NOTE — PT/OT/SLP DISCHARGE
Occupational Therapy Discharge Summary    Jarrett Blanco  MRN: [id number]   Principal Problem: Lumbar stenosis with neurogenic claudication      Patient Discharged from acute Occupational Therapy on 9/13/18.  Please refer to prior OT note dated 9/11/18 for functional status.    Assessment:      Patient appropriate for care in another setting.    Objective:     GOALS:   Multidisciplinary Problems     Occupational Therapy Goals     Not on file          Multidisciplinary Problems (Resolved)        Problem: Occupational Therapy Goal    Goal Priority Disciplines Outcome Interventions   Occupational Therapy Goal   (Resolved)     OT, PT/OT Outcome(s) achieved    Description:  Goals to be met by: 7 days (9/18/18)     Patient will increase functional independence with ADLs by performing:    UE Dressing with Supervision including LSO.  LE Dressing with Minimal Assistance using AD as needed.  Grooming while standing at sink with Stand-by Assistance.  Toileting from toilet with Stand-by Assistance for hygiene and clothing management.   Supine to sit with Stand-by Assistance.  Toilet transfer to toilet with Stand-by Assistance.  Pt will complete functional mobility household distance with supervision using AD as needed.                      Reasons for Discontinuation of Therapy Services  Transfer to alternate level of care.      Plan:     Patient Discharged to: Home; refused HH therapy    VINOD Mena  9/14/2018

## 2018-09-15 ENCOUNTER — PATIENT MESSAGE (OUTPATIENT)
Dept: FAMILY MEDICINE | Facility: CLINIC | Age: 68
End: 2018-09-15

## 2018-09-15 DIAGNOSIS — E87.1 HYPONATREMIA: Primary | ICD-10-CM

## 2018-09-19 ENCOUNTER — LAB VISIT (OUTPATIENT)
Dept: LAB | Facility: HOSPITAL | Age: 68
End: 2018-09-19
Attending: FAMILY MEDICINE
Payer: MEDICARE

## 2018-09-19 DIAGNOSIS — E87.1 HYPONATREMIA: ICD-10-CM

## 2018-09-19 LAB
ANION GAP SERPL CALC-SCNC: 7 MMOL/L
BUN SERPL-MCNC: 11 MG/DL
CALCIUM SERPL-MCNC: 9.3 MG/DL
CHLORIDE SERPL-SCNC: 94 MMOL/L
CO2 SERPL-SCNC: 30 MMOL/L
CREAT SERPL-MCNC: 0.9 MG/DL
EST. GFR  (AFRICAN AMERICAN): >60 ML/MIN/1.73 M^2
EST. GFR  (NON AFRICAN AMERICAN): >60 ML/MIN/1.73 M^2
GLUCOSE SERPL-MCNC: 75 MG/DL
POTASSIUM SERPL-SCNC: 4.7 MMOL/L
SODIUM SERPL-SCNC: 131 MMOL/L

## 2018-09-19 PROCEDURE — 36415 COLL VENOUS BLD VENIPUNCTURE: CPT | Mod: PO

## 2018-09-19 PROCEDURE — 80048 BASIC METABOLIC PNL TOTAL CA: CPT

## 2018-09-19 NOTE — TELEPHONE ENCOUNTER
I spoke with patient. He is coming in today for a BMP to recheck his sodium. Also I need to recheck him on Tuesday 9/25/18 at 1:30. He will be the only patient at that time. Thanks!

## 2018-09-23 NOTE — PROGRESS NOTES
Subjective:      Patient ID: Jarrett Blanco is a 68 y.o. male.    Chief Complaint: Low-back Pain      HPI  (Juve)    He is here for his 2 week postop wound check. He is s/p Left L3-4 and L4-5 XLIF and MIS TLIF L5-S1 with L3-S1 perc instrumentation by Dr. An on 9/10/18.     He is doing well since surgery. He has seen improvement in his preop back and right leg pain. Only with intermittent minimal right lateral thigh pain. He continues to wear his brace and walk during the day. Biggest complaint is throbbing left leg pain at night from his knee down. Was having this in the hospital and was told it was due to spasms. He is taking oxycodone 10mg at night only. He takes tylenol in the morning. He is not taking valium.       Review of Systems   Constitution: Negative for chills, fever, night sweats and weight gain.   Gastrointestinal: Negative for bowel incontinence, nausea and vomiting.   Genitourinary: Negative for bladder incontinence.   Neurological: Negative for disturbances in coordination and loss of balance.           Objective:        General: Jarrett is well-developed, well-nourished, appears stated age, in no acute distress, alert and oriented to time, place and person.     Ortho/SPM Exam     On exam of patient's lumbar spine and left lateral flank, the incisions are clean, dry and intact. No surrounding erythema, fluctuance, or signs of infection noted. ROM not tested due to being postop.    Strength testing of the bilateral LEs shows  Right hip abduction:  +5/5  Left hip abduction:  +5/5  Right hip flexion:  5/5   Left hip flexion:  5/5  Right hip extensors:  +5/5  Left hip extensors:  +5/5  Right quadriceps:  +5/5  Left quadriceps:  +5/5  Right hamstring:  +5/5  Left hamstring:  +5/5  Right dorsiflexion:  +5/5  Left dorsiflexion:  +5/5  Right plantar flexion:  +5/5  Left plantar flexion:  +5/5   Right EHL:  +5/5   Left EHL:  +5/5    Sensation grossly intact to bilateral LEs as well.     Calf is  supple and nontender bilaterally.         Assessment:       1. S/P lumbar spinal fusion           Plan:            He is doing well s/p above surgery. Left leg pain/spasms should continue to improve with time. His preop LBP and right leg pain is improving. Following plan made:     - Given handicapped placard.   - Continue with walking. Continue with brace until f/u.   - Can drive when off his pain medication.   - May want to try restarting valium 1-2 q hs for spasms at night.   - Reviewed wound care.     Follow-up: Follow-up in about 6 weeks (around 11/6/2018). If there are any questions prior to this, the patient was instructed to contact the office.

## 2018-09-24 ENCOUNTER — TELEPHONE (OUTPATIENT)
Dept: SPINE | Facility: CLINIC | Age: 68
End: 2018-09-24

## 2018-09-25 ENCOUNTER — LAB VISIT (OUTPATIENT)
Dept: LAB | Facility: HOSPITAL | Age: 68
End: 2018-09-25
Attending: FAMILY MEDICINE
Payer: MEDICARE

## 2018-09-25 ENCOUNTER — OFFICE VISIT (OUTPATIENT)
Dept: SPINE | Facility: CLINIC | Age: 68
End: 2018-09-25
Payer: MEDICARE

## 2018-09-25 ENCOUNTER — OFFICE VISIT (OUTPATIENT)
Dept: FAMILY MEDICINE | Facility: CLINIC | Age: 68
End: 2018-09-25
Payer: MEDICARE

## 2018-09-25 VITALS
HEIGHT: 73 IN | WEIGHT: 315 LBS | SYSTOLIC BLOOD PRESSURE: 117 MMHG | HEART RATE: 70 BPM | BODY MASS INDEX: 41.75 KG/M2 | DIASTOLIC BLOOD PRESSURE: 67 MMHG

## 2018-09-25 VITALS
HEIGHT: 73 IN | OXYGEN SATURATION: 97 % | BODY MASS INDEX: 41.75 KG/M2 | HEART RATE: 79 BPM | SYSTOLIC BLOOD PRESSURE: 126 MMHG | DIASTOLIC BLOOD PRESSURE: 65 MMHG | WEIGHT: 315 LBS

## 2018-09-25 DIAGNOSIS — Z98.1 S/P LUMBAR SPINAL FUSION: Primary | ICD-10-CM

## 2018-09-25 DIAGNOSIS — M48.062 LUMBAR STENOSIS WITH NEUROGENIC CLAUDICATION: ICD-10-CM

## 2018-09-25 DIAGNOSIS — E87.1 HYPONATREMIA: ICD-10-CM

## 2018-09-25 DIAGNOSIS — E87.1 HYPONATREMIA: Primary | ICD-10-CM

## 2018-09-25 DIAGNOSIS — I10 ESSENTIAL HYPERTENSION, BENIGN: ICD-10-CM

## 2018-09-25 LAB
ANION GAP SERPL CALC-SCNC: 7 MMOL/L
BUN SERPL-MCNC: 14 MG/DL
CALCIUM SERPL-MCNC: 9.9 MG/DL
CHLORIDE SERPL-SCNC: 94 MMOL/L
CO2 SERPL-SCNC: 28 MMOL/L
CREAT SERPL-MCNC: 1 MG/DL
EST. GFR  (AFRICAN AMERICAN): >60 ML/MIN/1.73 M^2
EST. GFR  (NON AFRICAN AMERICAN): >60 ML/MIN/1.73 M^2
GLUCOSE SERPL-MCNC: 108 MG/DL
POTASSIUM SERPL-SCNC: 4.6 MMOL/L
SODIUM SERPL-SCNC: 129 MMOL/L

## 2018-09-25 PROCEDURE — 99999 PR PBB SHADOW E&M-EST. PATIENT-LVL III: CPT | Mod: PBBFAC,,, | Performed by: FAMILY MEDICINE

## 2018-09-25 PROCEDURE — 36415 COLL VENOUS BLD VENIPUNCTURE: CPT | Mod: PO

## 2018-09-25 PROCEDURE — 99999 PR PBB SHADOW E&M-EST. PATIENT-LVL III: CPT | Mod: PBBFAC,,, | Performed by: PHYSICIAN ASSISTANT

## 2018-09-25 PROCEDURE — 99213 OFFICE O/P EST LOW 20 MIN: CPT | Mod: S$PBB,,, | Performed by: FAMILY MEDICINE

## 2018-09-25 PROCEDURE — 99213 OFFICE O/P EST LOW 20 MIN: CPT | Mod: PBBFAC,27,PO | Performed by: FAMILY MEDICINE

## 2018-09-25 PROCEDURE — 1101F PT FALLS ASSESS-DOCD LE1/YR: CPT | Mod: CPTII,,, | Performed by: FAMILY MEDICINE

## 2018-09-25 PROCEDURE — 3074F SYST BP LT 130 MM HG: CPT | Mod: CPTII,,, | Performed by: FAMILY MEDICINE

## 2018-09-25 PROCEDURE — 99024 POSTOP FOLLOW-UP VISIT: CPT | Mod: ,,, | Performed by: PHYSICIAN ASSISTANT

## 2018-09-25 PROCEDURE — 99213 OFFICE O/P EST LOW 20 MIN: CPT | Mod: PBBFAC | Performed by: PHYSICIAN ASSISTANT

## 2018-09-25 PROCEDURE — 80048 BASIC METABOLIC PNL TOTAL CA: CPT

## 2018-09-25 PROCEDURE — 3078F DIAST BP <80 MM HG: CPT | Mod: CPTII,,, | Performed by: FAMILY MEDICINE

## 2018-09-25 NOTE — PT/OT/SLP DISCHARGE
Physical Therapy Discharge Summary    Name: Jarrett Blanco  MRN: [id number]   Principal Problem: Lumbar stenosis with neurogenic claudication     Patient Discharged from acute Physical Therapy on 18     Assessment:     Patient appropriate for care in another setting.    Objective:     GOALS:   Multidisciplinary Problems     Physical Therapy Goals     Not on file          Multidisciplinary Problems (Resolved)        Problem: Physical Therapy Goal    Goal Priority Disciplines Outcome Goal Variances Interventions   Physical Therapy Goal   (Resolved)     PT, PT/OT Outcome(s) achieved     Description:  Goals to be met by: 18    Patient will increase functional independence with mobility by performin. Supine to sit with Set-up [place]  2. Sit to supine with Set-up [place]  3. Sit to stand transfer with Stand-by Assistance  4. Bed to chair transfer with Stand-by Assistance using Rolling Walker  5. Gait  x 140 feet with Supervision using Rolling Walker.                       Reasons for Discontinuation of Therapy Services  Transfer to alternate level of care.      Plan:     Patient Discharged to: Home; declined HHPT services    Luis Alfredo Claudio III, PT  2018

## 2018-09-25 NOTE — PROGRESS NOTES
Subjective:       Patient ID: Jarrett Blanco is a 68 y.o. male.    Chief Complaint: Hyponatremia    Patient presents here for follow-up of hyponatremia.  The patient underwent spinal surgery at the [facility] several weeks ago.  During his hospitalization he had persistent hyponatremia which was getting better at the time he was discharged.  His sodium at discharge about 10 days ago was 128.  I did recheck it as an outpatient on 09/19/2018 and was up to 131.  The patient did have some mild edema during the hospitalization but this has pretty much resolved.  He is back on his present blood pressure medications and his blood pressure is controlled.  He states that he was trying to drink a lot a water but now he is drinking only to satisfy his thirst and is not only drinking water, but also drinking Gatorade.  He saw his spine surgeon this morning and his note was reviewed.  He is using his oxycodone only as necessary for pain and states he really does not take it every day.  His main complaint is some pain in the left leg which he had in the hospitalization which they thought was muscle spasm.  He was given Valium to take at night but he does not like taking it, and has not been using it.  The neurosurgeon urgent him to use the Valium nightly temporarily until the spasm gets better, and I have urged him to do the same.  As soon as it is better he can stop the medication.      Review of Systems   Constitutional: Positive for activity change (Due to recent surgery). Negative for chills and fever.   HENT: Negative for congestion and sore throat.    Respiratory: Negative for cough and shortness of breath.    Cardiovascular: Positive for leg swelling ( minimal edema in the ankles). Negative for chest pain and palpitations.   Gastrointestinal: Negative for abdominal pain, constipation, diarrhea, nausea and vomiting.   Genitourinary: Negative for difficulty urinating, flank pain and frequency.    Musculoskeletal: Positive for back pain ( secondary to his back surgery but the original pain is improving).        Positive for muscle spasm in the left leg   Neurological: Negative for dizziness and headaches.       Objective:      Physical Exam   Constitutional: He is oriented to person, place, and time.   Cardiovascular: Normal rate, regular rhythm, normal heart sounds and intact distal pulses.   No murmur heard.  Pulmonary/Chest: Effort normal and breath sounds normal. He has no wheezes. He has no rales.   Musculoskeletal: He exhibits edema (Trace edema in his ankles bilaterally).   Patient has a back brace in place and this is not removed.  He was just seen by his neurosurgeon several hours ago.   Neurological: He is alert and oriented to person, place, and time. No cranial nerve deficit.   Vitals reviewed.      Assessment:       1. Hyponatremia    2. Lumbar stenosis with neurogenic claudication    3. Essential hypertension, benign        Plan:       1.  BMP today to recheck his sodium as well as his potassium and renal function  2.  Continue present medication for his hypertension is this is well controlled  3.  Continue to use the pain medication and Valium only as necessary and increase activity as per Neurosurgery

## 2018-09-27 DIAGNOSIS — E87.1 HYPONATREMIA: Primary | ICD-10-CM

## 2018-10-05 ENCOUNTER — LAB VISIT (OUTPATIENT)
Dept: LAB | Facility: HOSPITAL | Age: 68
End: 2018-10-05
Attending: FAMILY MEDICINE
Payer: MEDICARE

## 2018-10-05 DIAGNOSIS — E87.1 HYPONATREMIA: ICD-10-CM

## 2018-10-05 DIAGNOSIS — Z11.59 NEED FOR HEPATITIS C SCREENING TEST: ICD-10-CM

## 2018-10-05 LAB
ANION GAP SERPL CALC-SCNC: 8 MMOL/L
BUN SERPL-MCNC: 9 MG/DL
CALCIUM SERPL-MCNC: 10 MG/DL
CHLORIDE SERPL-SCNC: 98 MMOL/L
CO2 SERPL-SCNC: 29 MMOL/L
CREAT SERPL-MCNC: 0.9 MG/DL
EST. GFR  (AFRICAN AMERICAN): >60 ML/MIN/1.73 M^2
EST. GFR  (NON AFRICAN AMERICAN): >60 ML/MIN/1.73 M^2
GLUCOSE SERPL-MCNC: 106 MG/DL
POTASSIUM SERPL-SCNC: 4.9 MMOL/L
SODIUM SERPL-SCNC: 135 MMOL/L

## 2018-10-05 PROCEDURE — 86803 HEPATITIS C AB TEST: CPT

## 2018-10-05 PROCEDURE — 36415 COLL VENOUS BLD VENIPUNCTURE: CPT | Mod: PO

## 2018-10-05 PROCEDURE — 80048 BASIC METABOLIC PNL TOTAL CA: CPT

## 2018-10-08 LAB — HCV AB SERPL QL IA: NEGATIVE

## 2018-11-06 ENCOUNTER — OFFICE VISIT (OUTPATIENT)
Dept: SPINE | Facility: CLINIC | Age: 68
End: 2018-11-06
Attending: NEUROLOGICAL SURGERY
Payer: MEDICARE

## 2018-11-06 ENCOUNTER — HOSPITAL ENCOUNTER (OUTPATIENT)
Dept: RADIOLOGY | Facility: OTHER | Age: 68
Discharge: HOME | End: 2018-11-06
Attending: NEUROLOGICAL SURGERY
Payer: MEDICARE

## 2018-11-06 VITALS
DIASTOLIC BLOOD PRESSURE: 86 MMHG | HEIGHT: 73 IN | HEART RATE: 76 BPM | BODY MASS INDEX: 41.75 KG/M2 | SYSTOLIC BLOOD PRESSURE: 125 MMHG | WEIGHT: 315 LBS

## 2018-11-06 DIAGNOSIS — Z98.1 S/P LUMBAR SPINAL FUSION: ICD-10-CM

## 2018-11-06 PROCEDURE — 72100 X-RAY EXAM L-S SPINE 2/3 VWS: CPT | Mod: 26,HCWC,, | Performed by: RADIOLOGY

## 2018-11-06 PROCEDURE — 99024 POSTOP FOLLOW-UP VISIT: CPT | Mod: HCWC,S$GLB,, | Performed by: NEUROLOGICAL SURGERY

## 2018-11-06 PROCEDURE — 72100 X-RAY EXAM L-S SPINE 2/3 VWS: CPT | Mod: TC,FY,HCWC

## 2018-11-06 PROCEDURE — 99999 PR PBB SHADOW E&M-EST. PATIENT-LVL III: CPT | Mod: PBBFAC,HCWC,, | Performed by: NEUROLOGICAL SURGERY

## 2018-11-06 NOTE — PROGRESS NOTES
CHIEF COMPLAINT:    4-6 week follow-up    HPI:    Jarrett Blanco is a 68 y.o.-year-old male who presents today for post-operative follow-up. He is s/p L3-4, L4-5 XLIF, L5-S1 MISTLIF that was done by Dr. An on 09/10/2018. Currently, the patient's symptoms are better since surgery. The patient is complaining of pain in the lower left leg moving into the left foot with associated paresthesias into the foot. He denies any new onset of weakness. The patient describes the pain as similar to shin splints. The patient rates the pain 2 on the pain scale. The pain is worse with nothing and better with nothing. Post-op medications the patient is currently taking are: none.    ROS:    NAD    PE:    AAOX3  PERRL  EOMI    Lumbar incision:  C/D/I    ROM:   Decreased secondary to pain    Sensation:  Intact to light touch (All 4 extremities)    Strength: Full strength      Deltoids Biceps Triceps Wrist Ext. Wrist Flex. Hand    RUE         LUE          Hip Flex. Knee Flex. Knee Ext. Dorsi Flex Plantar Flex EHL   RLE         LLE           Gait:  normal    DTR:  2+ and symmetric bilaterally    No abnormal reflexes    SLR- negative    IMAGING:    XRays: Lspine: Good placement of hardware and spacers.     CT: none    MRI: none    ASSESSMENT:   Doing very well post op    PLAN:   Follow up 6 months with Lspine CT to assess progression of spinal fusion.

## 2018-11-08 PROBLEM — Z98.1 S/P LUMBAR SPINAL FUSION: Status: ACTIVE | Noted: 2018-11-08

## 2019-05-13 ENCOUNTER — PATIENT OUTREACH (OUTPATIENT)
Dept: ADMINISTRATIVE | Facility: HOSPITAL | Age: 69
End: 2019-05-13

## 2019-05-13 DIAGNOSIS — E78.5 HYPERLIPIDEMIA, UNSPECIFIED HYPERLIPIDEMIA TYPE: Primary | ICD-10-CM

## 2019-10-30 ENCOUNTER — OFFICE VISIT (OUTPATIENT)
Dept: SPINE | Facility: CLINIC | Age: 69
End: 2019-10-30
Payer: MEDICARE

## 2019-10-30 VITALS — HEIGHT: 73 IN | BODY MASS INDEX: 41.75 KG/M2 | WEIGHT: 315 LBS

## 2019-10-30 DIAGNOSIS — M54.9 BACK PAIN, UNSPECIFIED BACK LOCATION, UNSPECIFIED BACK PAIN LATERALITY, UNSPECIFIED CHRONICITY: Primary | ICD-10-CM

## 2019-10-30 PROCEDURE — 99204 OFFICE O/P NEW MOD 45 MIN: CPT | Mod: S$GLB,,, | Performed by: PHYSICAL MEDICINE & REHABILITATION

## 2019-10-30 PROCEDURE — 1101F PT FALLS ASSESS-DOCD LE1/YR: CPT | Mod: S$GLB,,, | Performed by: PHYSICAL MEDICINE & REHABILITATION

## 2019-10-30 PROCEDURE — 1101F PR PT FALLS ASSESS DOC 0-1 FALLS W/OUT INJ PAST YR: ICD-10-PCS | Mod: S$GLB,,, | Performed by: PHYSICAL MEDICINE & REHABILITATION

## 2019-10-30 PROCEDURE — 99204 PR OFFICE/OUTPT VISIT, NEW, LEVL IV, 45-59 MIN: ICD-10-PCS | Mod: S$GLB,,, | Performed by: PHYSICAL MEDICINE & REHABILITATION

## 2019-10-30 RX ORDER — IBUPROFEN 200 MG
200 TABLET ORAL EVERY 6 HOURS PRN
COMMUNITY

## 2019-10-30 NOTE — PROGRESS NOTES
SUBJECTIVE:    Patient ID: Jarrett Blanco is a 69 y.o. male.    Chief Complaint: Back Pain    This is a 69-year-old man who sees Dr. Sands for his primary care. Has history of coronary artery disease hypertension and has a pacemaker.  History of renal cancer status post nephrectomy.  Long history of low back pain.  Has had 3 lumbar surgeries the last 1 was on 09/10/2018 he had a fusion L3 through S1.  L3-L4, L4-5 XLIF and L5-D4MCVSLIG.  That surgery was done by  who is no longer in in the country.  Patient was last seen by his neurosurgeon in November of last year.  The x-rays were considered stable and the patient was to have a CT to follow-up on the state of his fusion.  Patient says that he did have that study performed but I do not see any record of it in the chart.  He presents to me with a 3 4-4 month history of worsening low back pain at the lumbosacral junction and bilateral leg weakness.  Weakness is not progressive and in fact he says it was present prior to the surgery.  In particular he has difficulty with hip flexion on both sides.  He admits to a sedentary lifestyle.  He has not been doing any exercises to work on his strength.  He is not having any david radicular symptoms.  No bowel or bladder dysfunction fever chills sweats or unexpected weight loss.  Current pain level is 6/10.  He uses Aleve and Advil with some relief.        Past Medical History:   Diagnosis Date    Cancer     Kidney - right nephrectomy    Coronary artery disease     Hypertension     Pacemaker      Social History     Socioeconomic History    Marital status:      Spouse name: Not on file    Number of children: Not on file    Years of education: Not on file    Highest education level: Not on file   Occupational History    Not on file   Social Needs    Financial resource strain: Not on file    Food insecurity:     Worry: Not on file     Inability: Not on file    Transportation needs:     Medical:  "Not on file     Non-medical: Not on file   Tobacco Use    Smoking status: Never Smoker    Smokeless tobacco: Current User   Substance and Sexual Activity    Alcohol use: Yes     Comment: beer most days    Drug use: Not on file    Sexual activity: Not on file   Lifestyle    Physical activity:     Days per week: Not on file     Minutes per session: Not on file    Stress: Not on file   Relationships    Social connections:     Talks on phone: Not on file     Gets together: Not on file     Attends Yarsanism service: Not on file     Active member of club or organization: Not on file     Attends meetings of clubs or organizations: Not on file     Relationship status: Not on file   Other Topics Concern    Not on file   Social History Narrative    Not on file     Past Surgical History:   Procedure Laterality Date    BONE MARROW ASPIRATION N/A 9/10/2018    Procedure: ASPIRATION, BONE MARROW;  Surgeon: Larry An MD;  Location: [facility] OR [address] [place] [address];  Service: Neurosurgery;  Laterality: N/A;    CARDIAC PACEMAKER PLACEMENT      EXTREME LATERAL INTERBODY FUSION (XLIF) OF SPINE Left 9/10/2018    Procedure: Left L3-4 and L4-5 XLIF and MIS TLIF L5-S1 with L3-S1 perc instrumentation;  Surgeon: Larry An MD;  Location: [facility] OR [address] [place] [address];  Service: Neurosurgery;  Laterality: Left;  Length: 4-6 hours  Stay: 2-3 days  Brace: LSO  NeuroMonitoring: EMG, SEP  Bed: 1) Regular slider bed reversed 2) lavonne 4 post  Position: 1) Lateral right side down 2) Prone  Vendor: [employer] (Merly Abbasi)    LAMINECTOMY  4/22/15    L4-S1 laminoforaminotomy    NEPHRECTOMY Right     SPINE SURGERY  1975    lumbar     Family History   Problem Relation Age of Onset    COPD Mother     Cancer Mother     Cancer Father      Vitals:    10/30/19 1350   Weight: (!) 144.2 kg (317 lb 14.5 oz)   Height: 6' 1" (1.854 m)       Review of Systems   Constitutional: Negative for chills, diaphoresis, fatigue, fever and unexpected weight change.   HENT: " Negative for trouble swallowing.    Eyes: Negative for visual disturbance.   Respiratory: Negative for shortness of breath.    Cardiovascular: Negative for chest pain.   Gastrointestinal: Negative for abdominal pain, constipation, diarrhea, nausea and vomiting.   Genitourinary: Negative for difficulty urinating.   Musculoskeletal: Negative for arthralgias, back pain, gait problem, joint swelling, myalgias, neck pain and neck stiffness.   Neurological: Negative for dizziness, speech difficulty, weakness, light-headedness, numbness and headaches.          Objective:      Physical Exam   Constitutional: He is oriented to person, place, and time. He appears well-developed and well-nourished.   Neurological: He is alert and oriented to person, place, and time.   He is awake and in no acute distress  Mild tenderness to palpation in the lumbar paraspinous musculature.  No external lesions or palpable masses  Forward flexion is to about 60° before complains of pain at the lumbosacral junction.  Extension at 10° causes mild pain at the lumbosacral junction  He defers heel and toe walking  Deep tendon reflexes +1 at both knees and both ankles  Strength is normal in both lower extremities except 4+ over 5 strength in both hip flexors  Straight leg raising is negative bilateral           Assessment:       1. Back pain, unspecified back location, unspecified back pain laterality, unspecified chronicity           Plan:     he has a nonfocal examination from a neurological standpoint and no historical red flags.  I will get a CT of the lumbar spine to assess the state of his fusion and also to look at the L2-3 level.  The I suspect that his leg weakness is primarily secondary to deconditioning.  We may consider EMG and nerve conduction studies.  Consider physical therapy      Back pain, unspecified back location, unspecified back pain laterality, unspecified chronicity  -     CT Lumbar Spine Without Contrast; Future; Expected  date: 10/30/2019

## 2019-11-04 ENCOUNTER — HOSPITAL ENCOUNTER (OUTPATIENT)
Dept: RADIOLOGY | Facility: HOSPITAL | Age: 69
Discharge: HOME | End: 2019-11-04
Attending: PHYSICAL MEDICINE & REHABILITATION
Payer: MEDICARE

## 2019-11-04 DIAGNOSIS — M54.9 BACK PAIN, UNSPECIFIED BACK LOCATION, UNSPECIFIED BACK PAIN LATERALITY, UNSPECIFIED CHRONICITY: ICD-10-CM

## 2019-11-04 PROCEDURE — 72131 CT LUMBAR SPINE WITHOUT CONTRAST: ICD-10-PCS | Mod: 26,HCNC,, | Performed by: RADIOLOGY

## 2019-11-04 PROCEDURE — 72131 CT LUMBAR SPINE W/O DYE: CPT | Mod: TC,HCNC

## 2019-11-04 PROCEDURE — 72131 CT LUMBAR SPINE W/O DYE: CPT | Mod: 26,HCNC,, | Performed by: RADIOLOGY

## 2019-11-06 ENCOUNTER — OFFICE VISIT (OUTPATIENT)
Dept: SPINE | Facility: CLINIC | Age: 69
End: 2019-11-06
Payer: MEDICARE

## 2019-11-06 VITALS
HEART RATE: 66 BPM | WEIGHT: 315 LBS | SYSTOLIC BLOOD PRESSURE: 158 MMHG | BODY MASS INDEX: 41.75 KG/M2 | DIASTOLIC BLOOD PRESSURE: 88 MMHG | HEIGHT: 73 IN

## 2019-11-06 DIAGNOSIS — M54.9 BACK PAIN, UNSPECIFIED BACK LOCATION, UNSPECIFIED BACK PAIN LATERALITY, UNSPECIFIED CHRONICITY: Primary | ICD-10-CM

## 2019-11-06 PROCEDURE — 1101F PR PT FALLS ASSESS DOC 0-1 FALLS W/OUT INJ PAST YR: ICD-10-PCS | Mod: S$GLB,,, | Performed by: PHYSICAL MEDICINE & REHABILITATION

## 2019-11-06 PROCEDURE — 3079F PR MOST RECENT DIASTOLIC BLOOD PRESSURE 80-89 MM HG: ICD-10-PCS | Mod: S$GLB,,, | Performed by: PHYSICAL MEDICINE & REHABILITATION

## 2019-11-06 PROCEDURE — 3077F SYST BP >= 140 MM HG: CPT | Mod: S$GLB,,, | Performed by: PHYSICAL MEDICINE & REHABILITATION

## 2019-11-06 PROCEDURE — 99213 OFFICE O/P EST LOW 20 MIN: CPT | Mod: S$GLB,,, | Performed by: PHYSICAL MEDICINE & REHABILITATION

## 2019-11-06 PROCEDURE — 3079F DIAST BP 80-89 MM HG: CPT | Mod: S$GLB,,, | Performed by: PHYSICAL MEDICINE & REHABILITATION

## 2019-11-06 PROCEDURE — 99213 PR OFFICE/OUTPT VISIT, EST, LEVL III, 20-29 MIN: ICD-10-PCS | Mod: S$GLB,,, | Performed by: PHYSICAL MEDICINE & REHABILITATION

## 2019-11-06 PROCEDURE — 3077F PR MOST RECENT SYSTOLIC BLOOD PRESSURE >= 140 MM HG: ICD-10-PCS | Mod: S$GLB,,, | Performed by: PHYSICAL MEDICINE & REHABILITATION

## 2019-11-06 PROCEDURE — 1101F PT FALLS ASSESS-DOCD LE1/YR: CPT | Mod: S$GLB,,, | Performed by: PHYSICAL MEDICINE & REHABILITATION

## 2019-11-06 NOTE — PROGRESS NOTES
SUBJECTIVE:    Patient ID: Jarrett Blanco is a 69 y.o. male.    Chief Complaint: Follow-up (CT results)    He is here to review his lumbar CT which was done on 11/04/2019 to evaluate status of his lumbar fusion little over 1 year postop and to evaluate his complaints of bilateral hip flexor weakness and pain in the anterior thighs.  The CT is summarized below:    Impression      1. There are degenerative and postsurgical changes discussed in detail by level above.  There are postsurgical changes of posterior interbody fusion and decompression of L3 through S1 without hardware complication.  There is some degree of disc space narrowing, disc bulge with osteophytic ridging and facet joint arthropathy at several levels.  2. There is no spinal canal or foraminal stenosis at the T12-L1 or L1-2 levels.  There is only borderline spinal stenosis at the L2-3 level.  These findings are unchanged.  3. At the L3-4 level there are postsurgical changes without spinal canal or significant foraminal stenosis.  4. At the L4-5 level there is no significant spinal stenosis but there is mild-to-moderate bilateral foraminal stenosis.  5. At the postoperative L5-S1 level there is mild spinal stenosis with marked bilateral foraminal stenosis.    Clinically is about the same.  Still complaining of mid to low back pain.  Still has pain in the anterior thighs on both sides.  He has been working on his strength at home        Past Medical History:   Diagnosis Date    Cancer     Kidney - right nephrectomy    Coronary artery disease     Hypertension     Pacemaker      Social History     Socioeconomic History    Marital status:      Spouse name: Not on file    Number of children: Not on file    Years of education: Not on file    Highest education level: Not on file   Occupational History    Not on file   Social Needs    Financial resource strain: Not on file    Food insecurity:     Worry: Not on file     Inability: Not on  "file    Transportation needs:     Medical: Not on file     Non-medical: Not on file   Tobacco Use    Smoking status: Never Smoker    Smokeless tobacco: Current User   Substance and Sexual Activity    Alcohol use: Yes     Comment: beer most days    Drug use: Not on file    Sexual activity: Not on file   Lifestyle    Physical activity:     Days per week: Not on file     Minutes per session: Not on file    Stress: Not on file   Relationships    Social connections:     Talks on phone: Not on file     Gets together: Not on file     Attends [employer] service: Not on file     Active member of club or organization: Not on file     Attends meetings of clubs or organizations: Not on file     Relationship status: Not on file   Other Topics Concern    Not on file   Social History Narrative    Not on file     Past Surgical History:   Procedure Laterality Date    BONE MARROW ASPIRATION N/A 9/10/2018    Procedure: ASPIRATION, BONE MARROW;  Surgeon: Larry An MD;  Location: [facility] OR [address] [place] [address];  Service: Neurosurgery;  Laterality: N/A;    CARDIAC PACEMAKER PLACEMENT      EXTREME LATERAL INTERBODY FUSION (XLIF) OF SPINE Left 9/10/2018    Procedure: Left L3-4 and L4-5 XLIF and MIS TLIF L5-S1 with L3-S1 perc instrumentation;  Surgeon: Larry An MD;  Location: [facility] OR [address] [place] [address];  Service: Neurosurgery;  Laterality: Left;  Length: 4-6 hours  Stay: 2-3 days  Brace: LSO  NeuroMonitoring: EMG, SEP  Bed: 1) Regular slider bed reversed 2) lavonne 4 post  Position: 1) Lateral right side down 2) Prone  Vendor: [employer] (Merly Abbasi)    LAMINECTOMY  4/22/15    L4-S1 laminoforaminotomy    NEPHRECTOMY Right     SPINE SURGERY  1975    lumbar     Family History   Problem Relation Age of Onset    COPD Mother     Cancer Mother     Cancer Father      Vitals:    11/06/19 1109   BP: (!) 158/88   Pulse: 66   Weight: (!) 144.2 kg (317 lb 14.5 oz)   Height: 6' 1" (1.854 m)       Review of Systems   Constitutional: Negative for " chills, diaphoresis, fatigue, fever and unexpected weight change.   HENT: Negative for trouble swallowing.    Eyes: Negative for visual disturbance.   Respiratory: Negative for shortness of breath.    Cardiovascular: Negative for chest pain.   Gastrointestinal: Negative for abdominal pain, constipation, diarrhea, nausea and vomiting.   Genitourinary: Negative for difficulty urinating.   Musculoskeletal: Negative for arthralgias, back pain, gait problem, joint swelling, myalgias, neck pain and neck stiffness.   Neurological: Negative for dizziness, speech difficulty, weakness, light-headedness, numbness and headaches.          Objective:      Physical Exam   Constitutional: He is oriented to person, place, and time. He appears well-developed and well-nourished.   Neurological: He is alert and oriented to person, place, and time.           Assessment:       1. Back pain, unspecified back location, unspecified back pain laterality, unspecified chronicity           Plan:     I am going to check with neurosurgery to see if they feel that his fusion is maturing as it should.  We are going to try to get him some symptomatic relief with interlaminar epidural steroid injections at L2-3.  Consider caudal injection.  Ultimately there may not be much that we can do to control his pain any better.  I offered him outpatient physical therapy for strengthening which she politely declined in favor of home exercises.  I will see him back after the procedure      Back pain, unspecified back location, unspecified back pain laterality, unspecified chronicity

## 2019-11-29 ENCOUNTER — PATIENT MESSAGE (OUTPATIENT)
Dept: SPINE | Facility: CLINIC | Age: 69
End: 2019-11-29

## 2019-12-01 ENCOUNTER — HOSPITAL ENCOUNTER (EMERGENCY)
Facility: HOSPITAL | Age: 69
Discharge: HOME | End: 2019-12-01
Attending: EMERGENCY MEDICINE
Payer: MEDICARE

## 2019-12-01 VITALS
OXYGEN SATURATION: 99 % | SYSTOLIC BLOOD PRESSURE: 138 MMHG | HEIGHT: 73 IN | DIASTOLIC BLOOD PRESSURE: 67 MMHG | RESPIRATION RATE: 20 BRPM | HEART RATE: 68 BPM | BODY MASS INDEX: 41.75 KG/M2 | WEIGHT: 315 LBS | TEMPERATURE: 99 F

## 2019-12-01 DIAGNOSIS — M54.32 SCIATICA OF LEFT SIDE: ICD-10-CM

## 2019-12-01 DIAGNOSIS — M71.22 BAKER'S CYST OF KNEE, LEFT: ICD-10-CM

## 2019-12-01 DIAGNOSIS — M54.30 SCIATICA, UNSPECIFIED LATERALITY: Primary | ICD-10-CM

## 2019-12-01 DIAGNOSIS — M79.606 LEG PAIN: ICD-10-CM

## 2019-12-01 LAB
ALBUMIN SERPL BCP-MCNC: 4 G/DL (ref 3.5–5.2)
ALP SERPL-CCNC: 59 U/L (ref 55–135)
ALT SERPL W/O P-5'-P-CCNC: 21 U/L (ref 10–44)
ANION GAP SERPL CALC-SCNC: 8 MMOL/L (ref 8–16)
AST SERPL-CCNC: 27 U/L (ref 10–40)
BASOPHILS # BLD AUTO: 0.05 K/UL (ref 0–0.2)
BASOPHILS NFR BLD: 0.8 % (ref 0–1.9)
BILIRUB SERPL-MCNC: 0.9 MG/DL (ref 0.1–1)
BUN SERPL-MCNC: 12 MG/DL (ref 8–23)
CALCIUM SERPL-MCNC: 9.1 MG/DL (ref 8.7–10.5)
CHLORIDE SERPL-SCNC: 90 MMOL/L (ref 95–110)
CO2 SERPL-SCNC: 31 MMOL/L (ref 23–29)
CREAT SERPL-MCNC: 0.9 MG/DL (ref 0.5–1.4)
DIFFERENTIAL METHOD: ABNORMAL
EOSINOPHIL # BLD AUTO: 0.1 K/UL (ref 0–0.5)
EOSINOPHIL NFR BLD: 2.2 % (ref 0–8)
ERYTHROCYTE [DISTWIDTH] IN BLOOD BY AUTOMATED COUNT: 13 % (ref 11.5–14.5)
EST. GFR  (AFRICAN AMERICAN): >60 ML/MIN/1.73 M^2
EST. GFR  (NON AFRICAN AMERICAN): >60 ML/MIN/1.73 M^2
GLUCOSE SERPL-MCNC: 115 MG/DL (ref 70–110)
HCT VFR BLD AUTO: 45.6 % (ref 40–54)
HGB BLD-MCNC: 14.7 G/DL (ref 14–18)
IMM GRANULOCYTES # BLD AUTO: 0.02 K/UL (ref 0–0.04)
IMM GRANULOCYTES NFR BLD AUTO: 0.3 % (ref 0–0.5)
INR PPP: 1.1
LYMPHOCYTES # BLD AUTO: 1.3 K/UL (ref 1–4.8)
LYMPHOCYTES NFR BLD: 20.4 % (ref 18–48)
MCH RBC QN AUTO: 28.7 PG (ref 27–31)
MCHC RBC AUTO-ENTMCNC: 32.2 G/DL (ref 32–36)
MCV RBC AUTO: 89 FL (ref 82–98)
MONOCYTES # BLD AUTO: 0.5 K/UL (ref 0.3–1)
MONOCYTES NFR BLD: 8 % (ref 4–15)
NEUTROPHILS # BLD AUTO: 4.5 K/UL (ref 1.8–7.7)
NEUTROPHILS NFR BLD: 68.3 % (ref 38–73)
NRBC BLD-RTO: 0 /100 WBC
PLATELET # BLD AUTO: 232 K/UL (ref 150–350)
PMV BLD AUTO: 8.2 FL (ref 9.2–12.9)
POTASSIUM SERPL-SCNC: 4.9 MMOL/L (ref 3.5–5.1)
PROT SERPL-MCNC: 7.5 G/DL (ref 6–8.4)
PROTHROMBIN TIME: 13.6 SEC (ref 10.6–14.8)
RBC # BLD AUTO: 5.12 M/UL (ref 4.6–6.2)
SODIUM SERPL-SCNC: 129 MMOL/L (ref 136–145)
WBC # BLD AUTO: 6.51 K/UL (ref 3.9–12.7)

## 2019-12-01 PROCEDURE — 85610 PROTHROMBIN TIME: CPT

## 2019-12-01 PROCEDURE — 25000003 PHARM REV CODE 250: Performed by: STUDENT IN AN ORGANIZED HEALTH CARE EDUCATION/TRAINING PROGRAM

## 2019-12-01 PROCEDURE — 36415 COLL VENOUS BLD VENIPUNCTURE: CPT

## 2019-12-01 PROCEDURE — 99285 EMERGENCY DEPT VISIT HI MDM: CPT | Mod: 25

## 2019-12-01 PROCEDURE — 80053 COMPREHEN METABOLIC PANEL: CPT

## 2019-12-01 PROCEDURE — 85025 COMPLETE CBC W/AUTO DIFF WBC: CPT

## 2019-12-01 RX ORDER — GABAPENTIN 300 MG/1
300 CAPSULE ORAL 3 TIMES DAILY
Qty: 90 CAPSULE | Refills: 11 | Status: SHIPPED | OUTPATIENT
Start: 2019-12-01 | End: 2020-11-30

## 2019-12-01 RX ORDER — ACETAMINOPHEN 500 MG
1000 TABLET ORAL
Status: COMPLETED | OUTPATIENT
Start: 2019-12-01 | End: 2019-12-01

## 2019-12-01 RX ORDER — DIAZEPAM 5 MG/1
5 TABLET ORAL
Status: COMPLETED | OUTPATIENT
Start: 2019-12-01 | End: 2019-12-01

## 2019-12-01 RX ADMIN — DIAZEPAM 5 MG: 5 TABLET ORAL at 12:12

## 2019-12-01 RX ADMIN — ACETAMINOPHEN 1000 MG: 500 TABLET, FILM COATED ORAL at 12:12

## 2019-12-01 NOTE — ED PROVIDER NOTES
Encounter Date: 12/1/2019       History     Chief Complaint   Patient presents with    Back Pain    Leg Pain     LEFT     HPI     68 yo M history of hypertension, CAD with pacemaker, spinal surgery presenting to the ED with left sided back and leg pain x2 weeks. Patient states that he had been more active recently with doing squats and he subsequently developed pain in his left hamstring and the back of his left knee.  Patient states that he has continued to have pain shooting from his left back down into his left leg and notes some numbness to his left foot.  Patient states that he has pain that is more severe than he has had in the past.  Patient had a CT scan done on 10/30 which showed his hardware in place.  Patient has marked disc narrowing at the L4-L5 and L5-S1 levels.  There is osteophytic ridging and disc bulge with mild to moderate bilateral foraminal stenosis at L4-L5.  At L5-S1 there is postsurgical changes of posterior interbody fusion with left laminectomy.  There is left greater than right facet joint arthropathy with ligamentum flavum thickening.  There is disc space narrowing with mild spinal stenosis and marked bilateral foraminal stenosis.  Patient denies any trauma to the back or leg.  Denies significant swelling to the left side, no asymmetry. No fevers or chills, nausea vomiting, shortness of breath, chest pain.  No history of DVTs PEs.  No long car rides.  No active cancers.    Patient has not taken any medications for his pain as he does not like to take pain meds.        Review of patient's allergies indicates:  No Known Allergies  Past Medical History:   Diagnosis Date    Cancer     Kidney - right nephrectomy    Coronary artery disease     Hypertension     Pacemaker      Past Surgical History:   Procedure Laterality Date    BONE MARROW ASPIRATION N/A 9/10/2018    Procedure: ASPIRATION, BONE MARROW;  Surgeon: Larry An MD;  Location: [facility] OR [address] [place] [address];  Service: Neurosurgery;   Laterality: N/A;    CARDIAC PACEMAKER PLACEMENT      EXTREME LATERAL INTERBODY FUSION (XLIF) OF SPINE Left 9/10/2018    Procedure: Left L3-4 and L4-5 XLIF and MIS TLIF L5-S1 with L3-S1 perc instrumentation;  Surgeon: Larry An MD;  Location: [facility] OR [address] [place] [address];  Service: Neurosurgery;  Laterality: Left;  Length: 4-6 hours  Stay: 2-3 days  Brace: LSO  NeuroMonitoring: EMG, SEP  Bed: 1) Regular slider bed reversed 2) lavonne 4 post  Position: 1) Lateral right side down 2) Prone  Vendor: [employer] (Merly Abb[employer])    LAMINECTOMY  4/22/15    L4-S1 laminoforaminotomy    NEPHRECTOMY Right     SPINE SURGERY  1975    lumbar     Family History   Problem Relation Age of Onset    COPD Mother     Cancer Mother     Cancer Father      Social History     Tobacco Use    Smoking status: Never Smoker    Smokeless tobacco: Current User   Substance Use Topics    Alcohol use: Yes     Comment: beer most days    Drug use: Not on file     Review of Systems   Constitutional: Negative for fever.   HENT: Negative for sore throat.    Respiratory: Negative for shortness of breath.    Cardiovascular: Negative for chest pain.   Gastrointestinal: Positive for vomiting. Negative for abdominal pain, constipation and nausea.   Genitourinary: Negative for decreased urine volume, dysuria, penile pain, testicular pain and urgency.   Musculoskeletal: Positive for arthralgias and back pain.   Skin: Negative for rash.   Neurological: Negative for weakness.   Hematological: Does not bruise/bleed easily.       Physical Exam     Initial Vitals [12/01/19 1140]   BP Pulse Resp Temp SpO2   (!) 169/91 66 18 98.2 °F (36.8 °C) 97 %      MAP       --         Physical Exam    Nursing note and vitals reviewed.  Constitutional: He appears well-developed and well-nourished. No distress.   HENT:   Head: Normocephalic and atraumatic.   Eyes: EOM are normal. Pupils are equal, round, and reactive to light.   Neck: Normal range of motion. Neck supple.    Cardiovascular: Normal rate and regular rhythm.   No murmur heard.  Pulmonary/Chest: Breath sounds normal. No respiratory distress. He has no wheezes.   Abdominal: Soft. Bowel sounds are normal. He exhibits no distension. There is no tenderness. There is no rebound.   Obese male   Musculoskeletal:   No edema to bilateral LE. There are some erythematous skin changes bilaterally. No induration, no warmth. Pt has tenderness to palpation of the posterior knee on the L. 2+ DP pulses bilaterally. Pt has tenderness over the ankle and knee. No difficulty with full ROM. No weakness. Numbness to touch noted over the L foot.    Neurological: He is alert and oriented to person, place, and time. He has normal strength. No cranial nerve deficit.   Skin: Skin is warm. Capillary refill takes less than 2 seconds. No erythema.   Psychiatric: He has a normal mood and affect.         ED Course   Procedures  Labs Reviewed   CBC W/ AUTO DIFFERENTIAL - Abnormal; Notable for the following components:       Result Value    MPV 8.2 (*)     All other components within normal limits   COMPREHENSIVE METABOLIC PANEL - Abnormal; Notable for the following components:    Sodium 129 (*)     Chloride 90 (*)     CO2 31 (*)     Glucose 115 (*)     All other components within normal limits   PROTIME-INR   URINALYSIS          Imaging Results          US Lower Extremity Arteries Left (Final result)  Result time 12/01/19 14:59:44    Final result by Johny Rdz MD (12/01/19 14:59:44)                 Impression:      Patent left lower extremity arteries with monophasic waveform in left peroneal artery but no focal elevated peak systolic velocity to suggest a focal hemodynamically significant stenosis.      Electronically signed by: Johny Rdz MD  Date:    12/01/2019  Time:    14:59             Narrative:    EXAMINATION:  US LOWER EXTREMITY ARTERIES LEFT    CLINICAL HISTORY:  Pain in leg, unspecified, left leg pain for 2  weeks    COMPARISON:  None    FINDINGS:  Grayscale, color and spectral Doppler analysis of the bilateral lower extremity arteries was performed.    Left common femoral, superficial femoral, and popliteal arteries contain triphasic spectral waveforms.  Grayscale images show no significant atherosclerotic plaque identified.    Left anterior tibial and posterior tibial arteries also contained triphasic spectral waveforms, as well as the dorsalis pedis artery.  Left peroneal artery contain monophasic spectral waveform.  No focal elevated peak systolic velocity throughout left lower extremity arteries to suggest hemodynamically significant stenosis.                               US Lower Extremity Veins Left (Final result)  Result time 12/01/19 14:23:42    Final result by Johny Rdz MD (12/01/19 14:23:42)                 Impression:      Negative for DVT throughout left lower extremity.      Electronically signed by: Johny Rdz MD  Date:    12/01/2019  Time:    14:23             Narrative:    EXAMINATION:  US LOWER EXTREMITY VEINS LEFT    CLINICAL HISTORY:  Pain in leg, unspecified 2 weeks of left leg pain    COMPARISON:  Ultrasound 07/06/2016    FINDINGS:  Grayscale, color Doppler, and spectral Doppler analysis was performed.    Left common femoral, femoral, popliteal, and proximal great saphenous veins show normal compressibility, augmentation, and color Doppler flow.    Left posterior tibial, anterior tibial, and peroneal veins are unremarkable.    Anechoic avascular focus in left popliteal fossa suggest a popliteal cyst.                                 Medical Decision Making:   Initial Assessment:   69-year-old male presenting to the ED with left-sided leg pain and back pain.  Vital signs are within normal limits.  On exam patient has full strength but has some tenderness behind the posterior knee as well as the left ankle.  There is no asymmetry, swelling or signs of cellulitis on exam.  Patient has some  tenderness over the left paraspinal low back L4-L5, S1.  Positive straight leg raise on that side.  An arterial ultrasound of the left leg are negative for clots and thrombi.  Patient did have evidence of a Baker cyst behind the left knee which may be causing patient's pain. Patient was given 1g of Tylenol and Valium with minimal relief pain. We will give patient follow up with Neurosurgery to be evaluated as patient has been operated on the past.    Stable for discharge.     [facility]   Emergency Medicine PGY3   3:30 PM                                   Clinical Impression:       ICD-10-CM ICD-9-CM   1. Sciatica, unspecified laterality M54.30 724.3   2. Leg pain M79.606 729.5   3. Baker's cyst of knee, left M71.22 727.51   4. Sciatica of left side M54.32 724.3           I personally saw and examined the patient.  I have reviewed and agree with the resident's findings, including all diagnostic interpretations and treatment plans as documented.  I was present for key portions of separately performed procedures.                    Edwar Maciel MD  12/01/19 4838

## 2019-12-01 NOTE — DISCHARGE INSTRUCTIONS
Imaging Results              US Lower Extremity Arteries Left (Final result)  Result time 12/01/19 14:59:44      Final result by Johny Rdz MD (12/01/19 14:59:44)                   Impression:      Patent left lower extremity arteries with monophasic waveform in left peroneal artery but no focal elevated peak systolic velocity to suggest a focal hemodynamically significant stenosis.      Electronically signed by: Johny Rdz MD  Date:    12/01/2019  Time:    14:59               Narrative:    EXAMINATION:  US LOWER EXTREMITY ARTERIES LEFT    CLINICAL HISTORY:  Pain in leg, unspecified, left leg pain for 2 weeks    COMPARISON:  None    FINDINGS:  Grayscale, color and spectral Doppler analysis of the bilateral lower extremity arteries was performed.    Left common femoral, superficial femoral, and popliteal arteries contain triphasic spectral waveforms.  Grayscale images show no significant atherosclerotic plaque identified.    Left anterior tibial and posterior tibial arteries also contained triphasic spectral waveforms, as well as the dorsalis pedis artery.  Left peroneal artery contain monophasic spectral waveform.  No focal elevated peak systolic velocity throughout left lower extremity arteries to suggest hemodynamically significant stenosis.                                       US Lower Extremity Veins Left (Final result)  Result time 12/01/19 14:23:42      Final result by Johny Rdz MD (12/01/19 14:23:42)                   Impression:      Negative for DVT throughout left lower extremity.      Electronically signed by: Johny Rdz MD  Date:    12/01/2019  Time:    14:23               Narrative:    EXAMINATION:  US LOWER EXTREMITY VEINS LEFT    CLINICAL HISTORY:  Pain in leg, unspecified 2 weeks of left leg pain    COMPARISON:  Ultrasound 07/06/2016    FINDINGS:  Grayscale, color Doppler, and spectral Doppler analysis was performed.    Left common femoral, femoral, popliteal, and proximal great saphenous  veins show normal compressibility, augmentation, and color Doppler flow.    Left posterior tibial, anterior tibial, and peroneal veins are unremarkable.    Anechoic avascular focus in left popliteal fossa suggest a popliteal cyst.

## 2019-12-02 ENCOUNTER — PATIENT MESSAGE (OUTPATIENT)
Dept: SPINE | Facility: CLINIC | Age: 69
End: 2019-12-02

## 2019-12-02 ENCOUNTER — TELEPHONE (OUTPATIENT)
Dept: SPINE | Facility: CLINIC | Age: 69
End: 2019-12-02

## 2019-12-02 DIAGNOSIS — M54.9 BACK PAIN, UNSPECIFIED BACK LOCATION, UNSPECIFIED BACK PAIN LATERALITY, UNSPECIFIED CHRONICITY: Primary | ICD-10-CM

## 2019-12-02 RX ORDER — ACETAMINOPHEN AND CODEINE PHOSPHATE 300; 30 MG/1; MG/1
1 TABLET ORAL EVERY 6 HOURS PRN
Qty: 30 TABLET | Refills: 0 | Status: SHIPPED | OUTPATIENT
Start: 2019-12-02 | End: 2019-12-12

## 2019-12-02 NOTE — TELEPHONE ENCOUNTER
Pt would like a prescription for Tylenol #3 if possible as the pain is getting worse. Please advise.

## 2019-12-02 NOTE — TELEPHONE ENCOUNTER
Yes please get him scheduled for interlaminar epidural at L2-3.  I did speak to Neurosurgery and they feel that his fusion is maturing as expected

## 2019-12-19 ENCOUNTER — LAB VISIT (OUTPATIENT)
Dept: LAB | Facility: HOSPITAL | Age: 69
End: 2019-12-19
Attending: INTERNAL MEDICINE
Payer: MEDICARE

## 2019-12-19 DIAGNOSIS — R60.9 EDEMA: ICD-10-CM

## 2019-12-19 DIAGNOSIS — Z95.0 CARDIAC PACEMAKER IN SITU: ICD-10-CM

## 2019-12-19 DIAGNOSIS — I10 ESSENTIAL HYPERTENSION, MALIGNANT: ICD-10-CM

## 2019-12-19 DIAGNOSIS — E78.5 HYPERLIPEMIA: Primary | ICD-10-CM

## 2019-12-19 LAB
BNP SERPL-MCNC: 33 PG/ML (ref 0–99)
CHOLEST SERPL-MCNC: 172 MG/DL (ref 120–199)
CHOLEST/HDLC SERPL: 3.9 {RATIO} (ref 2–5)
HDLC SERPL-MCNC: 44 MG/DL (ref 40–75)
HDLC SERPL: 25.6 % (ref 20–50)
LDLC SERPL CALC-MCNC: 103.4 MG/DL (ref 63–159)
MAGNESIUM SERPL-MCNC: 1.8 MG/DL (ref 1.6–2.6)
NONHDLC SERPL-MCNC: 128 MG/DL
TRIGL SERPL-MCNC: 123 MG/DL (ref 30–150)

## 2019-12-19 PROCEDURE — 36415 COLL VENOUS BLD VENIPUNCTURE: CPT

## 2019-12-19 PROCEDURE — 83880 ASSAY OF NATRIURETIC PEPTIDE: CPT

## 2019-12-19 PROCEDURE — 80061 LIPID PANEL: CPT

## 2019-12-19 PROCEDURE — 83735 ASSAY OF MAGNESIUM: CPT

## 2020-01-09 ENCOUNTER — OFFICE VISIT (OUTPATIENT)
Dept: NEUROSURGERY | Facility: CLINIC | Age: 70
End: 2020-01-09
Payer: MEDICARE

## 2020-01-09 VITALS — SYSTOLIC BLOOD PRESSURE: 138 MMHG | DIASTOLIC BLOOD PRESSURE: 86 MMHG | HEART RATE: 64 BPM

## 2020-01-09 DIAGNOSIS — M54.42 CHRONIC MIDLINE LOW BACK PAIN WITH LEFT-SIDED SCIATICA: Primary | ICD-10-CM

## 2020-01-09 DIAGNOSIS — Z98.1 HISTORY OF LUMBAR FUSION: ICD-10-CM

## 2020-01-09 DIAGNOSIS — G89.29 CHRONIC MIDLINE LOW BACK PAIN WITH LEFT-SIDED SCIATICA: Primary | ICD-10-CM

## 2020-01-09 DIAGNOSIS — Z98.890 HISTORY OF LUMBAR LAMINECTOMY: ICD-10-CM

## 2020-01-09 PROCEDURE — 99999 PR PBB SHADOW E&M-EST. PATIENT-LVL III: ICD-10-PCS | Mod: PBBFAC,HCNC,, | Performed by: STUDENT IN AN ORGANIZED HEALTH CARE EDUCATION/TRAINING PROGRAM

## 2020-01-09 PROCEDURE — 1125F AMNT PAIN NOTED PAIN PRSNT: CPT | Mod: HCNC,S$GLB,, | Performed by: STUDENT IN AN ORGANIZED HEALTH CARE EDUCATION/TRAINING PROGRAM

## 2020-01-09 PROCEDURE — 99999 PR PBB SHADOW E&M-EST. PATIENT-LVL III: CPT | Mod: PBBFAC,HCNC,, | Performed by: STUDENT IN AN ORGANIZED HEALTH CARE EDUCATION/TRAINING PROGRAM

## 2020-01-09 PROCEDURE — 3075F PR MOST RECENT SYSTOLIC BLOOD PRESS GE 130-139MM HG: ICD-10-PCS | Mod: HCNC,CPTII,S$GLB, | Performed by: STUDENT IN AN ORGANIZED HEALTH CARE EDUCATION/TRAINING PROGRAM

## 2020-01-09 PROCEDURE — 1101F PT FALLS ASSESS-DOCD LE1/YR: CPT | Mod: HCNC,CPTII,S$GLB, | Performed by: STUDENT IN AN ORGANIZED HEALTH CARE EDUCATION/TRAINING PROGRAM

## 2020-01-09 PROCEDURE — 3079F DIAST BP 80-89 MM HG: CPT | Mod: HCNC,CPTII,S$GLB, | Performed by: STUDENT IN AN ORGANIZED HEALTH CARE EDUCATION/TRAINING PROGRAM

## 2020-01-09 PROCEDURE — 1101F PR PT FALLS ASSESS DOC 0-1 FALLS W/OUT INJ PAST YR: ICD-10-PCS | Mod: HCNC,CPTII,S$GLB, | Performed by: STUDENT IN AN ORGANIZED HEALTH CARE EDUCATION/TRAINING PROGRAM

## 2020-01-09 PROCEDURE — 99214 PR OFFICE/OUTPT VISIT, EST, LEVL IV, 30-39 MIN: ICD-10-PCS | Mod: HCNC,S$GLB,, | Performed by: STUDENT IN AN ORGANIZED HEALTH CARE EDUCATION/TRAINING PROGRAM

## 2020-01-09 PROCEDURE — 1125F PR PAIN SEVERITY QUANTIFIED, PAIN PRESENT: ICD-10-PCS | Mod: HCNC,S$GLB,, | Performed by: STUDENT IN AN ORGANIZED HEALTH CARE EDUCATION/TRAINING PROGRAM

## 2020-01-09 PROCEDURE — 1159F PR MEDICATION LIST DOCUMENTED IN MEDICAL RECORD: ICD-10-PCS | Mod: HCNC,S$GLB,, | Performed by: STUDENT IN AN ORGANIZED HEALTH CARE EDUCATION/TRAINING PROGRAM

## 2020-01-09 PROCEDURE — 99214 OFFICE O/P EST MOD 30 MIN: CPT | Mod: HCNC,S$GLB,, | Performed by: STUDENT IN AN ORGANIZED HEALTH CARE EDUCATION/TRAINING PROGRAM

## 2020-01-09 PROCEDURE — 3079F PR MOST RECENT DIASTOLIC BLOOD PRESSURE 80-89 MM HG: ICD-10-PCS | Mod: HCNC,CPTII,S$GLB, | Performed by: STUDENT IN AN ORGANIZED HEALTH CARE EDUCATION/TRAINING PROGRAM

## 2020-01-09 PROCEDURE — 3075F SYST BP GE 130 - 139MM HG: CPT | Mod: HCNC,CPTII,S$GLB, | Performed by: STUDENT IN AN ORGANIZED HEALTH CARE EDUCATION/TRAINING PROGRAM

## 2020-01-09 PROCEDURE — 1159F MED LIST DOCD IN RCRD: CPT | Mod: HCNC,S$GLB,, | Performed by: STUDENT IN AN ORGANIZED HEALTH CARE EDUCATION/TRAINING PROGRAM

## 2020-01-09 NOTE — LETTER
January 9, 2020      Cam Burton MD  [address] [place] [address] Dr  [address], [address]  [facility] [address]           [place] - Neurosurgery  [address] [place] [address]  Phone: [phone]  Fax: [phone]          Patient: Jarrett Blanco   MR Number: [id number]   YOB: 1950   Date of Visit: 1/9/2020       Dear Dr. Cam Burton:    Thank you for referring Jarrett Blanco to me for evaluation. Attached you will find relevant portions of my assessment and plan of care.    If you have questions, please do not hesitate to call me. I look forward to following Jarrett Blanco along with you.    Sincerely,    Colten Anaya MD    Enclosure  CC:  No Recipients    If you would like to receive this communication electronically, please contact [email or web address] or [phone] to request more information on [employer] Link access.    For providers and/or their staff who would like to refer a patient to [facility], please contact us through our one-stop-shop provider referral line, [facility], at [phone].    If you feel you have received this communication in error or would no longer like to receive these types of communications, please e-mail [email or web address]

## 2020-01-09 NOTE — PROGRESS NOTES
[facility] Neurosurgery  Clinic Consult     Consult Requested By: Cam Burton MD  PCP: Luis Alfredo Sands Jr, MD    SUBJECTIVE:     Chief Complaint:   Chief Complaint   Patient presents with    Lumbar Spine Pain (L-Spine)     Patient reports low back pain with left leg pain; denies numbness and tingling; patient reports previous fusion; pain 9/10       History of Present Illness:  Jarrett Blanco is a 69 y.o. male with a pacemaker who presents for evaluation of low back and left leg pain. Patient reports history of 3 lumbar surgeries. He is now s/p L3-S1 fusion with Dr. An in 9/2018. He reports initially he experienced improvement in low back pain after surgery, but he has experienced worsening pain over the last year. He reports the low back pain is constant. It will occasionally radiate into the left lower extremity, sometimes in the posterior thigh and sometimes anterior distal leg. He denies numbness or tingling in his leg. Reports subjective weakness, but reports he is out of shape and fatigues easily. He reports several year history of bilateral hip flexion weakness. He is unable to walk long distances due to his back pain. He states he also experiences increased pain with prolonged sitting. He denies bowel/bladder dysfunction. He has not attended physical therapy or received injections with pain management.     VAS 9/10 back   PHQ 4  Oswestry Disability Index 50    Past Medical History:   Diagnosis Date    Cancer     Kidney - right nephrectomy    Coronary artery disease     Hypertension     Pacemaker      Past Surgical History:   Procedure Laterality Date    BONE MARROW ASPIRATION N/A 9/10/2018    Procedure: ASPIRATION, BONE MARROW;  Surgeon: Larry An MD;  Location: [facility] OR [address] [place] [address];  Service: Neurosurgery;  Laterality: N/A;    CARDIAC PACEMAKER PLACEMENT      EXTREME LATERAL INTERBODY FUSION (XLIF) OF SPINE Left 9/10/2018    Procedure: Left L3-4 and L4-5 XLIF and MIS TLIF L5-S1  with L3-S1 perc instrumentation;  Surgeon: Larry An MD;  Location: [facility] OR [address] [place] [address];  Service: Neurosurgery;  Laterality: Left;  Length: 4-6 hours  Stay: 2-3 days  Brace: LSO  NeuroMonitoring: EMG, SEP  Bed: 1) Regular slider bed reversed 2) lavonne 4 post  Position: 1) Lateral right side down 2) Prone  Vendor: [employer]    LAMINECTOMY  4/22/15    L4-S1 laminoforaminotomy    NEPHRECTOMY Right     SPINE SURGERY  1975    lumbar     Family History   Problem Relation Age of Onset    COPD Mother     Cancer Mother     Cancer Father      Social History     Tobacco Use    Smoking status: Never Smoker    Smokeless tobacco: Current User   Substance Use Topics    Alcohol use: Yes     Comment: beer most days    Drug use: Not on file      Review of patient's allergies indicates:  No Known Allergies    Current Outpatient Medications:     acetaminophen (TYLENOL) 500 MG tablet, Take 2 tablets (1,000 mg total) by mouth every 8 (eight) hours as needed for Pain., Disp: 60 tablet, Rfl: 0    acetaminophen/diphenhydramine (TYLENOL PM ORAL), Take 1 tablet by mouth once., Disp: , Rfl:     atenolol (TENORMIN) 25 MG tablet, Take 25 mg by mouth 2 (two) times daily., Disp: , Rfl:     BENICAR 20 mg tablet, Take 20 mg by mouth every morning. , Disp: , Rfl:     gabapentin (NEURONTIN) 300 MG capsule, Take 1 capsule (300 mg total) by mouth 3 (three) times daily., Disp: 90 capsule, Rfl: 11    hydroCHLOROthiazide (MICROZIDE) 12.5 mg capsule, Take 12.5 mg by mouth every morning. , Disp: , Rfl:     ibuprofen (ADVIL,MOTRIN) 200 MG tablet, Take 200 mg by mouth every 6 (six) hours as needed for Pain., Disp: , Rfl:     ubidecarenone (CO Q-10 ORAL), Take 1 tablet by mouth daily as needed., Disp: , Rfl:     Review of Systems:   Constitutional: no fever, chills or night sweats. No changes in weight   Eyes: no visual changes   ENT: no nasal congestion or sore throat   Respiratory: no cough or shortness of breath    Cardiovascular: no chest pain or palpitations   Gastrointestinal: no nausea or vomiting   Genitourinary: no hematuria or dysuria   Integument/Breast: no rash or pruritis   Hematologic/Lymphatic: no easy bruising or lymphadenopathy   Musculoskeletal: +back pain, myalgias   Neurological: no seizures or tremors   Behavioral/Psych: no auditory or visual hallucinations   Endocrine: no heat or cold intolerance         OBJECTIVE:     Vital Signs (Most Recent):  Pulse: 64 (01/09/20 1229)  BP: 138/86 (01/09/20 1229)    Physical Exam:   General: well developed, well nourished, no distress.   Neurologic: Alert and oriented. Thought content appropriate. GCS 15.   Head: normocephalic, atraumatic  Eyes: EOMI.  Neck: trachea midline, no JVD   Cardiovascular: no LE edema  Pulmonary: normal respirations, no signs of respiratory distress  Abdomen:  non-distended  Sensory: intact to light touch throughout  Skin: Skin is warm, dry and intact.    Motor Strength: Moves all extremities spontaneously with good tone. No abnormal movements seen.       Iliopsoas Quadriceps Knee  Flexion Tibialis  anterior Gastro- cnemius EHL   Lower: R 3/5 5/5 5/5 5/5 5/5 5/5    L 3/5 5/5 5/5 5/5 5/5 5/5     DTR's: muted  Clonus: absent  Gait: slow     Lumbar Spine: decreased ROM, no TTP          Diagnostic Results:  I have independently reviewed the following imaging:  CT: Reviewed  There is interbody fusion L3-4, L4-5 and L5-S1 with posterior instrumentation, with stable alignment no fracture iris evidence of pseudoarthrosis, at the adjacent segments she will maintain disc height, no listhesis  No central foraminal stenosis  ASSESSMENT/PLAN:     Chronic midline low back pain with left-sided sciatica    History of lumbar fusion    History of lumbar laminectomy        Jarrett Blanco is a 69 y.o. male  With a complex spinal history.  Status post multiple surgeries last by Dr. Cabral including a 3 level minimally invasive T lift with L3-S1.  Patient denies  significant benefit in his back or lower extremity pain.  This has persisted he has high frequency and severity of pain.  He has limited mobilization secondary to this he has burning the left leg  I he has multifocal pathology within myofascial pain tightness primarily in the left hamstring cons suspicion of Baker cyst the left knee as well as left knee osteoarthritis pain with tender to palpation  BMI > 40 class III  His imaging which revealed and drills reveals no complication stable hardware intact alignment, no adjacent segment disease  He has chronic neuropathy multifactorial limited mobility.  We reviewed recommendations including physical therapy weight loss decreased biomechanical load and referred to pain management for consultation.  To maximize strategies and she failed discussion of SCS    We discussed non-surgical treatment options.  We discussed medical management and referral options including anti-inflammatories, muscle relaxants, narcotics and neuropathic pain medications.  We discussed physical therapy for back strengthening and flexibility.  We talked about injection therapy for both treatment and diagnosis.      Patient verbalized understanding of plan. Encouraged to call with any questions or concerns.     This note was partially dictated using voice recognition software, so please excuse any errors that were not corrected.

## 2020-01-17 ENCOUNTER — TELEPHONE (OUTPATIENT)
Dept: SPINE | Facility: CLINIC | Age: 70
End: 2020-01-17

## 2020-01-17 NOTE — TELEPHONE ENCOUNTER
Spoke tp pt wife tghey are coming in on 1/21/20 to discuss options since Dr. Anaya cannot do anything for him per wife

## 2020-01-17 NOTE — TELEPHONE ENCOUNTER
----- Message from Stacey M Lefort sent at 1/17/2020 10:32 AM CST -----  Pt's wife called and asked for a call back - her  saw Dr Burton and he sent him to Dr Anaya - Dr Anaya is sending him back to Dr Burton so that Dr Burton can set him up with Dr Dukes.  His wife Maria G can be reached at [phone].  Thank you.

## 2020-01-22 ENCOUNTER — PES CALL (OUTPATIENT)
Dept: ADMINISTRATIVE | Facility: CLINIC | Age: 70
End: 2020-01-22

## 2020-03-11 RX ORDER — ACETAMINOPHEN AND CODEINE PHOSPHATE 300; 30 MG/1; MG/1
1 TABLET ORAL EVERY 6 HOURS PRN
Qty: 30 TABLET | Refills: 0 | Status: SHIPPED | OUTPATIENT
Start: 2020-03-11

## 2020-03-11 NOTE — TELEPHONE ENCOUNTER
----- Message from Jacqui Phan sent at 3/11/2020 12:06 PM CDT -----  Contact: Pt  Type:  RX Refill Request    Who Called:  Pt    Refill or New Rx:  refill  RX Name and Strength:  acetaminophen (TYLENOL) 500 MG tablet  How is the patient currently taking it? (ex. 1XDay):  As Directed  Is this a 30 day or 90 day RX:  as Directed  Preferred Pharmacy with phone number:    [id number] - [address] AT [facility] [address]  [address]  [place] [address]  Phone: [phone] Fax: [phone]  Local or Mail Order:  local  Ordering Provider:  DENIS Engle Call Back Number:  [phone]  Additional Information:  Please Advise ---Thank you

## 2020-05-05 ENCOUNTER — PATIENT MESSAGE (OUTPATIENT)
Dept: ADMINISTRATIVE | Facility: HOSPITAL | Age: 70
End: 2020-05-05

## 2020-06-30 DIAGNOSIS — Z85.528 HISTORY OF RENAL CELL CARCINOMA: ICD-10-CM

## 2020-07-22 ENCOUNTER — PATIENT MESSAGE (OUTPATIENT)
Dept: FAMILY MEDICINE | Facility: CLINIC | Age: 70
End: 2020-07-22

## 2020-07-24 ENCOUNTER — TELEPHONE (OUTPATIENT)
Dept: HEMATOLOGY/ONCOLOGY | Facility: CLINIC | Age: 70
End: 2020-07-24

## 2020-07-27 ENCOUNTER — TELEPHONE (OUTPATIENT)
Dept: FAMILY MEDICINE | Facility: CLINIC | Age: 70
End: 2020-07-27

## 2020-07-27 ENCOUNTER — TELEPHONE (OUTPATIENT)
Dept: HEMATOLOGY/ONCOLOGY | Facility: CLINIC | Age: 70
End: 2020-07-27

## 2020-07-27 NOTE — TELEPHONE ENCOUNTER
Dr. Baer's office is wanting to know if there is any documentation from 2008 that states what kind of kidney cancer the patient has. She can only see back to 2013 in epic.  She said mainly what she is looking for is anything from 2008 from pathology.  Can you help?

## 2020-08-13 ENCOUNTER — OFFICE VISIT (OUTPATIENT)
Dept: HEMATOLOGY/ONCOLOGY | Facility: CLINIC | Age: 70
End: 2020-08-13
Payer: MEDICARE

## 2020-08-13 VITALS
WEIGHT: 315 LBS | DIASTOLIC BLOOD PRESSURE: 90 MMHG | HEART RATE: 84 BPM | SYSTOLIC BLOOD PRESSURE: 149 MMHG | BODY MASS INDEX: 42.97 KG/M2 | TEMPERATURE: 97 F | RESPIRATION RATE: 18 BRPM

## 2020-08-13 DIAGNOSIS — Z85.528 HISTORY OF KIDNEY CANCER: ICD-10-CM

## 2020-08-13 PROCEDURE — 1159F MED LIST DOCD IN RCRD: CPT | Mod: S$GLB,,, | Performed by: INTERNAL MEDICINE

## 2020-08-13 PROCEDURE — 3008F BODY MASS INDEX DOCD: CPT | Mod: S$GLB,,, | Performed by: INTERNAL MEDICINE

## 2020-08-13 PROCEDURE — 3288F PR FALLS RISK ASSESSMENT DOCUMENTED: ICD-10-PCS | Mod: S$GLB,,, | Performed by: INTERNAL MEDICINE

## 2020-08-13 PROCEDURE — 3077F SYST BP >= 140 MM HG: CPT | Mod: S$GLB,,, | Performed by: INTERNAL MEDICINE

## 2020-08-13 PROCEDURE — 3080F PR MOST RECENT DIASTOLIC BLOOD PRESSURE >= 90 MM HG: ICD-10-PCS | Mod: S$GLB,,, | Performed by: INTERNAL MEDICINE

## 2020-08-13 PROCEDURE — 3288F FALL RISK ASSESSMENT DOCD: CPT | Mod: S$GLB,,, | Performed by: INTERNAL MEDICINE

## 2020-08-13 PROCEDURE — 99204 OFFICE O/P NEW MOD 45 MIN: CPT | Mod: S$GLB,,, | Performed by: INTERNAL MEDICINE

## 2020-08-13 PROCEDURE — 1125F AMNT PAIN NOTED PAIN PRSNT: CPT | Mod: S$GLB,,, | Performed by: INTERNAL MEDICINE

## 2020-08-13 PROCEDURE — 3077F PR MOST RECENT SYSTOLIC BLOOD PRESSURE >= 140 MM HG: ICD-10-PCS | Mod: S$GLB,,, | Performed by: INTERNAL MEDICINE

## 2020-08-13 PROCEDURE — 1100F PTFALLS ASSESS-DOCD GE2>/YR: CPT | Mod: S$GLB,,, | Performed by: INTERNAL MEDICINE

## 2020-08-13 PROCEDURE — 3080F DIAST BP >= 90 MM HG: CPT | Mod: S$GLB,,, | Performed by: INTERNAL MEDICINE

## 2020-08-13 PROCEDURE — 1159F PR MEDICATION LIST DOCUMENTED IN MEDICAL RECORD: ICD-10-PCS | Mod: S$GLB,,, | Performed by: INTERNAL MEDICINE

## 2020-08-13 PROCEDURE — 1100F PR PT FALLS ASSESS DOC 2+ FALLS/FALL W/INJURY/YR: ICD-10-PCS | Mod: S$GLB,,, | Performed by: INTERNAL MEDICINE

## 2020-08-13 PROCEDURE — 1125F PR PAIN SEVERITY QUANTIFIED, PAIN PRESENT: ICD-10-PCS | Mod: S$GLB,,, | Performed by: INTERNAL MEDICINE

## 2020-08-13 PROCEDURE — 99204 PR OFFICE/OUTPT VISIT, NEW, LEVL IV, 45-59 MIN: ICD-10-PCS | Mod: S$GLB,,, | Performed by: INTERNAL MEDICINE

## 2020-08-13 PROCEDURE — 3008F PR BODY MASS INDEX (BMI) DOCUMENTED: ICD-10-PCS | Mod: S$GLB,,, | Performed by: INTERNAL MEDICINE

## 2020-08-13 NOTE — PROGRESS NOTES
[facility] HEM/ONC CONSULTATION      Subjective:       Patient ID: Jarrett Blanco is a 70 y.o. male.    Chief Complaint: No chief complaint on file.  renal cancer     Mr. Blanco is a 71yo male with a PMH of right sided nephrectomy for renal cancer in 2008.  Patient was operated on by Dr. Solorio.  He has been in remission since that time but has been referred as he is concerned about increasing back pain and whether he needs further work up for this process.  He has had four back surgeries in the past but continues to have significant pain.        Past Medical History:   Diagnosis Date    Cancer     Kidney - right nephrectomy    Coronary artery disease     Hypertension     Pacemaker        Past Surgical History:   Procedure Laterality Date    BONE MARROW ASPIRATION N/A 9/10/2018    Procedure: ASPIRATION, BONE MARROW;  Surgeon: Larry An MD;  Location: [facility] OR [address] [place] [address];  Service: Neurosurgery;  Laterality: N/A;    CARDIAC PACEMAKER PLACEMENT      EXTREME LATERAL INTERBODY FUSION (XLIF) OF SPINE Left 9/10/2018    Procedure: Left L3-4 and L4-5 XLIF and MIS TLIF L5-S1 with L3-S1 perc instrumentation;  Surgeon: Larry An MD;  Location: [facility] OR [address] [place] [address];  Service: Neurosurgery;  Laterality: Left;  Length: 4-6 hours  Stay: 2-3 days  Brace: LSO  NeuroMonitoring: EMG, SEP  Bed: 1) Regular slider bed reversed 2) lavonne 4 post  Position: 1) Lateral right side down 2) Prone  Vendor: [employer] ([employer])    LAMINECTOMY  4/22/15    L4-S1 laminoforaminotomy    NEPHRECTOMY Right     SPINE SURGERY  1975    lumbar       Social History     Socioeconomic History    Marital status:      Spouse name: Not on file    Number of children: Not on file    Years of education: Not on file    Highest education level: Not on file   Occupational History    Not on file   Social Needs    Financial resource strain: Not on file    Food insecurity     Worry: Not on file     Inability: Not on file     Transportation needs     Medical: Not on file     Non-medical: Not on file   Tobacco Use    Smoking status: Never Smoker    Smokeless tobacco: Current User   Substance and Sexual Activity    Alcohol use: Yes     Comment: beer most days    Drug use: Not on file    Sexual activity: Not on file   Lifestyle    Physical activity     Days per week: Not on file     Minutes per session: Not on file    Stress: Not on file   Relationships    Social connections     Talks on phone: Not on file     Gets together: Not on file     Attends Latter-day service: Not on file     Active member of club or organization: Not on file     Attends meetings of clubs or organizations: Not on file     Relationship status: Not on file   Other Topics Concern    Not on file   Social History Narrative    Not on file       Family History   Problem Relation Age of Onset    COPD Mother     Cancer Mother     Cancer Father        Review of patient's allergies indicates:  No Known Allergies    Current Outpatient Medications:     atenolol (TENORMIN) 25 MG tablet, Take 25 mg by mouth 2 (two) times daily., Disp: , Rfl:     BENICAR 20 mg tablet, Take 20 mg by mouth every morning. , Disp: , Rfl:     hydroCHLOROthiazide (MICROZIDE) 12.5 mg capsule, Take 12.5 mg by mouth every morning. , Disp: , Rfl:     ibuprofen (ADVIL,MOTRIN) 200 MG tablet, Take 200 mg by mouth every 6 (six) hours as needed for Pain., Disp: , Rfl:     ubidecarenone (CO Q-10 ORAL), Take 1 tablet by mouth daily as needed., Disp: , Rfl:     acetaminophen (TYLENOL) 500 MG tablet, Take 2 tablets (1,000 mg total) by mouth every 8 (eight) hours as needed for Pain., Disp: 60 tablet, Rfl: 0    acetaminophen-codeine 300-30mg (TYLENOL #3) 300-30 mg Tab, Take 1 tablet by mouth every 6 (six) hours as needed. Maximum dose of acetaminophen is 3000 mg from all sources in 24 hours, 2000 mg in hepatic failure patients (Patient not taking: Reported on 8/13/2020), Disp: 30 tablet, Rfl: 0     acetaminophen/diphenhydramine (TYLENOL PM ORAL), Take 1 tablet by mouth once., Disp: , Rfl:     gabapentin (NEURONTIN) 300 MG capsule, Take 1 capsule (300 mg total) by mouth 3 (three) times daily., Disp: 90 capsule, Rfl: 11    All medications and past history have been reviewed.    Review of Systems   Constitutional: Negative for activity change, appetite change, chills, diaphoresis, fatigue, fever and unexpected weight change.   HENT: Negative for congestion, mouth sores and nosebleeds.    Eyes: Negative for visual disturbance.   Respiratory: Positive for shortness of breath. Negative for cough and chest tightness.    Cardiovascular: Negative for chest pain and leg swelling.   Gastrointestinal: Negative for abdominal pain, diarrhea, nausea and vomiting.   Endocrine: Negative for cold intolerance and heat intolerance.   Genitourinary: Positive for frequency. Negative for difficulty urinating and dysuria.   Musculoskeletal: Positive for back pain.   Skin: Negative for rash.   Neurological: Negative for dizziness, seizures, weakness, light-headedness and headaches.   Hematological: Negative for adenopathy. Does not bruise/bleed easily.   Psychiatric/Behavioral: Negative for agitation and behavioral problems. The patient is not nervous/anxious.        Objective:        BP (!) 149/90   Pulse 84   Temp 97.2 °F (36.2 °C)   Resp 18   Wt (!) 147.7 kg (325 lb 11.2 oz)   BMI 42.97 kg/m²     Physical Exam  Vitals signs reviewed.   Constitutional:       Appearance: He is well-developed.   HENT:      Head: Normocephalic and atraumatic.      Right Ear: External ear normal.      Left Ear: External ear normal.   Eyes:      General: No scleral icterus.     Conjunctiva/sclera: Conjunctivae normal.      Pupils: Pupils are equal, round, and reactive to light.   Neck:      Musculoskeletal: Normal range of motion and neck supple.   Cardiovascular:      Rate and Rhythm: Normal rate and regular rhythm.      Heart sounds: Normal heart  sounds. No murmur. No friction rub. No gallop.    Pulmonary:      Effort: Pulmonary effort is normal. No respiratory distress.      Breath sounds: Normal breath sounds. No rales.   Chest:      Chest wall: No tenderness.   Abdominal:      General: Bowel sounds are normal. There is no distension.      Palpations: Abdomen is soft. There is no mass.      Tenderness: There is no abdominal tenderness. There is no guarding or rebound.   Lymphadenopathy:      Head:      Right side of head: No tonsillar adenopathy.      Left side of head: No tonsillar adenopathy.      Cervical: No cervical adenopathy.      Upper Body:      Right upper body: No supraclavicular adenopathy.      Left upper body: No supraclavicular adenopathy.   Neurological:      Mental Status: He is alert and oriented to person, place, and time.   Psychiatric:         Behavior: Behavior normal.         Thought Content: Thought content normal.         Judgment: Judgment normal.           Lab  No results found for this or any previous visit (from the past 336 hour(s)).  CMP  Sodium   Date Value Ref Range Status   12/01/2019 129 (L) 136 - 145 mmol/L Final     Potassium   Date Value Ref Range Status   12/01/2019 4.9 3.5 - 5.1 mmol/L Final     Chloride   Date Value Ref Range Status   12/01/2019 90 (L) 95 - 110 mmol/L Final     CO2   Date Value Ref Range Status   12/01/2019 31 (H) 23 - 29 mmol/L Final     Glucose   Date Value Ref Range Status   12/01/2019 115 (H) 70 - 110 mg/dL Final     BUN, Bld   Date Value Ref Range Status   12/01/2019 12 8 - 23 mg/dL Final     Creatinine   Date Value Ref Range Status   12/01/2019 0.9 0.5 - 1.4 mg/dL Final     Calcium   Date Value Ref Range Status   12/01/2019 9.1 8.7 - 10.5 mg/dL Final     Total Protein   Date Value Ref Range Status   12/01/2019 7.5 6.0 - 8.4 g/dL Final     Albumin   Date Value Ref Range Status   12/01/2019 4.0 3.5 - 5.2 g/dL Final     Total Bilirubin   Date Value Ref Range Status   12/01/2019 0.9 0.1 - 1.0  mg/dL Final     Comment:     For infants and newborns, interpretation of results should be based  on gestational age, weight and in agreement with clinical  observations.  Premature Infant recommended reference ranges:  Up to 24 hours.............<8.0 mg/dL  Up to 48 hours............<12.0 mg/dL  3-5 days..................<15.0 mg/dL  6-29 days.................<15.0 mg/dL       Alkaline Phosphatase   Date Value Ref Range Status   12/01/2019 59 55 - 135 U/L Final     AST   Date Value Ref Range Status   12/01/2019 27 10 - 40 U/L Final     ALT   Date Value Ref Range Status   12/01/2019 21 10 - 44 U/L Final     Anion Gap   Date Value Ref Range Status   12/01/2019 8 8 - 16 mmol/L Final     eGFR if    Date Value Ref Range Status   12/01/2019 >60.0 >60 mL/min/1.73 m^2 Final     eGFR if non    Date Value Ref Range Status   12/01/2019 >60.0 >60 mL/min/1.73 m^2 Final     Comment:     Calculation used to obtain the estimated glomerular filtration  rate (eGFR) is the CKD-EPI equation.            Specimen (12h ago, onward)    None                All lab results and imaging results have been reviewed and discussed with the patient.     Assessment:       1. History of kidney cancer      Problem List Items Addressed This Visit     History of kidney cancer     Patient has not had evaluation for recurrence in many years and is having increasing back pain.  I feel he needs imaging of the chest/a/p and labs with LDH.  If he is negative then I don't feel continued imaging will be needed and discussed this today.           Relevant Orders    CT Chest Abdomen Pelvis With Contrast    CBC auto differential    Comprehensive metabolic panel    Lactate Dehydrogenase        Cancer Staging  No matching staging information was found for the patient.      Plan:         Follow up in about 2 weeks (around 8/27/2020).       The plan was discussed with the patient and all questions/concerns have been answered to the  patient's satisfaction.

## 2020-08-13 NOTE — ASSESSMENT & PLAN NOTE
Patient has not had evaluation for recurrence in many years and is having increasing back pain.  I feel he needs imaging of the chest/a/p and labs with LDH.  If he is negative then I don't feel continued imaging will be needed and discussed this today.

## 2020-09-01 ENCOUNTER — HOSPITAL ENCOUNTER (OUTPATIENT)
Dept: RADIOLOGY | Facility: HOSPITAL | Age: 70
Discharge: HOME | End: 2020-09-01
Attending: INTERNAL MEDICINE
Payer: MEDICARE

## 2020-09-01 DIAGNOSIS — Z85.528 HISTORY OF KIDNEY CANCER: ICD-10-CM

## 2020-09-01 LAB
CREAT SERPL-MCNC: 1.1 MG/DL (ref 0.5–1.4)
SAMPLE: NORMAL

## 2020-09-01 PROCEDURE — 74177 CT ABD & PELVIS W/CONTRAST: CPT | Mod: TC,PO

## 2020-09-01 PROCEDURE — 25500020 PHARM REV CODE 255: Mod: PO | Performed by: INTERNAL MEDICINE

## 2020-09-01 RX ADMIN — IOHEXOL 100 ML: 350 INJECTION, SOLUTION INTRAVENOUS at 03:09

## 2020-09-08 ENCOUNTER — LAB VISIT (OUTPATIENT)
Dept: LAB | Facility: HOSPITAL | Age: 70
End: 2020-09-08
Attending: INTERNAL MEDICINE
Payer: MEDICARE

## 2020-09-08 DIAGNOSIS — R60.9 EDEMA: ICD-10-CM

## 2020-09-08 DIAGNOSIS — I51.1 RUPTURE OF CHORDAE TENDINEAE: ICD-10-CM

## 2020-09-08 DIAGNOSIS — Z85.528 HISTORY OF KIDNEY CANCER: ICD-10-CM

## 2020-09-08 DIAGNOSIS — E78.5 HYPERLIPEMIA: Primary | ICD-10-CM

## 2020-09-08 DIAGNOSIS — R73.09 IMPAIRED GLUCOSE TOLERANCE TEST: ICD-10-CM

## 2020-09-08 DIAGNOSIS — E78.5 HYPERLIPEMIA: ICD-10-CM

## 2020-09-08 LAB
ALBUMIN SERPL BCP-MCNC: 4 G/DL (ref 3.5–5.2)
ALP SERPL-CCNC: 65 U/L (ref 55–135)
ALT SERPL W/O P-5'-P-CCNC: 25 U/L (ref 10–44)
ANION GAP SERPL CALC-SCNC: 11 MMOL/L (ref 8–16)
AST SERPL-CCNC: 27 U/L (ref 10–40)
BASOPHILS # BLD AUTO: 0.11 K/UL (ref 0–0.2)
BASOPHILS NFR BLD: 1.3 % (ref 0–1.9)
BILIRUB SERPL-MCNC: 0.8 MG/DL (ref 0.1–1)
BUN SERPL-MCNC: 9 MG/DL (ref 8–23)
CALCIUM SERPL-MCNC: 9.2 MG/DL (ref 8.7–10.5)
CHLORIDE SERPL-SCNC: 91 MMOL/L (ref 95–110)
CHOLEST SERPL-MCNC: 143 MG/DL (ref 120–199)
CHOLEST/HDLC SERPL: 2.7 {RATIO} (ref 2–5)
CO2 SERPL-SCNC: 28 MMOL/L (ref 23–29)
CREAT SERPL-MCNC: 1 MG/DL (ref 0.5–1.4)
DIFFERENTIAL METHOD: ABNORMAL
EOSINOPHIL # BLD AUTO: 0.3 K/UL (ref 0–0.5)
EOSINOPHIL NFR BLD: 3.9 % (ref 0–8)
ERYTHROCYTE [DISTWIDTH] IN BLOOD BY AUTOMATED COUNT: 12.9 % (ref 11.5–14.5)
EST. GFR  (AFRICAN AMERICAN): >60 ML/MIN/1.73 M^2
EST. GFR  (NON AFRICAN AMERICAN): >60 ML/MIN/1.73 M^2
GLUCOSE SERPL-MCNC: 99 MG/DL (ref 70–110)
HCT VFR BLD AUTO: 43.7 % (ref 40–54)
HDLC SERPL-MCNC: 53 MG/DL (ref 40–75)
HDLC SERPL: 37.1 % (ref 20–50)
HGB BLD-MCNC: 14 G/DL (ref 14–18)
IMM GRANULOCYTES # BLD AUTO: 0.03 K/UL (ref 0–0.04)
IMM GRANULOCYTES NFR BLD AUTO: 0.3 % (ref 0–0.5)
LDH SERPL L TO P-CCNC: 108 U/L (ref 110–260)
LDLC SERPL CALC-MCNC: 71.6 MG/DL (ref 63–159)
LYMPHOCYTES # BLD AUTO: 2.2 K/UL (ref 1–4.8)
LYMPHOCYTES NFR BLD: 25.6 % (ref 18–48)
MAGNESIUM SERPL-MCNC: 1.9 MG/DL (ref 1.6–2.6)
MCH RBC QN AUTO: 28.1 PG (ref 27–31)
MCHC RBC AUTO-ENTMCNC: 32 G/DL (ref 32–36)
MCV RBC AUTO: 88 FL (ref 82–98)
MONOCYTES # BLD AUTO: 0.7 K/UL (ref 0.3–1)
MONOCYTES NFR BLD: 7.9 % (ref 4–15)
NEUTROPHILS # BLD AUTO: 5.3 K/UL (ref 1.8–7.7)
NEUTROPHILS NFR BLD: 61 % (ref 38–73)
NONHDLC SERPL-MCNC: 90 MG/DL
NRBC BLD-RTO: 0 /100 WBC
PLATELET # BLD AUTO: 257 K/UL (ref 150–350)
PMV BLD AUTO: 8.1 FL (ref 9.2–12.9)
POTASSIUM SERPL-SCNC: 4.4 MMOL/L (ref 3.5–5.1)
PROT SERPL-MCNC: 7.5 G/DL (ref 6–8.4)
RBC # BLD AUTO: 4.99 M/UL (ref 4.6–6.2)
SODIUM SERPL-SCNC: 130 MMOL/L (ref 136–145)
T4 FREE SERPL-MCNC: 0.77 NG/DL (ref 0.71–1.51)
TRIGL SERPL-MCNC: 92 MG/DL (ref 30–150)
TSH SERPL DL<=0.005 MIU/L-ACNC: 9.44 UIU/ML (ref 0.34–5.6)
WBC # BLD AUTO: 8.7 K/UL (ref 3.9–12.7)

## 2020-09-08 PROCEDURE — 83615 LACTATE (LD) (LDH) ENZYME: CPT

## 2020-09-08 PROCEDURE — 80053 COMPREHEN METABOLIC PANEL: CPT

## 2020-09-08 PROCEDURE — 80061 LIPID PANEL: CPT

## 2020-09-08 PROCEDURE — 83735 ASSAY OF MAGNESIUM: CPT

## 2020-09-08 PROCEDURE — 84443 ASSAY THYROID STIM HORMONE: CPT

## 2020-09-08 PROCEDURE — 85025 COMPLETE CBC W/AUTO DIFF WBC: CPT

## 2020-09-08 PROCEDURE — 36415 COLL VENOUS BLD VENIPUNCTURE: CPT

## 2020-09-08 PROCEDURE — 84439 ASSAY OF FREE THYROXINE: CPT

## 2020-09-08 PROCEDURE — 83036 HEMOGLOBIN GLYCOSYLATED A1C: CPT

## 2020-09-09 ENCOUNTER — OFFICE VISIT (OUTPATIENT)
Dept: CARDIOLOGY | Facility: CLINIC | Age: 70
End: 2020-09-09
Payer: MEDICARE

## 2020-09-09 VITALS
BODY MASS INDEX: 41.75 KG/M2 | HEIGHT: 73 IN | DIASTOLIC BLOOD PRESSURE: 82 MMHG | WEIGHT: 315 LBS | SYSTOLIC BLOOD PRESSURE: 134 MMHG | OXYGEN SATURATION: 96 % | HEART RATE: 76 BPM | RESPIRATION RATE: 16 BRPM

## 2020-09-09 DIAGNOSIS — R00.1 SINUS BRADYCARDIA, PERSISTENT: ICD-10-CM

## 2020-09-09 DIAGNOSIS — Z95.0 CARDIAC PACEMAKER IN SITU: ICD-10-CM

## 2020-09-09 DIAGNOSIS — I10 ESSENTIAL HYPERTENSION, BENIGN: Primary | ICD-10-CM

## 2020-09-09 LAB
ESTIMATED AVG GLUCOSE: 114 MG/DL (ref 68–131)
HBA1C MFR BLD HPLC: 5.6 % (ref 4.5–6.2)

## 2020-09-09 PROCEDURE — 1125F PR PAIN SEVERITY QUANTIFIED, PAIN PRESENT: ICD-10-PCS | Mod: S$GLB,,, | Performed by: INTERNAL MEDICINE

## 2020-09-09 PROCEDURE — 1159F PR MEDICATION LIST DOCUMENTED IN MEDICAL RECORD: ICD-10-PCS | Mod: S$GLB,,, | Performed by: INTERNAL MEDICINE

## 2020-09-09 PROCEDURE — 3008F BODY MASS INDEX DOCD: CPT | Mod: CPTII,S$GLB,, | Performed by: INTERNAL MEDICINE

## 2020-09-09 PROCEDURE — 3079F PR MOST RECENT DIASTOLIC BLOOD PRESSURE 80-89 MM HG: ICD-10-PCS | Mod: CPTII,S$GLB,, | Performed by: INTERNAL MEDICINE

## 2020-09-09 PROCEDURE — 3079F DIAST BP 80-89 MM HG: CPT | Mod: CPTII,S$GLB,, | Performed by: INTERNAL MEDICINE

## 2020-09-09 PROCEDURE — 93000 PR ELECTROCARDIOGRAM, COMPLETE: ICD-10-PCS | Mod: S$GLB,,, | Performed by: INTERNAL MEDICINE

## 2020-09-09 PROCEDURE — 99499 UNLISTED E&M SERVICE: CPT | Mod: S$GLB,,, | Performed by: INTERNAL MEDICINE

## 2020-09-09 PROCEDURE — 1101F PT FALLS ASSESS-DOCD LE1/YR: CPT | Mod: CPTII,S$GLB,, | Performed by: INTERNAL MEDICINE

## 2020-09-09 PROCEDURE — 3008F PR BODY MASS INDEX (BMI) DOCUMENTED: ICD-10-PCS | Mod: CPTII,S$GLB,, | Performed by: INTERNAL MEDICINE

## 2020-09-09 PROCEDURE — 3075F SYST BP GE 130 - 139MM HG: CPT | Mod: CPTII,S$GLB,, | Performed by: INTERNAL MEDICINE

## 2020-09-09 PROCEDURE — 99213 PR OFFICE/OUTPT VISIT, EST, LEVL III, 20-29 MIN: ICD-10-PCS | Mod: 25,S$GLB,, | Performed by: INTERNAL MEDICINE

## 2020-09-09 PROCEDURE — 99499 RISK ADDL DX/OHS AUDIT: ICD-10-PCS | Mod: S$GLB,,, | Performed by: INTERNAL MEDICINE

## 2020-09-09 PROCEDURE — 1101F PR PT FALLS ASSESS DOC 0-1 FALLS W/OUT INJ PAST YR: ICD-10-PCS | Mod: CPTII,S$GLB,, | Performed by: INTERNAL MEDICINE

## 2020-09-09 PROCEDURE — 93000 ELECTROCARDIOGRAM COMPLETE: CPT | Mod: S$GLB,,, | Performed by: INTERNAL MEDICINE

## 2020-09-09 PROCEDURE — 1159F MED LIST DOCD IN RCRD: CPT | Mod: S$GLB,,, | Performed by: INTERNAL MEDICINE

## 2020-09-09 PROCEDURE — 1125F AMNT PAIN NOTED PAIN PRSNT: CPT | Mod: S$GLB,,, | Performed by: INTERNAL MEDICINE

## 2020-09-09 PROCEDURE — 99213 OFFICE O/P EST LOW 20 MIN: CPT | Mod: 25,S$GLB,, | Performed by: INTERNAL MEDICINE

## 2020-09-09 PROCEDURE — 3075F PR MOST RECENT SYSTOLIC BLOOD PRESS GE 130-139MM HG: ICD-10-PCS | Mod: CPTII,S$GLB,, | Performed by: INTERNAL MEDICINE

## 2020-09-09 NOTE — PROGRESS NOTES
Subjective:    Patient ID:  Jarrett Blanco is a 70 y.o. male who presents for   Follow-up (blodwork done@ [facility])    HPI his main complaint today is his back pain.  Despite for surgeries he continues to have back pain and tells me frequently he takes Advil or Aleve.  He tells me he has cut back beer consumption to no more than 2 a day.  He has some dizziness bending forwards.  He does not check his blood pressures but other doctor's offices it has been in the normal range.  He denies any exertional angina.  He is not very active due to back pain.    Review of patient's allergies indicates:  No Known Allergies    Past Medical History:   Diagnosis Date    Cancer     Kidney - right nephrectomy    Coronary artery disease     Hypertension     Pacemaker      Past Surgical History:   Procedure Laterality Date    BONE MARROW ASPIRATION N/A 9/10/2018    Procedure: ASPIRATION, BONE MARROW;  Surgeon: Larry An MD;  Location: [facility] OR [address] [place] [address];  Service: Neurosurgery;  Laterality: N/A;    CARDIAC PACEMAKER PLACEMENT      EXTREME LATERAL INTERBODY FUSION (XLIF) OF SPINE Left 9/10/2018    Procedure: Left L3-4 and L4-5 XLIF and MIS TLIF L5-S1 with L3-S1 perc instrumentation;  Surgeon: Larry An MD;  Location: [facility] OR [address] [place] [address];  Service: Neurosurgery;  Laterality: Left;  Length: 4-6 hours  Stay: 2-3 days  Brace: LSO  NeuroMonitoring: EMG, SEP  Bed: 1) Regular slider bed reversed 2) [place] [address] post  Position: 1) Lateral right side down 2) Prone  Vendor: [employer] (Merly Abbasi)    LAMINECTOMY  4/22/15    L4-S1 laminoforaminotomy    NEPHRECTOMY Right     SPINE SURGERY  1975    lumbar     Social History     Tobacco Use    Smoking status: Never Smoker    Smokeless tobacco: Current User   Substance Use Topics    Alcohol use: Yes     Comment: beer most days    Drug use: Not on file        Review of Systems     Review of Systems   Constitution: Negative for weight gain and weight loss.   HENT: Negative for congestion,  nosebleeds and sore throat.    Eyes: Negative for visual disturbance.   Cardiovascular: Negative for chest pain, dyspnea on exertion, palpitations and syncope.   Respiratory: Negative for cough, shortness of breath and wheezing.    Skin: Negative for rash.   Musculoskeletal: Positive for arthritis and back pain. Negative for gout, joint pain and myalgias.   Gastrointestinal: Negative for heartburn, hematochezia and nausea.   Genitourinary: Negative for frequency, hematuria and nocturia.   Neurological: Positive for light-headedness. Negative for dizziness and tremors.   Psychiatric/Behavioral: Negative for memory loss.   Allergic/Immunologic: Negative for environmental allergies (Seasonal Allergies).           Objective:        Vitals:    09/09/20 1225   BP: 134/82   Pulse: 76   Resp: 16       Lab Results   Component Value Date    WBC 8.70 09/08/2020    HGB 14.0 09/08/2020    HCT 43.7 09/08/2020     09/08/2020    CHOL 143 09/08/2020    TRIG 92 09/08/2020    HDL 53 09/08/2020    ALT 25 09/08/2020    AST 27 09/08/2020     (L) 09/08/2020    K 4.4 09/08/2020    CL 91 (L) 09/08/2020    CREATININE 1.0 09/08/2020    BUN 9 09/08/2020    CO2 28 09/08/2020    TSH 9.440 (H) 09/08/2020    PSA 0.71 03/25/2009    INR 1.1 12/01/2019    HGBA1C 5.6 09/08/2020        ECHOCARDIOGRAM RESULTS  No results found for this or any previous visit.    CURRENT/PREVIOUS VISIT EKG  Results for orders placed or performed in visit on 09/09/20   IN OFFICE EKG 12-LEAD (to [place])    Collection Time: 09/09/20 12:37 PM    Narrative    Test Reason : Z95.0,R00.1,    Vent. Rate : 073 BPM     Atrial Rate : 073 BPM     P-R Int : 192 ms          QRS Dur : 132 ms      QT Int : 432 ms       P-R-T Axes : 007 010 020 degrees     QTc Int : 475 ms    Normal sinus rhythm  Nonspecific intraventricular block  Abnormal ECG  When compared with ECG of 20-AUG-2018 10:43,  Nonspecific T wave abnormality now evident in Inferior leads    Referred By:              Confirmed By:      No results found for this or any previous visit.  No results found for this or any previous visit.    PHYSICAL EXAM    Physical Exam   Constitutional: He is oriented to person, place, and time.   obese   Neck: Carotid bruit is not present.   Cardiovascular: Normal rate, regular rhythm and normal heart sounds.   No murmur heard.  Pulmonary/Chest: Effort normal and breath sounds normal.   Neurological: He is alert and oriented to person, place, and time.   Skin: Skin is warm and dry.        Medication List with Changes/Refills   Current Medications    ACETAMINOPHEN (TYLENOL) 500 MG TABLET    Take 2 tablets (1,000 mg total) by mouth every 8 (eight) hours as needed for Pain.    ACETAMINOPHEN-CODEINE 300-30MG (TYLENOL #3) 300-30 MG TAB    Take 1 tablet by mouth every 6 (six) hours as needed. Maximum dose of acetaminophen is 3000 mg from all sources in 24 hours, 2000 mg in hepatic failure patients    ACETAMINOPHEN/DIPHENHYDRAMINE (TYLENOL PM ORAL)    Take 1 tablet by mouth once.    ATENOLOL (TENORMIN) 25 MG TABLET    Take 25 mg by mouth 2 (two) times daily.    BENICAR 20 MG TABLET    Take 20 mg by mouth every morning.     GABAPENTIN (NEURONTIN) 300 MG CAPSULE    Take 1 capsule (300 mg total) by mouth 3 (three) times daily.    HYDROCHLOROTHIAZIDE (MICROZIDE) 12.5 MG CAPSULE    Take 12.5 mg by mouth every morning.     IBUPROFEN (ADVIL,MOTRIN) 200 MG TABLET    Take 200 mg by mouth every 6 (six) hours as needed for Pain.    UBIDECARENONE (CO Q-10 ORAL)    Take 1 tablet by mouth daily as needed.           Assessment:       1. Essential hypertension, benign    2. Cardiac pacemaker in situ    3. Sinus bradycardia, persistent         Plan:  His blood pressure is well controlled.  He has occasional dizziness and according to my records his pacemaker has not been interrogated since December of last year in the office.  I will schedule him for a in office pacemaker check.  I have advised him not to take Advil or  ibuprofen because of his single kidney status.  I suggested that he see neurosurgeon for evaluation of his back pain.  I gave name of Dr. Horace Manuel.  Will see him here for follow-up visit in 6 months       Problem List Items Addressed This Visit        Cardiac/Vascular    Essential hypertension, benign - Primary    Cardiac pacemaker in situ    Relevant Orders    IN OFFICE EKG 12-LEAD (to [facility])    Sinus bradycardia, persistent    Relevant Orders    IN OFFICE EKG 12-LEAD (to [facility])    Cardiac device check - [facility]           Follow up in about 6 months (around 3/9/2021) for Routine follow up.

## 2020-09-17 ENCOUNTER — HOSPITAL ENCOUNTER (OUTPATIENT)
Dept: CARDIOLOGY | Facility: CLINIC | Age: 70
Discharge: HOME | End: 2020-09-17
Attending: INTERNAL MEDICINE
Payer: MEDICARE

## 2020-09-17 DIAGNOSIS — R00.1 SINUS BRADYCARDIA, PERSISTENT: ICD-10-CM

## 2020-09-17 PROCEDURE — 93280 CARDIAC DEVICE CHECK - IN CLINIC & HOSPITAL: ICD-10-PCS | Mod: S$GLB,,, | Performed by: INTERNAL MEDICINE

## 2020-09-17 PROCEDURE — 93280 PM DEVICE PROGR EVAL DUAL: CPT | Mod: S$GLB,,, | Performed by: INTERNAL MEDICINE

## 2020-09-21 ENCOUNTER — OFFICE VISIT (OUTPATIENT)
Dept: HEMATOLOGY/ONCOLOGY | Facility: CLINIC | Age: 70
End: 2020-09-21
Payer: MEDICARE

## 2020-09-21 VITALS
HEART RATE: 90 BPM | WEIGHT: 315 LBS | TEMPERATURE: 98 F | RESPIRATION RATE: 19 BRPM | BODY MASS INDEX: 42.73 KG/M2 | DIASTOLIC BLOOD PRESSURE: 90 MMHG | SYSTOLIC BLOOD PRESSURE: 121 MMHG

## 2020-09-21 DIAGNOSIS — Z85.528 HISTORY OF RENAL CELL CARCINOMA: ICD-10-CM

## 2020-09-21 PROCEDURE — 3008F BODY MASS INDEX DOCD: CPT | Mod: S$GLB,,, | Performed by: INTERNAL MEDICINE

## 2020-09-21 PROCEDURE — 3008F PR BODY MASS INDEX (BMI) DOCUMENTED: ICD-10-PCS | Mod: S$GLB,,, | Performed by: INTERNAL MEDICINE

## 2020-09-21 PROCEDURE — 99213 PR OFFICE/OUTPT VISIT, EST, LEVL III, 20-29 MIN: ICD-10-PCS | Mod: S$GLB,,, | Performed by: INTERNAL MEDICINE

## 2020-09-21 PROCEDURE — 3074F PR MOST RECENT SYSTOLIC BLOOD PRESSURE < 130 MM HG: ICD-10-PCS | Mod: S$GLB,,, | Performed by: INTERNAL MEDICINE

## 2020-09-21 PROCEDURE — 1159F MED LIST DOCD IN RCRD: CPT | Mod: S$GLB,,, | Performed by: INTERNAL MEDICINE

## 2020-09-21 PROCEDURE — 3074F SYST BP LT 130 MM HG: CPT | Mod: S$GLB,,, | Performed by: INTERNAL MEDICINE

## 2020-09-21 PROCEDURE — 1125F AMNT PAIN NOTED PAIN PRSNT: CPT | Mod: S$GLB,,, | Performed by: INTERNAL MEDICINE

## 2020-09-21 PROCEDURE — 1101F PT FALLS ASSESS-DOCD LE1/YR: CPT | Mod: S$GLB,,, | Performed by: INTERNAL MEDICINE

## 2020-09-21 PROCEDURE — 3080F DIAST BP >= 90 MM HG: CPT | Mod: S$GLB,,, | Performed by: INTERNAL MEDICINE

## 2020-09-21 PROCEDURE — 99213 OFFICE O/P EST LOW 20 MIN: CPT | Mod: S$GLB,,, | Performed by: INTERNAL MEDICINE

## 2020-09-21 PROCEDURE — 1159F PR MEDICATION LIST DOCUMENTED IN MEDICAL RECORD: ICD-10-PCS | Mod: S$GLB,,, | Performed by: INTERNAL MEDICINE

## 2020-09-21 PROCEDURE — 1101F PR PT FALLS ASSESS DOC 0-1 FALLS W/OUT INJ PAST YR: ICD-10-PCS | Mod: S$GLB,,, | Performed by: INTERNAL MEDICINE

## 2020-09-21 PROCEDURE — 1125F PR PAIN SEVERITY QUANTIFIED, PAIN PRESENT: ICD-10-PCS | Mod: S$GLB,,, | Performed by: INTERNAL MEDICINE

## 2020-09-21 PROCEDURE — 3080F PR MOST RECENT DIASTOLIC BLOOD PRESSURE >= 90 MM HG: ICD-10-PCS | Mod: S$GLB,,, | Performed by: INTERNAL MEDICINE

## 2020-09-21 NOTE — ASSESSMENT & PLAN NOTE
Patient is doing well from this standpoint and scans show no sign of recurrence.  Labs area also unremarkable as well.  I feel that he can follow up on an as needed basis and discussed this today.

## 2020-09-21 NOTE — PROGRESS NOTES
PROGRESS NOTE    Subjective:       Patient ID: Jarrett Blanco is a 70 y.o. male.    Chief Complaint:  No chief complaint on file.  Renal cell cancer follow up.     History of Present Illness:   Jarrett Blanco is a 70 y.o. male who presents for follow up of renal cell cancer.      Patient has no new complaints at this time.      9/1/2020:  CT chest/a/p negative    Family and Social history reviewed and is unchanged from 8/13/2020      ROS:  Review of Systems   Constitutional: Negative for fever and unexpected weight change.   HENT: Negative for nosebleeds.    Respiratory: Negative for chest tightness and shortness of breath.    Cardiovascular: Negative for chest pain.   Gastrointestinal: Negative for abdominal pain and blood in stool.   Genitourinary: Negative for hematuria.   Skin: Negative for rash.   Hematological: Does not bruise/bleed easily.          Current Outpatient Medications:     atenolol (TENORMIN) 25 MG tablet, Take 25 mg by mouth 2 (two) times daily., Disp: , Rfl:     BENICAR 20 mg tablet, Take 20 mg by mouth every morning. , Disp: , Rfl:     hydroCHLOROthiazide (MICROZIDE) 12.5 mg capsule, Take 12.5 mg by mouth every morning. , Disp: , Rfl:     ibuprofen (ADVIL,MOTRIN) 200 MG tablet, Take 200 mg by mouth every 6 (six) hours as needed for Pain., Disp: , Rfl:     ubidecarenone (CO Q-10 ORAL), Take 1 tablet by mouth daily as needed., Disp: , Rfl:     acetaminophen (TYLENOL) 500 MG tablet, Take 2 tablets (1,000 mg total) by mouth every 8 (eight) hours as needed for Pain. (Patient not taking: Reported on 9/9/2020), Disp: 60 tablet, Rfl: 0    acetaminophen-codeine 300-30mg (TYLENOL #3) 300-30 mg Tab, Take 1 tablet by mouth every 6 (six) hours as needed. Maximum dose of acetaminophen is 3000 mg from all sources in 24 hours, 2000 mg in hepatic failure patients, Disp: 30 tablet, Rfl: 0    acetaminophen/diphenhydramine (TYLENOL PM ORAL), Take 1  tablet by mouth once., Disp: , Rfl:     gabapentin (NEURONTIN) 300 MG capsule, Take 1 capsule (300 mg total) by mouth 3 (three) times daily., Disp: 90 capsule, Rfl: 11        Objective:       Physical Examination:     BP (!) 121/90   Pulse 90   Temp 97.9 °F (36.6 °C)   Resp 19   Wt (!) 146.9 kg (323 lb 14.4 oz)   BMI 42.73 kg/m²     Physical Exam  Vitals signs reviewed.   Constitutional:       Appearance: He is well-developed.   HENT:      Head: Normocephalic and atraumatic.      Right Ear: External ear normal.      Left Ear: External ear normal.   Eyes:      General: No scleral icterus.     Conjunctiva/sclera: Conjunctivae normal.      Pupils: Pupils are equal, round, and reactive to light.   Neck:      Musculoskeletal: Normal range of motion and neck supple.   Cardiovascular:      Rate and Rhythm: Normal rate and regular rhythm.      Heart sounds: Normal heart sounds. No murmur. No friction rub. No gallop.    Pulmonary:      Effort: Pulmonary effort is normal. No respiratory distress.      Breath sounds: Normal breath sounds. No rales.   Chest:      Chest wall: No tenderness.   Abdominal:      General: Bowel sounds are normal. There is no distension.      Palpations: Abdomen is soft. There is no mass.      Tenderness: There is no abdominal tenderness. There is no guarding or rebound.   Lymphadenopathy:      Head:      Right side of head: No tonsillar adenopathy.      Left side of head: No tonsillar adenopathy.      Cervical: No cervical adenopathy.      Upper Body:      Right upper body: No supraclavicular adenopathy.      Left upper body: No supraclavicular adenopathy.   Neurological:      Mental Status: He is alert and oriented to person, place, and time.   Psychiatric:         Behavior: Behavior normal.         Thought Content: Thought content normal.         Judgment: Judgment normal.         Labs:   Recent Results (from the past 336 hour(s))   CBC auto differential    Collection Time: 09/08/20  9:11 AM    Result Value Ref Range    WBC 8.70 3.90 - 12.70 K/uL    Hemoglobin 14.0 14.0 - 18.0 g/dL    Hematocrit 43.7 40.0 - 54.0 %    Platelets 257 150 - 350 K/uL     CMP  Sodium   Date Value Ref Range Status   09/08/2020 130 (L) 136 - 145 mmol/L Final     Potassium   Date Value Ref Range Status   09/08/2020 4.4 3.5 - 5.1 mmol/L Final     Chloride   Date Value Ref Range Status   09/08/2020 91 (L) 95 - 110 mmol/L Final     CO2   Date Value Ref Range Status   09/08/2020 28 23 - 29 mmol/L Final     Glucose   Date Value Ref Range Status   09/08/2020 99 70 - 110 mg/dL Final     BUN, Bld   Date Value Ref Range Status   09/08/2020 9 8 - 23 mg/dL Final     Creatinine   Date Value Ref Range Status   09/08/2020 1.0 0.5 - 1.4 mg/dL Final     Calcium   Date Value Ref Range Status   09/08/2020 9.2 8.7 - 10.5 mg/dL Final     Total Protein   Date Value Ref Range Status   09/08/2020 7.5 6.0 - 8.4 g/dL Final     Albumin   Date Value Ref Range Status   09/08/2020 4.0 3.5 - 5.2 g/dL Final     Total Bilirubin   Date Value Ref Range Status   09/08/2020 0.8 0.1 - 1.0 mg/dL Final     Comment:     For infants and newborns, interpretation of results should be based  on gestational age, weight and in agreement with clinical  observations.  Premature Infant recommended reference ranges:  Up to 24 hours.............<8.0 mg/dL  Up to 48 hours............<12.0 mg/dL  3-5 days..................<15.0 mg/dL  6-29 days.................<15.0 mg/dL       Alkaline Phosphatase   Date Value Ref Range Status   09/08/2020 65 55 - 135 U/L Final     AST   Date Value Ref Range Status   09/08/2020 27 10 - 40 U/L Final     ALT   Date Value Ref Range Status   09/08/2020 25 10 - 44 U/L Final     Anion Gap   Date Value Ref Range Status   09/08/2020 11 8 - 16 mmol/L Final     eGFR if    Date Value Ref Range Status   09/08/2020 >60.0 >60 mL/min/1.73 m^2 Final     eGFR if non    Date Value Ref Range Status   09/08/2020 >60.0 >60 mL/min/1.73  m^2 Final     Comment:     Calculation used to obtain the estimated glomerular filtration  rate (eGFR) is the CKD-EPI equation.        No results found for: CEA  Lab Results   Component Value Date    PSA 0.71 03/25/2009    PSA 0.3 01/30/2007           Assessment/Plan:     Problem List Items Addressed This Visit     History of renal cell carcinoma     Patient is doing well from this standpoint and scans show no sign of recurrence.  Labs area also unremarkable as well.  I feel that he can follow up on an as needed basis and discussed this today.                 Discussion:     Follow up if symptoms worsen or fail to improve.      Electronically signed by Deepak Hinson

## 2020-09-24 ENCOUNTER — TELEPHONE (OUTPATIENT)
Dept: CARDIOLOGY | Facility: CLINIC | Age: 70
End: 2020-09-24

## 2020-09-24 NOTE — TELEPHONE ENCOUNTER
----- Message from Angélica Dukes MA sent at 9/17/2020  1:17 PM CDT -----  Regarding: FW: message    ----- Message -----  From: Isaura Yarbrough  Sent: 9/17/2020  10:15 AM CDT  To: [facility] Staff  Subject: message                                          Got an email from my chart about an abnormal EkG and wanted more details     [phone]

## 2020-09-28 ENCOUNTER — TELEPHONE (OUTPATIENT)
Dept: CARDIOLOGY | Facility: CLINIC | Age: 70
End: 2020-09-28

## 2020-09-28 DIAGNOSIS — Z95.0 CARDIAC PACEMAKER IN SITU: Primary | ICD-10-CM

## 2020-09-29 ENCOUNTER — PATIENT MESSAGE (OUTPATIENT)
Dept: OTHER | Facility: OTHER | Age: 70
End: 2020-09-29

## 2020-10-05 ENCOUNTER — PATIENT MESSAGE (OUTPATIENT)
Dept: ADMINISTRATIVE | Facility: HOSPITAL | Age: 70
End: 2020-10-05

## 2020-11-06 ENCOUNTER — PES CALL (OUTPATIENT)
Dept: ADMINISTRATIVE | Facility: CLINIC | Age: 70
End: 2020-11-06

## 2020-11-17 ENCOUNTER — PATIENT OUTREACH (OUTPATIENT)
Dept: ADMINISTRATIVE | Facility: HOSPITAL | Age: 70
End: 2020-11-17

## 2020-11-17 NOTE — LETTER
AUTHORIZATION FOR RELEASE OF   CONFIDENTIAL INFORMATION    Dear DR. FRANCISCO,    We are seeing Jarrett Blanco, date of birth 1950, in the clinic at [facility]. Luis Alfredo Sands Jr, MD is the patient's PCP. Jarrett Blanco has an outstanding lab/procedure at the time we reviewed his chart. In order to help keep his health information updated, he has authorized us to request the following medical record(s):        (  )  MAMMOGRAM                                      ( X )  COLONOSCOPY      (  )  PAP SMEAR                                          (  )  OUTSIDE LAB RESULTS     (  )  DEXA SCAN                                          (  )  EYE EXAM            (  )  FOOT EXAM                                          (  )  ENTIRE RECORD     (  )  OUTSIDE IMMUNIZATIONS                 (  )  _______________         Please fax records to [facility], James H Newcomb Jr, MD, [phone]    Nia Hilario LPN  Clinical Care Coordinator  [address] [address]  P: [phone]  F: [phone]            Patient Name: Jarrett Blanco  : 1950  Patient Phone #: [phone]

## 2020-12-11 ENCOUNTER — PATIENT MESSAGE (OUTPATIENT)
Dept: OTHER | Facility: OTHER | Age: 70
End: 2020-12-11

## 2021-01-04 ENCOUNTER — PATIENT MESSAGE (OUTPATIENT)
Dept: ADMINISTRATIVE | Facility: HOSPITAL | Age: 71
End: 2021-01-04

## 2021-02-11 ENCOUNTER — PATIENT MESSAGE (OUTPATIENT)
Dept: ADMINISTRATIVE | Facility: OTHER | Age: 71
End: 2021-02-11

## 2021-02-11 ENCOUNTER — NURSE TRIAGE (OUTPATIENT)
Dept: ADMINISTRATIVE | Facility: CLINIC | Age: 71
End: 2021-02-11

## 2021-02-11 DIAGNOSIS — U07.1 COVID-19 VIRUS INFECTION: Primary | ICD-10-CM

## 2021-02-12 ENCOUNTER — NURSE TRIAGE (OUTPATIENT)
Dept: ADMINISTRATIVE | Facility: CLINIC | Age: 71
End: 2021-02-12

## 2021-02-12 ENCOUNTER — PATIENT MESSAGE (OUTPATIENT)
Dept: ADMINISTRATIVE | Facility: CLINIC | Age: 71
End: 2021-02-12

## 2021-02-12 ENCOUNTER — INFUSION (OUTPATIENT)
Dept: INFECTIOUS DISEASES | Facility: HOSPITAL | Age: 71
End: 2021-02-12
Attending: FAMILY MEDICINE
Payer: MEDICARE

## 2021-02-12 ENCOUNTER — PATIENT MESSAGE (OUTPATIENT)
Dept: ADMINISTRATIVE | Facility: OTHER | Age: 71
End: 2021-02-12

## 2021-02-12 VITALS
OXYGEN SATURATION: 98 % | BODY MASS INDEX: 41.75 KG/M2 | DIASTOLIC BLOOD PRESSURE: 73 MMHG | TEMPERATURE: 98 F | WEIGHT: 315 LBS | RESPIRATION RATE: 18 BRPM | SYSTOLIC BLOOD PRESSURE: 142 MMHG | HEART RATE: 65 BPM | HEIGHT: 73 IN

## 2021-02-12 DIAGNOSIS — U07.1 COVID-19 VIRUS INFECTION: ICD-10-CM

## 2021-02-12 PROCEDURE — M0243 CASIRIVI AND IMDEVI INFUSION: HCPCS | Performed by: FAMILY MEDICINE

## 2021-02-12 PROCEDURE — 25000003 PHARM REV CODE 250: Performed by: FAMILY MEDICINE

## 2021-02-12 PROCEDURE — 63600175 PHARM REV CODE 636 W HCPCS: Performed by: FAMILY MEDICINE

## 2021-02-12 RX ORDER — SODIUM CHLORIDE 0.9 % (FLUSH) 0.9 %
10 SYRINGE (ML) INJECTION
Status: ACTIVE | OUTPATIENT
Start: 2021-02-12

## 2021-02-12 RX ORDER — ALBUTEROL SULFATE 90 UG/1
2 AEROSOL, METERED RESPIRATORY (INHALATION)
Status: DISPENSED | OUTPATIENT
Start: 2021-02-12

## 2021-02-12 RX ORDER — ONDANSETRON 4 MG/1
4 TABLET, ORALLY DISINTEGRATING ORAL ONCE AS NEEDED
Status: DISPENSED | OUTPATIENT
Start: 2021-02-12 | End: 2032-07-11

## 2021-02-12 RX ORDER — DIPHENHYDRAMINE HYDROCHLORIDE 50 MG/ML
25 INJECTION INTRAMUSCULAR; INTRAVENOUS ONCE AS NEEDED
Status: DISPENSED | OUTPATIENT
Start: 2021-02-12 | End: 2032-07-11

## 2021-02-12 RX ORDER — ACETAMINOPHEN 325 MG/1
650 TABLET ORAL ONCE AS NEEDED
Status: DISPENSED | OUTPATIENT
Start: 2021-02-12 | End: 2032-07-11

## 2021-02-12 RX ORDER — EPINEPHRINE 0.1 MG/ML
0.3 INJECTION INTRAVENOUS
Status: DISPENSED | OUTPATIENT
Start: 2021-02-12

## 2021-02-12 RX ADMIN — SODIUM CHLORIDE: 0.9 INJECTION, SOLUTION INTRAVENOUS at 12:02

## 2021-02-12 RX ADMIN — CASIRIVIMAB: 1332 INJECTION, SOLUTION, CONCENTRATE INTRAVENOUS at 12:02

## 2021-02-13 ENCOUNTER — NURSE TRIAGE (OUTPATIENT)
Dept: ADMINISTRATIVE | Facility: CLINIC | Age: 71
End: 2021-02-13

## 2021-02-13 ENCOUNTER — PATIENT MESSAGE (OUTPATIENT)
Dept: ADMINISTRATIVE | Facility: OTHER | Age: 71
End: 2021-02-13

## 2021-02-14 ENCOUNTER — PATIENT MESSAGE (OUTPATIENT)
Dept: ADMINISTRATIVE | Facility: OTHER | Age: 71
End: 2021-02-14

## 2021-02-15 ENCOUNTER — PATIENT MESSAGE (OUTPATIENT)
Dept: ADMINISTRATIVE | Facility: OTHER | Age: 71
End: 2021-02-15

## 2021-02-16 ENCOUNTER — PATIENT MESSAGE (OUTPATIENT)
Dept: ADMINISTRATIVE | Facility: OTHER | Age: 71
End: 2021-02-16

## 2021-02-16 LAB
IMPEDANCE RA LEAD (NATIVE): 136 OHMS
IMPEDANCE RA LEAD: 195 OHMS
THRESHOLD MS RA LEAD (NATIVE): 0.4 MS
THRESHOLD MS RA LEAD: 0.4 MS
THRESHOLD V RA LEAD (NATIVE): 0.9 V
THRESHOLD V RA LEAD: 4.3 V

## 2021-02-17 ENCOUNTER — PATIENT MESSAGE (OUTPATIENT)
Dept: ADMINISTRATIVE | Facility: OTHER | Age: 71
End: 2021-02-17

## 2021-02-18 ENCOUNTER — PATIENT MESSAGE (OUTPATIENT)
Dept: ADMINISTRATIVE | Facility: OTHER | Age: 71
End: 2021-02-18

## 2021-02-19 ENCOUNTER — PATIENT MESSAGE (OUTPATIENT)
Dept: ADMINISTRATIVE | Facility: OTHER | Age: 71
End: 2021-02-19

## 2021-02-20 ENCOUNTER — PATIENT MESSAGE (OUTPATIENT)
Dept: ADMINISTRATIVE | Facility: OTHER | Age: 71
End: 2021-02-20

## 2021-02-20 ENCOUNTER — NURSE TRIAGE (OUTPATIENT)
Dept: ADMINISTRATIVE | Facility: CLINIC | Age: 71
End: 2021-02-20

## 2021-03-23 ENCOUNTER — PES CALL (OUTPATIENT)
Dept: ADMINISTRATIVE | Facility: CLINIC | Age: 71
End: 2021-03-23

## 2021-04-05 ENCOUNTER — PATIENT MESSAGE (OUTPATIENT)
Dept: ADMINISTRATIVE | Facility: HOSPITAL | Age: 71
End: 2021-04-05

## 2021-05-06 ENCOUNTER — PATIENT MESSAGE (OUTPATIENT)
Dept: RESEARCH | Facility: HOSPITAL | Age: 71
End: 2021-05-06

## 2021-07-06 ENCOUNTER — PATIENT MESSAGE (OUTPATIENT)
Dept: ADMINISTRATIVE | Facility: HOSPITAL | Age: 71
End: 2021-07-06

## 2021-07-06 ENCOUNTER — PES CALL (OUTPATIENT)
Dept: ADMINISTRATIVE | Facility: CLINIC | Age: 71
End: 2021-07-06

## 2021-08-19 ENCOUNTER — HOSPITAL ENCOUNTER (OUTPATIENT)
Dept: CARDIOLOGY | Facility: CLINIC | Age: 71
Discharge: HOME | End: 2021-08-19
Attending: INTERNAL MEDICINE
Payer: MEDICARE

## 2021-08-19 DIAGNOSIS — R00.1 SINUS BRADYCARDIA, PERSISTENT: Primary | ICD-10-CM

## 2021-08-19 DIAGNOSIS — Z95.0 CARDIAC PACEMAKER IN SITU: ICD-10-CM

## 2021-08-19 PROCEDURE — 93280 CARDIAC DEVICE CHECK - IN CLINIC & HOSPITAL: ICD-10-PCS | Mod: S$GLB,,, | Performed by: INTERNAL MEDICINE

## 2021-08-19 PROCEDURE — 93280 PM DEVICE PROGR EVAL DUAL: CPT | Mod: S$GLB,,, | Performed by: INTERNAL MEDICINE

## 2021-09-07 ENCOUNTER — PATIENT OUTREACH (OUTPATIENT)
Dept: ADMINISTRATIVE | Facility: HOSPITAL | Age: 71
End: 2021-09-07

## 2021-10-27 LAB
IMPEDANCE RA LEAD (NATIVE): 195 OHMS
IMPEDANCE RA LEAD: 195 OHMS
P/R-WAVE RA LEAD (NATIVE): 12.9 MV
P/R-WAVE RA LEAD: 1.6 MV
THRESHOLD MS RA LEAD: 0.4 MS
THRESHOLD V RA LEAD: 3.5 V

## 2022-02-22 RX ORDER — OLMESARTAN MEDOXOMIL 20 MG/1
20 TABLET ORAL DAILY
Qty: 30 TABLET | Refills: 0 | Status: SHIPPED | OUTPATIENT
Start: 2022-02-22 | End: 2022-03-18 | Stop reason: SDUPTHER

## 2022-02-22 RX ORDER — ATENOLOL 25 MG/1
25 TABLET ORAL 2 TIMES DAILY
Qty: 60 TABLET | Refills: 0 | Status: SHIPPED | OUTPATIENT
Start: 2022-02-22 | End: 2022-03-18 | Stop reason: SDUPTHER

## 2022-02-22 RX ORDER — HYDROCHLOROTHIAZIDE 12.5 MG/1
12.5 CAPSULE ORAL DAILY
Qty: 30 CAPSULE | Refills: 0 | Status: SHIPPED | OUTPATIENT
Start: 2022-02-22 | End: 2022-03-18 | Stop reason: SDUPTHER

## 2022-02-22 NOTE — TELEPHONE ENCOUNTER
----- Message from Sim Espana sent at 2/22/2022 12:22 PM CST -----  Type:  RX Refill Request    Who Called: pt  Refill or New Rx: ref  RX Name and Strength:atenoloL (TENORMIN) 25 MG tablet,olmesartan (BENICAR) 20 MG tablet,  hydroCHLOROthiazide (MICROZIDE) 12.5 mg capsule  How is the patient currently taking it? (ex. 1XDay):  Is this a 30 day or 90 day RX:  Preferred Pharmacy with phone number:[facility] - [facility] [address] (Ph: [phone])  Local or Mail Order: mail  Ordering Provider: GEOVANNA Mcnally   Would the patient rather a call back or a response via [facility]?   Best Call Back Number:  Additional Information: pt will not be able to attend an appt if it is required, they currently have a highly contagious resp infection that is projected to last 3-4 weeks. Please notify pt if there are any issue in sending for these medication. They are low on supplies

## 2022-03-18 ENCOUNTER — OFFICE VISIT (OUTPATIENT)
Dept: CARDIOLOGY | Facility: CLINIC | Age: 72
End: 2022-03-18
Payer: MEDICARE

## 2022-03-18 VITALS
OXYGEN SATURATION: 92 % | DIASTOLIC BLOOD PRESSURE: 88 MMHG | BODY MASS INDEX: 41.75 KG/M2 | WEIGHT: 315 LBS | SYSTOLIC BLOOD PRESSURE: 136 MMHG | HEIGHT: 73 IN | HEART RATE: 87 BPM

## 2022-03-18 DIAGNOSIS — I49.5 SICK SINUS SYNDROME: Primary | ICD-10-CM

## 2022-03-18 DIAGNOSIS — I10 ESSENTIAL HYPERTENSION, BENIGN: ICD-10-CM

## 2022-03-18 DIAGNOSIS — Z95.0 CARDIAC PACEMAKER IN SITU: ICD-10-CM

## 2022-03-18 DIAGNOSIS — I25.10 CORONARY ARTERY DISEASE INVOLVING NATIVE CORONARY ARTERY OF NATIVE HEART WITHOUT ANGINA PECTORIS: ICD-10-CM

## 2022-03-18 PROCEDURE — 4010F PR ACE/ARB THEARPY RXD/TAKEN: ICD-10-PCS | Mod: CPTII,S$GLB,, | Performed by: INTERNAL MEDICINE

## 2022-03-18 PROCEDURE — 3079F PR MOST RECENT DIASTOLIC BLOOD PRESSURE 80-89 MM HG: ICD-10-PCS | Mod: CPTII,S$GLB,, | Performed by: INTERNAL MEDICINE

## 2022-03-18 PROCEDURE — 99214 OFFICE O/P EST MOD 30 MIN: CPT | Mod: S$GLB,,, | Performed by: INTERNAL MEDICINE

## 2022-03-18 PROCEDURE — 99214 PR OFFICE/OUTPT VISIT, EST, LEVL IV, 30-39 MIN: ICD-10-PCS | Mod: S$GLB,,, | Performed by: INTERNAL MEDICINE

## 2022-03-18 PROCEDURE — 93000 EKG 12-LEAD: ICD-10-PCS | Mod: S$GLB,,, | Performed by: GENERAL PRACTICE

## 2022-03-18 PROCEDURE — 93000 ELECTROCARDIOGRAM COMPLETE: CPT | Mod: S$GLB,,, | Performed by: GENERAL PRACTICE

## 2022-03-18 PROCEDURE — 1125F AMNT PAIN NOTED PAIN PRSNT: CPT | Mod: CPTII,S$GLB,, | Performed by: INTERNAL MEDICINE

## 2022-03-18 PROCEDURE — 1101F PR PT FALLS ASSESS DOC 0-1 FALLS W/OUT INJ PAST YR: ICD-10-PCS | Mod: CPTII,S$GLB,, | Performed by: INTERNAL MEDICINE

## 2022-03-18 PROCEDURE — 3288F FALL RISK ASSESSMENT DOCD: CPT | Mod: CPTII,S$GLB,, | Performed by: INTERNAL MEDICINE

## 2022-03-18 PROCEDURE — 3008F BODY MASS INDEX DOCD: CPT | Mod: CPTII,S$GLB,, | Performed by: INTERNAL MEDICINE

## 2022-03-18 PROCEDURE — 3288F PR FALLS RISK ASSESSMENT DOCUMENTED: ICD-10-PCS | Mod: CPTII,S$GLB,, | Performed by: INTERNAL MEDICINE

## 2022-03-18 PROCEDURE — 3075F SYST BP GE 130 - 139MM HG: CPT | Mod: CPTII,S$GLB,, | Performed by: INTERNAL MEDICINE

## 2022-03-18 PROCEDURE — 1101F PT FALLS ASSESS-DOCD LE1/YR: CPT | Mod: CPTII,S$GLB,, | Performed by: INTERNAL MEDICINE

## 2022-03-18 PROCEDURE — 3079F DIAST BP 80-89 MM HG: CPT | Mod: CPTII,S$GLB,, | Performed by: INTERNAL MEDICINE

## 2022-03-18 PROCEDURE — 99499 RISK ADDL DX/OHS AUDIT: ICD-10-PCS | Mod: S$GLB,,, | Performed by: INTERNAL MEDICINE

## 2022-03-18 PROCEDURE — 4010F ACE/ARB THERAPY RXD/TAKEN: CPT | Mod: CPTII,S$GLB,, | Performed by: INTERNAL MEDICINE

## 2022-03-18 PROCEDURE — 3008F PR BODY MASS INDEX (BMI) DOCUMENTED: ICD-10-PCS | Mod: CPTII,S$GLB,, | Performed by: INTERNAL MEDICINE

## 2022-03-18 PROCEDURE — 1159F PR MEDICATION LIST DOCUMENTED IN MEDICAL RECORD: ICD-10-PCS | Mod: CPTII,S$GLB,, | Performed by: INTERNAL MEDICINE

## 2022-03-18 PROCEDURE — 3075F PR MOST RECENT SYSTOLIC BLOOD PRESS GE 130-139MM HG: ICD-10-PCS | Mod: CPTII,S$GLB,, | Performed by: INTERNAL MEDICINE

## 2022-03-18 PROCEDURE — 99499 UNLISTED E&M SERVICE: CPT | Mod: S$GLB,,, | Performed by: INTERNAL MEDICINE

## 2022-03-18 PROCEDURE — 1159F MED LIST DOCD IN RCRD: CPT | Mod: CPTII,S$GLB,, | Performed by: INTERNAL MEDICINE

## 2022-03-18 PROCEDURE — 1125F PR PAIN SEVERITY QUANTIFIED, PAIN PRESENT: ICD-10-PCS | Mod: CPTII,S$GLB,, | Performed by: INTERNAL MEDICINE

## 2022-03-18 RX ORDER — ATENOLOL 25 MG/1
25 TABLET ORAL 2 TIMES DAILY
Qty: 180 TABLET | Refills: 3 | Status: SHIPPED | OUTPATIENT
Start: 2022-03-18 | End: 2023-02-09

## 2022-03-18 RX ORDER — ATENOLOL 25 MG/1
25 TABLET ORAL 2 TIMES DAILY
Qty: 180 TABLET | Refills: 3 | Status: SHIPPED | OUTPATIENT
Start: 2022-03-18 | End: 2022-03-18

## 2022-03-18 RX ORDER — OLMESARTAN MEDOXOMIL 20 MG/1
20 TABLET ORAL DAILY
Qty: 90 TABLET | Refills: 3 | Status: SHIPPED | OUTPATIENT
Start: 2022-03-18 | End: 2022-04-04 | Stop reason: SDUPTHER

## 2022-03-18 RX ORDER — HYDROCHLOROTHIAZIDE 12.5 MG/1
12.5 CAPSULE ORAL DAILY
Qty: 90 CAPSULE | Refills: 3 | Status: SHIPPED | OUTPATIENT
Start: 2022-03-18 | End: 2022-03-18

## 2022-03-18 RX ORDER — HYDROCHLOROTHIAZIDE 12.5 MG/1
12.5 CAPSULE ORAL DAILY
Qty: 90 CAPSULE | Refills: 3 | Status: SHIPPED | OUTPATIENT
Start: 2022-03-18 | End: 2023-02-09

## 2022-03-18 RX ORDER — OLMESARTAN MEDOXOMIL 20 MG/1
20 TABLET ORAL DAILY
Qty: 90 TABLET | Refills: 3 | Status: SHIPPED | OUTPATIENT
Start: 2022-03-18 | End: 2022-03-18

## 2022-03-18 NOTE — PROGRESS NOTES
[facility] - Cardiology    Subjective:     Patient ID:  Jarrett Blanco is a 71 y.o. male patient here for evaluation Establish Care (New patient for Dr Lo)      HPI:  71-year-old male with history of bradyarrhythmia status post permanent pacemaker, abnormal stress test status post angiogram as per patient without any significant obstructive disease reported here for follow-up.  Patient reports he has been doing well without any episodes of chest pain or shortness of breath.  His activity status is mostly limited by his excessive back pain.  His device was checked last October and it showed no significant arrhythmias.    Review of Systems   All other systems reviewed and are negative.       Past Medical History:   Diagnosis Date    Cancer     Kidney - right nephrectomy    Coronary artery disease     Hypertension     Pacemaker        Past Surgical History:   Procedure Laterality Date    BONE MARROW ASPIRATION N/A 9/10/2018    Procedure: ASPIRATION, BONE MARROW;  Surgeon: Larry An MD;  Location: [facility] OR [address] [place] [address];  Service: Neurosurgery;  Laterality: N/A;    CARDIAC PACEMAKER PLACEMENT      EXTREME LATERAL INTERBODY FUSION (XLIF) OF SPINE Left 9/10/2018    Procedure: Left L3-4 and L4-5 XLIF and MIS TLIF L5-S1 with L3-S1 perc instrumentation;  Surgeon: Larry An MD;  Location: [facility] OR [address] [place] [address];  Service: Neurosurgery;  Laterality: Left;  Length: 4-6 hours  Stay: 2-3 days  Brace: LSO  NeuroMonitoring: EMG, SEP  Bed: 1) Regular slider bed reversed 2) lavonne 4 post  Position: 1) Lateral right side down 2) Prone  Vendor: [employer] (Merly Abbasi)    LAMINECTOMY  4/22/15    L4-S1 laminoforaminotomy    NEPHRECTOMY Right     SPINE SURGERY  1975    lumbar       Family History   Problem Relation Age of Onset    COPD Mother     Cancer Mother     Cancer Father        Social History     Socioeconomic History    Marital status:    Tobacco Use    Smoking status: Never Smoker    Smokeless tobacco: Current  User   Substance and Sexual Activity    Alcohol use: Yes     Comment: beer most days       Current Outpatient Medications   Medication Sig Dispense Refill    pulse oximeter (PULSE OXIMETER) device Use twice daily at 8 AM and 3 PM and record the value in MyChart as directed. 1 each 0    ubidecarenone (CO Q-10 ORAL) Take 1 tablet by mouth daily as needed.      acetaminophen (TYLENOL) 500 MG tablet Take 2 tablets (1,000 mg total) by mouth every 8 (eight) hours as needed for Pain. (Patient not taking: No sig reported) 60 tablet 0    acetaminophen-codeine 300-30mg (TYLENOL #3) 300-30 mg Tab Take 1 tablet by mouth every 6 (six) hours as needed. Maximum dose of acetaminophen is 3000 mg from all sources in 24 hours, 2000 mg in hepatic failure patients (Patient not taking: Reported on 3/18/2022) 30 tablet 0    acetaminophen/diphenhydramine (TYLENOL PM ORAL) Take 1 tablet by mouth once.      atenoloL (TENORMIN) 25 MG tablet Take 1 tablet (25 mg total) by mouth 2 (two) times daily. 180 tablet 3    gabapentin (NEURONTIN) 300 MG capsule Take 1 capsule (300 mg total) by mouth 3 (three) times daily. 90 capsule 11    hydroCHLOROthiazide (MICROZIDE) 12.5 mg capsule Take 1 capsule (12.5 mg total) by mouth once daily. 90 capsule 3    ibuprofen (ADVIL,MOTRIN) 200 MG tablet Take 200 mg by mouth every 6 (six) hours as needed for Pain.      olmesartan (BENICAR) 20 MG tablet Take 1 tablet (20 mg total) by mouth once daily. 90 tablet 3     Current Facility-Administered Medications   Medication Dose Route Frequency Provider Last Rate Last Admin    acetaminophen tablet 650 mg  650 mg Oral Once PRN Luis Alfredo Sands Jr., MD        albuterol inhaler 2 puff  2 puff Inhalation Q20 Min PRN Luis Alfredo Sands Jr., MD        diphenhydrAMINE injection 25 mg  25 mg Intravenous Once PRN Luis Alfredo Sands Jr., MD        EPINEPHrine 0.1 mg/mL injection 0.3 mg  0.3 mg Intramuscular PRN Luis Alfredo Sands Jr., MD        methylPREDNISolone sodium  succinate injection 40 mg  40 mg Intravenous Once PRN Luis Alfredo Sands Jr., MD        ondansetron disintegrating tablet 4 mg  4 mg Oral Once PRN Luis Alfredo Sands Jr., MD        sodium chloride 0.9% 500 mL flush bag   Intravenous PRN Luis Alfredo Sands Jr., MD   Stopped at 02/12/21 1448    sodium chloride 0.9% flush 10 mL  10 mL Intravenous PRN Luis Alfredo Sands Jr., MD           Review of patient's allergies indicates:  No Known Allergies      Objective:        Vitals:    03/18/22 1301   BP: 136/88   Pulse: 87       Physical Exam  Vitals reviewed.   Constitutional:       Appearance: Normal appearance. He is obese.   HENT:      Mouth/Throat:      Mouth: Mucous membranes are moist.   Eyes:      Extraocular Movements: Extraocular movements intact.      Pupils: Pupils are equal, round, and reactive to light.   Cardiovascular:      Rate and Rhythm: Normal rate and regular rhythm.      Pulses: Normal pulses.      Heart sounds: Normal heart sounds. No murmur heard.    No gallop.   Pulmonary:      Effort: Pulmonary effort is normal.      Breath sounds: Normal breath sounds.   Abdominal:      General: Bowel sounds are normal.      Palpations: Abdomen is soft.   Musculoskeletal:         General: Normal range of motion.      Cervical back: Normal range of motion.   Skin:     General: Skin is warm and dry.   Neurological:      General: No focal deficit present.      Mental Status: He is alert and oriented to person, place, and time.   Psychiatric:         Mood and Affect: Mood normal.         LIPIDS - LAST 2   Lab Results   Component Value Date    CHOL 143 09/08/2020    CHOL 172 12/19/2019    HDL 53 09/08/2020    HDL 44 12/19/2019    LDLCALC 71.6 09/08/2020    LDLCALC 103.4 12/19/2019    TRIG 92 09/08/2020    TRIG 123 12/19/2019    CHOLHDL 37.1 09/08/2020    CHOLHDL 25.6 12/19/2019       CBC - LAST 2  Lab Results   Component Value Date    WBC 8.70 09/08/2020    WBC 6.51 12/01/2019    RBC 4.99 09/08/2020    RBC 5.12 12/01/2019     HGB 14.0 09/08/2020    HGB 14.7 12/01/2019    HCT 43.7 09/08/2020    HCT 45.6 12/01/2019    MCV 88 09/08/2020    MCV 89 12/01/2019    MCH 28.1 09/08/2020    MCH 28.7 12/01/2019    MCHC 32.0 09/08/2020    MCHC 32.2 12/01/2019    RDW 12.9 09/08/2020    RDW 13.0 12/01/2019     09/08/2020     12/01/2019    MPV 8.1 (L) 09/08/2020    MPV 8.2 (L) 12/01/2019    GRAN 5.3 09/08/2020    GRAN 61.0 09/08/2020    LYMPH 2.2 09/08/2020    LYMPH 25.6 09/08/2020    MONO 0.7 09/08/2020    MONO 7.9 09/08/2020    BASO 0.11 09/08/2020    BASO 0.05 12/01/2019    NRBC 0 09/08/2020    NRBC 0 12/01/2019       CHEMISTRY & LIVER FUNCTION - LAST 2  Lab Results   Component Value Date     (L) 09/08/2020     (L) 12/01/2019    K 4.4 09/08/2020    K 4.9 12/01/2019    CL 91 (L) 09/08/2020    CL 90 (L) 12/01/2019    CO2 28 09/08/2020    CO2 31 (H) 12/01/2019    ANIONGAP 11 09/08/2020    ANIONGAP 8 12/01/2019    BUN 9 09/08/2020    BUN 12 12/01/2019    CREATININE 1.0 09/08/2020    CREATININE 0.9 12/01/2019    GLU 99 09/08/2020     (H) 12/01/2019    CALCIUM 9.2 09/08/2020    CALCIUM 9.1 12/01/2019    PH 7.359 09/10/2018    MG 1.9 09/08/2020    MG 1.8 12/19/2019    ALBUMIN 4.0 09/08/2020    ALBUMIN 4.0 12/01/2019    PROT 7.5 09/08/2020    PROT 7.5 12/01/2019    ALKPHOS 65 09/08/2020    ALKPHOS 59 12/01/2019    ALT 25 09/08/2020    ALT 21 12/01/2019    AST 27 09/08/2020    AST 27 12/01/2019    BILITOT 0.8 09/08/2020    BILITOT 0.9 12/01/2019        CARDIAC PROFILE - LAST 2  Lab Results   Component Value Date    BNP 33 12/19/2019     (L) 09/08/2020        COAGULATION - LAST 2  Lab Results   Component Value Date    LABPT 13.6 12/01/2019    INR 1.1 12/01/2019    INR 1.0 09/10/2018    APTT 25.4 09/10/2018    APTT 23.9 08/20/2018       ENDOCRINE & PSA - LAST 2  Lab Results   Component Value Date    HGBA1C 5.6 09/08/2020    TSH 9.440 (H) 09/08/2020    PSA 0.71 03/25/2009    PSA 0.3 01/30/2007        ECHOCARDIOGRAM  RESULTS  No results found for this or any previous visit.      CURRENT/PREVIOUS VISIT EKG  Results for orders placed or performed in visit on 09/09/20   IN OFFICE EKG 12-LEAD (to [place])    Collection Time: 09/09/20 12:37 PM    Narrative    Test Reason : Z95.0,R00.1,    Vent. Rate : 073 BPM     Atrial Rate : 073 BPM     P-R Int : 192 ms          QRS Dur : 132 ms      QT Int : 432 ms       P-R-T Axes : 007 010 020 degrees     QTc Int : 475 ms    Normal sinus rhythm  Nonspecific intraventricular block  Abnormal ECG  When compared with ECG of 20-AUG-2018 10:43,  Nonspecific T wave abnormality now evident in Inferior leads  Confirmed by Suze Hutson MD ([id number]) on 9/12/2020 6:24:18 PM    Referred By:             Confirmed By:Suze Hutson MD     No valid procedures specified.   No results found for this or any previous visit.    No valid procedures specified.    EKG today shows normal sinus rhythm with first-degree AV block.    Assessment:       1. Sick sinus syndrome    2. Coronary artery disease involving native coronary artery of native heart without angina pectoris    3. BMI 40.0-44.9, adult    4. Cardiac pacemaker in situ    5. Essential hypertension, benign           Plan:       Sick sinus syndrome    Coronary artery disease involving native coronary artery of native heart without angina pectoris  -     IN OFFICE EKG 12-LEAD (to [place])    BMI 40.0-44.9, adult    Cardiac pacemaker in situ    Essential hypertension, benign    Other orders  -     Discontinue: atenoloL (TENORMIN) 25 MG tablet; Take 1 tablet (25 mg total) by mouth 2 (two) times daily.  Dispense: 180 tablet; Refill: 3  -     Discontinue: hydroCHLOROthiazide (MICROZIDE) 12.5 mg capsule; Take 1 capsule (12.5 mg total) by mouth once daily.  Dispense: 90 capsule; Refill: 3  -     Discontinue: olmesartan (BENICAR) 20 MG tablet; Take 1 tablet (20 mg total) by mouth once daily.  Dispense: 90 tablet; Refill: 3  -     atenoloL (TENORMIN) 25 MG tablet;  Take 1 tablet (25 mg total) by mouth 2 (two) times daily.  Dispense: 180 tablet; Refill: 3  -     hydroCHLOROthiazide (MICROZIDE) 12.5 mg capsule; Take 1 capsule (12.5 mg total) by mouth once daily.  Dispense: 90 capsule; Refill: 3  -     olmesartan (BENICAR) 20 MG tablet; Take 1 tablet (20 mg total) by mouth once daily.  Dispense: 90 tablet; Refill: 3    Appears to be doing well from angina standpoint.  Does not report any chest pain however his activity status is low.  Patient advise that if he develops any chest pain or shortness of breath, he should let us know or come to ER.  No arrhythmias on device check, continue with regular device checks.  Will follow-up patient in December after his next device check.  Hypertension is well controlled at home, patient reports diastolics of 80-85 at home but this likely elevated here.  Will refill all his antihypertensive medications.  Discussed extensively about his weight loss and options.  Advised him about low-calorie diet and regular physical exercise.  Advised him to consider gastric bypass.  Recommend annual lipid panel with primary care along with his other blood work for risk factor management.  Follow up in about 9 months (around 12/18/2022) for HTN, s/p PPM interrogation.          Kaden Lo MD  [facility] - Cardiology

## 2022-03-22 RX ORDER — OLMESARTAN MEDOXOMIL 20 MG/1
20 TABLET ORAL DAILY
Qty: 90 TABLET | Refills: 3 | Status: CANCELLED | OUTPATIENT
Start: 2022-03-22 | End: 2023-03-22

## 2022-03-22 RX ORDER — ATENOLOL 25 MG/1
25 TABLET ORAL 2 TIMES DAILY
Qty: 180 TABLET | Refills: 3 | Status: CANCELLED | OUTPATIENT
Start: 2022-03-22 | End: 2023-03-22

## 2022-03-22 RX ORDER — HYDROCHLOROTHIAZIDE 12.5 MG/1
12.5 CAPSULE ORAL DAILY
Qty: 90 CAPSULE | Refills: 3 | Status: CANCELLED | OUTPATIENT
Start: 2022-03-22 | End: 2023-03-22

## 2022-03-22 NOTE — TELEPHONE ENCOUNTER
----- Message from Christiana Humphrey sent at 3/22/2022  3:42 PM CDT -----  Type:  RX Refill Request    Who Called:  Pt  Refill or New Rx:  refill  RX Name and Strength:  atenoloL (TENORMIN) 25 MG tablet  olmesartan (BENICAR) 20 MG tablet  hydroCHLOROthiazide (MICROZIDE) 12.5 mg capsule  How is the patient currently taking it? (ex. 1XDay):  As directed  Is this a 30 day or 90 day RX:  90  Preferred Pharmacy with phone number:    [facility] - [place], [place] - [address]  [address] [facility] [facility] [address]  Phone: [phone] Fax: [phone]  Local or Mail Order:  mail Order  Ordering Provider:  Teresita Engle Call Back Number:  [phone]   Additional Information: Pt [facility] has not received the rx sent on 03/18-- Please advise--Thank you

## 2022-03-22 NOTE — TELEPHONE ENCOUNTER
Patient is requesting his presciptions of Atenolol 25 mg , Olmessartan 20 mg , and Hctz 12.5 mg . Please resend to [facility] . Patient says they didn't receive them . Thanks

## 2022-04-04 RX ORDER — OLMESARTAN MEDOXOMIL 20 MG/1
20 TABLET ORAL DAILY
Qty: 30 TABLET | Refills: 1 | Status: SHIPPED | OUTPATIENT
Start: 2022-04-04 | End: 2022-04-04

## 2022-04-04 NOTE — TELEPHONE ENCOUNTER
----- Message from Puneet Rogers sent at 4/4/2022  8:58 AM CDT -----  Type:  RX Refill Request    Who Called:  Cynthia morelos/ [facility]   Refill or New Rx:  Refill  RX Name and Strength:  olmesartan (BENICAR) 20 MG tablet  How is the patient currently taking it? (ex. 1XDay):  as directed   Is this a 30 day or 90 day RX:  7 day supply  Preferred Pharmacy with phone number:      [id number] - [place] - [address] [facility] [address]  [address] [address]  Phone: [phone] Fax: [phone]    Local or Mail Order:  Local  Ordering Provider:  Teresita Engle Call Back Number:  [phone]    Additional Information:  needs just a 7 day supply so he doesn't run out, [facility] will be sending the 90.  Please advise == Thank you

## 2022-04-04 NOTE — TELEPHONE ENCOUNTER
RX Name and Strength:  olmesartan (BENICAR) 20 MG tablet   How is the patient currently taking it? (ex. 1XDay):  as directed   Is this a 30 day or 90 day RX:  7 day supply   Preferred Pharmacy with phone number:       [id number] - [place] - [address]   [address] [address]   Phone: [phone] Fax: [phone]     Local or Mail Order:  Local   Ordering Provider:  Teresita Engle Call Back Number:  [phone]     Additional Information:  needs just a 7 day supply so he doesn't run out, [employer] will be sending the 90.  Please advise ==

## 2022-04-20 ENCOUNTER — PES CALL (OUTPATIENT)
Dept: ADMINISTRATIVE | Facility: CLINIC | Age: 72
End: 2022-04-20
Payer: MEDICARE

## 2022-05-03 DIAGNOSIS — I10 ESSENTIAL HYPERTENSION, BENIGN: Primary | ICD-10-CM

## 2022-05-03 RX ORDER — OLMESARTAN MEDOXOMIL 20 MG/1
20 TABLET ORAL DAILY
Qty: 90 TABLET | Refills: 3 | Status: CANCELLED | OUTPATIENT
Start: 2022-05-03 | End: 2023-05-03

## 2022-05-03 NOTE — TELEPHONE ENCOUNTER
[facility] and a 30 day supply to local pharmacy (willl send this request separately).

## 2022-05-03 NOTE — TELEPHONE ENCOUNTER
----- Message from Sondra Montague sent at 5/3/2022 11:06 AM CDT -----  Regarding: refill  Contact: melquiades  Type:  RX Refill Request    Who Called:  melquiades  Refill or New Rx:  refill  RX Name and Strength:  olmesartan (BENICAR) 20 MG tablet   How is the patient currently taking it? (ex. 1XDay):  n/a  Is this a 30 day or 90 day RX:  90    Preferred Pharmacy with phone number:    [facility] - [facility] [address] [facility] [address]  [address] [facility] [address]  Phone: [phone] Fax: [phone]    Local or Mail Order:  mail  Ordering Provider:  jererll Engle Call Back Number:  [phone]    Additional Information:  asking for a short supply to be sent to:  [id number] - [place] - [address]  [address] [address]  Phone: [phone] Fax: [phone]    But pt needs his 90 day supply sent to [facility]. Please all to advise

## 2022-05-04 RX ORDER — OLMESARTAN MEDOXOMIL 20 MG/1
20 TABLET ORAL DAILY
Qty: 30 TABLET | Refills: 0 | Status: SHIPPED | OUTPATIENT
Start: 2022-05-04 | End: 2022-05-04

## 2022-05-05 ENCOUNTER — LAB VISIT (OUTPATIENT)
Dept: LAB | Facility: HOSPITAL | Age: 72
End: 2022-05-05
Attending: INTERNAL MEDICINE
Payer: MEDICARE

## 2022-05-05 DIAGNOSIS — I10 ESSENTIAL HYPERTENSION, BENIGN: ICD-10-CM

## 2022-05-05 LAB
ANION GAP SERPL CALC-SCNC: 14 MMOL/L (ref 8–16)
BUN SERPL-MCNC: 8 MG/DL (ref 8–23)
CALCIUM SERPL-MCNC: 9.1 MG/DL (ref 8.7–10.5)
CHLORIDE SERPL-SCNC: 86 MMOL/L (ref 95–110)
CO2 SERPL-SCNC: 28 MMOL/L (ref 23–29)
CREAT SERPL-MCNC: 0.8 MG/DL (ref 0.5–1.4)
EST. GFR  (AFRICAN AMERICAN): >60 ML/MIN/1.73 M^2
EST. GFR  (NON AFRICAN AMERICAN): >60 ML/MIN/1.73 M^2
GLUCOSE SERPL-MCNC: 115 MG/DL (ref 70–110)
POTASSIUM SERPL-SCNC: 4.3 MMOL/L (ref 3.5–5.1)
SODIUM SERPL-SCNC: 128 MMOL/L (ref 136–145)

## 2022-05-05 PROCEDURE — 80048 BASIC METABOLIC PNL TOTAL CA: CPT | Performed by: INTERNAL MEDICINE

## 2022-05-05 PROCEDURE — 36415 COLL VENOUS BLD VENIPUNCTURE: CPT | Performed by: INTERNAL MEDICINE

## 2022-05-13 ENCOUNTER — PATIENT MESSAGE (OUTPATIENT)
Dept: CARDIOLOGY | Facility: CLINIC | Age: 72
End: 2022-05-13
Payer: MEDICARE

## 2022-05-13 DIAGNOSIS — E87.1 HYPONATREMIA: Primary | ICD-10-CM

## 2022-05-16 NOTE — TELEPHONE ENCOUNTER
Please send in patient's prescription of Olmesartan 20 mg, QD   to [employer] mail order . Thank you.

## 2022-05-17 RX ORDER — OLMESARTAN MEDOXOMIL 20 MG/1
20 TABLET ORAL DAILY
Qty: 90 TABLET | Refills: 3 | Status: SHIPPED | OUTPATIENT
Start: 2022-05-17 | End: 2023-03-09

## 2022-06-22 ENCOUNTER — PATIENT OUTREACH (OUTPATIENT)
Dept: ADMINISTRATIVE | Facility: HOSPITAL | Age: 72
End: 2022-06-22
Payer: MEDICARE

## 2022-08-11 ENCOUNTER — PATIENT OUTREACH (OUTPATIENT)
Dept: ADMINISTRATIVE | Facility: HOSPITAL | Age: 72
End: 2022-08-11
Payer: MEDICARE

## 2022-08-19 ENCOUNTER — TELEPHONE (OUTPATIENT)
Dept: CARDIOLOGY | Facility: CLINIC | Age: 72
End: 2022-08-19
Payer: MEDICARE

## 2022-08-19 NOTE — TELEPHONE ENCOUNTER
----- Message from Puneet Rogers sent at 8/19/2022  8:41 AM CDT -----  Type:  Sooner Appointment Request    Caller is requesting a sooner appointment.  Caller declined first available appointment listed below.  Caller will not accept being placed on the waitlist and is requesting a message be sent to doctor.    Name of Caller:  Pt  When is the first available appointment?     Symptoms:  pacemaker check  Best Call Back Number:  [phone]     Additional Information:  Missed appt needs to reschedule.  Please advise -- Thank you

## 2022-09-20 ENCOUNTER — TELEPHONE (OUTPATIENT)
Dept: CARDIOLOGY | Facility: CLINIC | Age: 72
End: 2022-09-20
Payer: MEDICARE

## 2022-09-20 DIAGNOSIS — I49.5 SICK SINUS SYNDROME: Primary | ICD-10-CM

## 2023-02-07 DIAGNOSIS — Z00.00 ENCOUNTER FOR MEDICARE ANNUAL WELLNESS EXAM: ICD-10-CM

## 2023-02-09 DIAGNOSIS — Z00.00 ENCOUNTER FOR MEDICARE ANNUAL WELLNESS EXAM: ICD-10-CM

## 2023-03-09 ENCOUNTER — PES CALL (OUTPATIENT)
Dept: ADMINISTRATIVE | Facility: CLINIC | Age: 73
End: 2023-03-09
Payer: MEDICARE

## 2023-03-23 ENCOUNTER — PATIENT OUTREACH (OUTPATIENT)
Dept: ADMINISTRATIVE | Facility: HOSPITAL | Age: 73
End: 2023-03-23
Payer: MEDICARE

## 2023-03-23 NOTE — LETTER
AUTHORIZATION FOR RELEASE OF   CONFIDENTIAL INFORMATION    Dear Nicholas Solares MD,    We are seeing Jarrett Blanco, date of birth 1950, in the clinic at [facility]. Luis Alfredo Sands Jr, MD is the patient's PCP. Jarrett Blanco has an outstanding lab/procedure at the time we reviewed his chart. In order to help keep his health information updated, he has authorized us to request the following medical record(s):        (  )  MAMMOGRAM                                      (X)  COLONOSCOPY/PATHOLOGY      (  )  PAP SMEAR                                          (  )  OUTSIDE LAB RESULTS     (  )  DEXA SCAN                                          (  )  EYE EXAM            (  )  FOOT EXAM                                          (  )  ENTIRE RECORD     (  )  OUTSIDE IMMUNIZATIONS                 (  )  _______________         Please fax records to [facility], James H Newcomb Jr, MD, [phone]    If you have any questions, please contact Mohinder Adams LPN Care Coordinator  at [phone].            Patient Name: Jarrett Blanco  : 1950  Patient Phone #: [phone]

## 2023-03-23 NOTE — PROGRESS NOTES
[employer] non-compliant report chart audits for COLON CANCER SCREENING.     Care Everywhere and media, updates requested and reviewed.      Outreach to patient in reference to colon cancer screening.    RE:  Patient needs colon cancer screening    Requested colon from Dr Solares    Outreach:  Colon cancer screening

## 2023-06-22 ENCOUNTER — PES CALL (OUTPATIENT)
Dept: ADMINISTRATIVE | Facility: CLINIC | Age: 73
End: 2023-06-22
Payer: MEDICARE

## 2023-09-13 ENCOUNTER — PATIENT OUTREACH (OUTPATIENT)
Dept: ADMINISTRATIVE | Facility: HOSPITAL | Age: 73
End: 2023-09-13
Payer: MEDICARE

## 2023-09-13 ENCOUNTER — PATIENT MESSAGE (OUTPATIENT)
Dept: ADMINISTRATIVE | Facility: HOSPITAL | Age: 73
End: 2023-09-13
Payer: MEDICARE

## 2023-09-13 NOTE — PROGRESS NOTES

## 2023-09-21 ENCOUNTER — TELEPHONE (OUTPATIENT)
Dept: CARDIOLOGY | Facility: CLINIC | Age: 73
End: 2023-09-21
Payer: MEDICARE

## 2023-09-21 NOTE — TELEPHONE ENCOUNTER
----- Message from Margy Wilcox sent at 9/21/2023 12:07 PM CDT -----  Type: Need Medical Advice   Who Called:  Patient   Best callback number: [phone]  Additional Information: Patient will not be able to make today's appointment he would like to reschedule for the next time   Please call to further assist, Thanks.

## 2023-10-18 DIAGNOSIS — Z95.0 CARDIAC PACEMAKER IN SITU: Primary | ICD-10-CM

## 2023-11-17 NOTE — SUBJECTIVE & OBJECTIVE
Interval History: Pt reports LLE pain is improved since yesterday. He denies weakness, paresthesias, or back pain. Tolerating diet and voiding.     Medications:  Continuous Infusions:  Scheduled Meds:   aspirin  81 mg Oral Daily    atenolol  25 mg Oral BID    bacitracin   Topical (Top) TID    bisacodyl  10 mg Rectal Daily    heparin (porcine)  5,000 Units Subcutaneous Q8H    hydroCHLOROthiazide  12.5 mg Oral Daily    methocarbamol  750 mg Oral QID    mupirocin  1 g Nasal BID    olmesartan  20 mg Oral Daily    rosuvastatin  20 mg Oral QHS    senna-docusate 8.6-50 mg  2 tablet Oral BID    sodium chloride  2 g Oral BID     PRN Meds:acetaminophen, aluminum-magnesium hydroxide-simethicone, diazePAM, morphine, ondansetron, oxyCODONE, promethazine (PHENERGAN) IVPB     Review of Systems  Objective:     Weight: (!) 148.2 kg (326 lb 11.6 oz)  Body mass index is 43.11 kg/m².  Vital Signs (Most Recent):  Temp: 98.1 °F (36.7 °C) (09/13/18 1143)  Pulse: 72 (09/13/18 1143)  Resp: 18 (09/13/18 1143)  BP: (!) 112/59 (09/13/18 1143)  SpO2: 95 % (09/13/18 1143) Vital Signs (24h Range):  Temp:  [98.1 °F (36.7 °C)-98.8 °F (37.1 °C)] 98.1 °F (36.7 °C)  Pulse:  [69-75] 72  Resp:  [16-18] 18  SpO2:  [95 %-97 %] 95 %  BP: (104-123)/(55-67) 112/59                           Neurosurgery Physical Exam  General: well developed, well nourished, no distress.   Head: normocephalic, atraumatic  Neurologic: Alert and oriented. Thought content appropriate.  GCS: Motor: 6/Verbal: 5/Eyes: 4 GCS Total: 15  Mental Status: Awake, Alert, Oriented x 4  Language: No aphasia  Speech: No dysarthria  Cranial nerves: face symmetric, tongue midline, CN II-XII grossly intact.   Eyes: pupils equal, round, reactive to light with accomodation, EOMI.   Pulmonary: normal respirations, no signs of respiratory distress  Abdomen: soft, non-distended, not tender to palpation  Sensory: intact to light touch throughout    Motor Strength:Moves all extremities  spontaneously with good tone.  Full strength upper and lower extremities. No abnormal movements seen.     Strength  Deltoids Triceps Biceps Wrist Extension Wrist Flexion Hand    Upper: R 5/5 5/5 5/5 5/5 5/5 5/5    L 5/5 5/5 5/5 5/5 5/5 5/5     Iliopsoas Quadriceps Knee  Flexion Tibialis  anterior Gastro- cnemius EHL   Lower: R 5/5 5/5 5/5 5/5 5/5 5/5    L 5/5 5/5 5/5 5/5 5/5 5/5     DTR's - 2 + and symmetric in UE and LE  Ram: absent  Clonus: absent  Pulses: 2+ and symmetric radial and dorsalis pedis.  Skin: Skin is warm, dry and intact.  Incisions c/d/i with no surrounding erythema, edema, or drainage. Ecchymosis present.     Significant Labs:  Recent Labs   Lab  09/11/18   1759  09/12/18   0352  09/13/18   0401   GLU  107  94  95   NA  128*  128*  128*   K  4.6  4.4  3.9   CL  94*  94*  94*   CO2  25  24  27   BUN  14  15  15   CREATININE  1.1  0.9  0.8   CALCIUM  9.3  8.8  8.7     Recent Labs   Lab  09/11/18   1800  09/12/18   0352  09/13/18   0401   WBC  13.97*  12.89*  8.75   HGB  12.5*  11.9*  11.0*   HCT  39.0*  37.9*  34.7*   PLT  252  232  226     No results for input(s): LABPT, INR, APTT in the last 48 hours.  Microbiology Results (last 7 days)     ** No results found for the last 168 hours. **        Recent Lab Results       09/13/18  0401      Immature Granulocytes 0.5     Immature Grans (Abs) 0.04  Comment:  Mild elevation in immature granulocytes is non specific and   can be seen in a variety of conditions including stress response,   acute inflammation, trauma and pregnancy. Correlation with other   laboratory and clinical findings is essential.       Anion Gap 7     Baso # 0.03     Basophil% 0.3     BUN, Bld 15     Calcium 8.7     Chloride 94     CO2 27     Creatinine 0.8     Differential Method Automated     eGFR if African American >60.0     eGFR if non  >60.0  Comment:  Calculation used to obtain the estimated glomerular filtration  rate (eGFR) is the CKD-EPI equation.         Eos # 0.1     Eosinophil% 0.6     Glucose 95     Gran # (ANC) 5.8     Gran% 65.7     Hematocrit 34.7     Hemoglobin 11.0     Lymph # 2.1     Lymph% 23.5     MCH 28.2     MCHC 31.7     MCV 89     Mono # 0.8     Mono% 9.4     MPV 8.9     nRBC 0     Platelets 226     Potassium 3.9     RBC 3.90     RDW 13.4     Sodium 128     WBC 8.75         All pertinent labs from the last 24 hours have been reviewed.    Significant Diagnostics:  No new imaging   yes

## 2024-04-03 DIAGNOSIS — I10 ESSENTIAL HYPERTENSION, BENIGN: ICD-10-CM

## 2024-04-04 ENCOUNTER — OFFICE VISIT (OUTPATIENT)
Dept: FAMILY MEDICINE | Facility: CLINIC | Age: 74
End: 2024-04-04
Payer: MEDICARE

## 2024-04-04 VITALS
DIASTOLIC BLOOD PRESSURE: 84 MMHG | BODY MASS INDEX: 41.75 KG/M2 | OXYGEN SATURATION: 96 % | HEIGHT: 73 IN | HEART RATE: 69 BPM | SYSTOLIC BLOOD PRESSURE: 148 MMHG | TEMPERATURE: 98 F | WEIGHT: 315 LBS

## 2024-04-04 DIAGNOSIS — R60.1 GENERALIZED EDEMA: ICD-10-CM

## 2024-04-04 DIAGNOSIS — I25.10 CORONARY ARTERY DISEASE INVOLVING NATIVE CORONARY ARTERY OF NATIVE HEART WITHOUT ANGINA PECTORIS: ICD-10-CM

## 2024-04-04 DIAGNOSIS — E78.2 MIXED HYPERLIPIDEMIA: ICD-10-CM

## 2024-04-04 DIAGNOSIS — I49.5 SICK SINUS SYNDROME: ICD-10-CM

## 2024-04-04 DIAGNOSIS — I10 ESSENTIAL HYPERTENSION, BENIGN: Primary | ICD-10-CM

## 2024-04-04 DIAGNOSIS — I70.0 AORTIC ATHEROSCLEROSIS: ICD-10-CM

## 2024-04-04 DIAGNOSIS — M48.062 LUMBAR STENOSIS WITH NEUROGENIC CLAUDICATION: ICD-10-CM

## 2024-04-04 DIAGNOSIS — Z95.0 CARDIAC PACEMAKER IN SITU: ICD-10-CM

## 2024-04-04 DIAGNOSIS — E66.01 CLASS 3 OBESITY: ICD-10-CM

## 2024-04-04 DIAGNOSIS — Z12.5 SPECIAL SCREENING FOR MALIGNANT NEOPLASM OF PROSTATE: ICD-10-CM

## 2024-04-04 PROCEDURE — 3288F FALL RISK ASSESSMENT DOCD: CPT | Mod: HCNC,CPTII,S$GLB, | Performed by: FAMILY MEDICINE

## 2024-04-04 PROCEDURE — 1159F MED LIST DOCD IN RCRD: CPT | Mod: HCNC,CPTII,S$GLB, | Performed by: FAMILY MEDICINE

## 2024-04-04 PROCEDURE — 1125F AMNT PAIN NOTED PAIN PRSNT: CPT | Mod: HCNC,CPTII,S$GLB, | Performed by: FAMILY MEDICINE

## 2024-04-04 PROCEDURE — 3079F DIAST BP 80-89 MM HG: CPT | Mod: HCNC,CPTII,S$GLB, | Performed by: FAMILY MEDICINE

## 2024-04-04 PROCEDURE — 1101F PT FALLS ASSESS-DOCD LE1/YR: CPT | Mod: HCNC,CPTII,S$GLB, | Performed by: FAMILY MEDICINE

## 2024-04-04 PROCEDURE — 3008F BODY MASS INDEX DOCD: CPT | Mod: HCNC,CPTII,S$GLB, | Performed by: FAMILY MEDICINE

## 2024-04-04 PROCEDURE — 4010F ACE/ARB THERAPY RXD/TAKEN: CPT | Mod: HCNC,CPTII,S$GLB, | Performed by: FAMILY MEDICINE

## 2024-04-04 PROCEDURE — 99999 PR PBB SHADOW E&M-EST. PATIENT-LVL III: CPT | Mod: PBBFAC,HCNC,, | Performed by: FAMILY MEDICINE

## 2024-04-04 PROCEDURE — 1160F RVW MEDS BY RX/DR IN RCRD: CPT | Mod: HCNC,CPTII,S$GLB, | Performed by: FAMILY MEDICINE

## 2024-04-04 PROCEDURE — 99204 OFFICE O/P NEW MOD 45 MIN: CPT | Mod: HCNC,S$GLB,, | Performed by: FAMILY MEDICINE

## 2024-04-04 PROCEDURE — 3077F SYST BP >= 140 MM HG: CPT | Mod: HCNC,CPTII,S$GLB, | Performed by: FAMILY MEDICINE

## 2024-04-04 RX ORDER — OLMESARTAN MEDOXOMIL 20 MG/1
20 TABLET ORAL DAILY
Qty: 90 TABLET | Refills: 3 | Status: SHIPPED | OUTPATIENT
Start: 2024-04-04 | End: 2024-04-04 | Stop reason: SDUPTHER

## 2024-04-04 RX ORDER — ATENOLOL 25 MG/1
25 TABLET ORAL 2 TIMES DAILY
Qty: 180 TABLET | Refills: 3 | Status: SHIPPED | OUTPATIENT
Start: 2024-04-04

## 2024-04-04 RX ORDER — OLMESARTAN MEDOXOMIL 20 MG/1
20 TABLET ORAL DAILY
Qty: 10 TABLET | Refills: 0 | Status: SHIPPED | OUTPATIENT
Start: 2024-04-04 | End: 2024-04-16 | Stop reason: SDUPTHER

## 2024-04-04 RX ORDER — BUMETANIDE 1 MG/1
1 TABLET ORAL DAILY
Qty: 30 TABLET | Refills: 11 | Status: SHIPPED | OUTPATIENT
Start: 2024-04-04 | End: 2025-04-04

## 2024-04-04 RX ORDER — ROSUVASTATIN CALCIUM 20 MG/1
20 TABLET, COATED ORAL DAILY
Qty: 90 TABLET | Refills: 3
Start: 2024-04-04 | End: 2025-04-04

## 2024-04-04 RX ORDER — HYDROCHLOROTHIAZIDE 12.5 MG/1
12.5 CAPSULE ORAL DAILY
Qty: 90 CAPSULE | Refills: 3 | Status: SHIPPED | OUTPATIENT
Start: 2024-04-04

## 2024-04-06 PROBLEM — I70.0 AORTIC ATHEROSCLEROSIS: Status: ACTIVE | Noted: 2024-04-06

## 2024-04-06 PROBLEM — E66.813 CLASS 3 OBESITY: Status: ACTIVE | Noted: 2024-04-06

## 2024-04-06 PROBLEM — Z01.818 PRE-OP EXAM: Status: RESOLVED | Noted: 2018-08-29 | Resolved: 2024-04-06

## 2024-04-06 PROBLEM — E66.01 CLASS 3 OBESITY: Status: ACTIVE | Noted: 2024-04-06

## 2024-04-06 NOTE — PROGRESS NOTES
Subjective     Patient ID: Jarrett Blanco is a 73 y.o. male.    Chief Complaint: Hypertension, Hyperlipidemia, and Coronary Artery Disease    Patient presents here with complaint of constant weeping of fluid from his left leg.  He states he has had swelling of both legs and has a small hole in the leg where he scratched and it will not stop leaking.  He does have a history of coronary artery disease but no history of congestive heart failure.  He is very sedentary at home due to back pain from lumbar spinal stenosis.  He has had surgery in the past and really has not sought further help as he feels like there is nothing else that can be done.  He does have a history of hypertension and hyperlipidemia but does not really follow his diet very well.  His BMI today is 44.56.  He denies any orthopnea or PND.  He denies any shortness of breath on exertion but again he does not really do much due to his back pain.  His blood pressure is elevated here today slightly.  He states he is compliant with his blood pressure medications but does not take his antihyperlipidemic medication.  He states the only time he takes the medication is about a week before he has his blood drawn.  I have not seen the patient since September of 2018.  He does have a history of renal cell carcinoma with a previous nephrectomy.  His last follow-up for his renal cell carcinoma was September of 2020 with Oncology as well as a CT scan.  CT did not show any recurrence but it does show that he has abdominal aortic atherosclerosis.  He also has a history of sick sinus syndrome with placement of cardiac pacemaker.  He states he does get his pacemaker checked on a regular basis.  As far as health maintenance, he is up-to-date with all of his recommended screening exams and immunizations with the exception of tetanus vaccine, shingles vaccine, RSV vaccine, flu vaccine, pneumococcal vaccine, COVID booster, colorectal cancer screening, and lipid profile.  He  defers all the above screening at this time except for blood work for his lipid profile.      Review of Systems   Constitutional:  Negative for chills, fatigue, fever and unexpected weight change.   HENT:  Negative for nasal congestion, postnasal drip and sore throat.    Respiratory:  Negative for cough and shortness of breath.    Cardiovascular:  Positive for leg swelling. Negative for chest pain and palpitations.   Gastrointestinal:  Negative for abdominal pain, diarrhea, nausea and vomiting.   Genitourinary:  Negative for difficulty urinating and dysuria.        Nocturia x2 per night   Musculoskeletal:  Positive for back pain. Negative for arthralgias.   Neurological:  Negative for dizziness, light-headedness and headaches.          Objective     Physical Exam  Vitals reviewed.   Constitutional:       General: He is not in acute distress.     Appearance: Normal appearance. He is well-developed. He is obese.   HENT:      Right Ear: Tympanic membrane and external ear normal.      Left Ear: Tympanic membrane and external ear normal.      Mouth/Throat:      Pharynx: Oropharynx is clear. No posterior oropharyngeal erythema.   Neck:      Thyroid: No thyromegaly.      Vascular: No carotid bruit.   Cardiovascular:      Rate and Rhythm: Normal rate and regular rhythm.      Pulses: Normal pulses.      Heart sounds: Normal heart sounds. No murmur heard.  Pulmonary:      Effort: Pulmonary effort is normal.      Breath sounds: Normal breath sounds. No wheezing or rales.   Musculoskeletal:      Cervical back: Normal range of motion and neck supple.      Right lower leg: Edema present.      Left lower leg: Edema present.      Comments: 3+ edema bilaterally up to about mid calf.  There is a small defect in the skin in the lower right leg which is weeping fluid but there is no surrounding cellulitis.   Lymphadenopathy:      Cervical: No cervical adenopathy.   Neurological:      General: No focal deficit present.      Mental  Status: He is alert and oriented to person, place, and time.      Cranial Nerves: No cranial nerve deficit.      Deep Tendon Reflexes: Reflexes are normal and symmetric.            Assessment and Plan     1. Essential hypertension, benign  -     Comprehensive Metabolic Panel; Future; Expected date: 04/04/2024  -     CBC Auto Differential; Future; Expected date: 04/04/2024    2. Mixed hyperlipidemia  -     Lipid Panel; Future; Expected date: 04/04/2024    3. Coronary artery disease involving native coronary artery of native heart without angina pectoris  -     CBC Auto Differential; Future; Expected date: 04/04/2024  -     B-TYPE NATRIURETIC PEPTIDE; Future; Expected date: 04/04/2024    4. Generalized edema  -     TSH; Future; Expected date: 04/04/2024  -     B-TYPE NATRIURETIC PEPTIDE; Future; Expected date: 04/04/2024  -     bumetanide (BUMEX) 1 MG tablet; Take 1 tablet (1 mg total) by mouth once daily.  Dispense: 30 tablet; Refill: 11    5. Lumbar stenosis with neurogenic claudication    6. Sick sinus syndrome    7. Cardiac pacemaker in situ    8. Class 3 obesity    9. Aortic atherosclerosis    10. Special screening for malignant neoplasm of prostate  -     PSA, Screening; Future; Expected date: 04/04/2024    Other orders  -     Discontinue: olmesartan (BENICAR) 20 MG tablet; Take 1 tablet (20 mg total) by mouth once daily.  Dispense: 90 tablet; Refill: 3  -     olmesartan (BENICAR) 20 MG tablet; Take 1 tablet (20 mg total) by mouth once daily.  Dispense: 10 tablet; Refill: 0  -     atenoloL (TENORMIN) 25 MG tablet; Take 1 tablet (25 mg total) by mouth 2 (two) times daily.  Dispense: 180 tablet; Refill: 3  -     hydroCHLOROthiazide (MICROZIDE) 12.5 mg capsule; Take 1 capsule (12.5 mg total) by mouth once daily.  Dispense: 90 capsule; Refill: 3  -     rosuvastatin (CRESTOR) 20 MG tablet; Take 1 tablet (20 mg total) by mouth once daily.  Dispense: 90 tablet; Refill: 3        1. I have strongly encouraged the patient  to take all of his medications as prescribed as he has been noncompliant with some of these especially his hyperlipidemia treatment  2. Bumex 1 mg q.day to help with edema  3.  Hold HCTZ while on Bumex  4. CBC, CMP, lipid profile, TSH, BNP  5. Will schedule follow-up in 6 months but will follow-up earlier as needed depending on his response to treatment and the results of his lab work          Follow up in about 6 months (around 10/4/2024).

## 2024-04-15 ENCOUNTER — PATIENT MESSAGE (OUTPATIENT)
Dept: FAMILY MEDICINE | Facility: CLINIC | Age: 74
End: 2024-04-15
Payer: MEDICARE

## 2024-04-15 RX ORDER — OLMESARTAN MEDOXOMIL 20 MG/1
TABLET ORAL
Qty: 90 TABLET | Refills: 0 | OUTPATIENT
Start: 2024-04-15

## 2024-04-15 RX ORDER — ATENOLOL 25 MG/1
TABLET ORAL
Qty: 180 TABLET | Refills: 0 | OUTPATIENT
Start: 2024-04-15

## 2024-04-15 RX ORDER — HYDROCHLOROTHIAZIDE 12.5 MG/1
CAPSULE ORAL
Qty: 90 CAPSULE | Refills: 0 | OUTPATIENT
Start: 2024-04-15

## 2024-04-15 NOTE — TELEPHONE ENCOUNTER
Care Due:                  Date            Visit Type   Department     Provider  --------------------------------------------------------------------------------                                EP -                              PRIMARY      [facility]  Last Visit: 04-      CARE ([facility])   PRACTICE       Luis Alfredo Sands                              EP -                              PRIMARY      [facility]  Next Visit: 11-      CARE ([facility])   PRACTICE       Luis Alfredo Sands                                                            Last  Test          Frequency    Reason                     Performed    Due Date  --------------------------------------------------------------------------------    CMP.........  12 months..  bumetanide,                Not Found    Overdue                             hydroCHLOROthiazide,                             olmesartan, rosuvastatin.    Lipid Panel.  12 months..  rosuvastatin.............  09- 09-    Health [facility] Embedded Care Due Messages. Reference number: [id number].   4/15/2024 5:52:30 PM CDT

## 2024-04-15 NOTE — TELEPHONE ENCOUNTER
Refill Decision Note   Jarrett Blanco  is requesting a refill authorization.  Brief Assessment and Rationale for Refill:  Quick Discontinue     Medication Therapy Plan: Pharmacy is requesting new scripts for the following medications without required information, (sig/ frequency/qty/etc)     Medication Reconciliation Completed: No   Comments:     No Care Gaps recommended.     Note composed:6:32 PM 04/15/2024

## 2024-04-16 RX ORDER — OLMESARTAN MEDOXOMIL 20 MG/1
20 TABLET ORAL DAILY
Qty: 90 TABLET | Refills: 3 | Status: SHIPPED | OUTPATIENT
Start: 2024-04-16

## 2024-04-16 NOTE — TELEPHONE ENCOUNTER
No care due was identified.  Health [facility] Embedded Care Due Messages. Reference number: [id number].   4/16/2024 8:29:24 AM CDT

## 2024-04-16 NOTE — TELEPHONE ENCOUNTER
Refill Routing Note   Medication(s) are not appropriate for processing by [facility] for the following reason(s):        Required vitals abnormal  Required labs outdated  New or recently adjusted medication    [facility] action(s):  Defer               Appointments  past 12m or future 3m with PCP    Date Provider   Last Visit   4/4/2024 Luis Alfredo Sands Jr., MD   Next Visit   Visit date not found Luis Alfredo Sands Jr., MD   ED visits in past 90 days: 0        Note composed:8:50 AM 04/16/2024

## 2024-04-17 RX ORDER — OLMESARTAN MEDOXOMIL 20 MG/1
20 TABLET ORAL DAILY
Qty: 90 TABLET | Refills: 3 | Status: CANCELLED | OUTPATIENT
Start: 2024-04-17

## 2024-04-17 NOTE — TELEPHONE ENCOUNTER
No care due was identified.  Health [facility] Embedded Care Due Messages. Reference number: [id number].   4/17/2024 12:34:14 PM CDT

## 2024-10-18 ENCOUNTER — CLINICAL SUPPORT (OUTPATIENT)
Dept: CARDIOLOGY | Facility: CLINIC | Age: 74
End: 2024-10-18

## 2024-10-18 ENCOUNTER — HOSPITAL ENCOUNTER (OUTPATIENT)
Dept: CARDIOLOGY | Facility: CLINIC | Age: 74
Discharge: HOME | End: 2024-10-18
Attending: INTERNAL MEDICINE
Payer: MEDICARE

## 2024-10-18 DIAGNOSIS — R00.1 BRADYCARDIA, UNSPECIFIED: ICD-10-CM

## 2024-10-18 DIAGNOSIS — Z95.0 PRESENCE OF CARDIAC PACEMAKER: ICD-10-CM

## 2024-10-18 PROCEDURE — 93296 REM INTERROG EVL PM/IDS: CPT | Mod: PN | Performed by: INTERNAL MEDICINE

## 2024-10-18 PROCEDURE — 93294 REM INTERROG EVL PM/LDLS PM: CPT | Mod: S$GLB,,, | Performed by: INTERNAL MEDICINE

## 2024-10-30 ENCOUNTER — PATIENT OUTREACH (OUTPATIENT)
Dept: ADMINISTRATIVE | Facility: HOSPITAL | Age: 74
End: 2024-10-30
Payer: MEDICARE

## 2024-10-31 ENCOUNTER — LAB VISIT (OUTPATIENT)
Dept: LAB | Facility: HOSPITAL | Age: 74
End: 2024-10-31
Attending: FAMILY MEDICINE
Payer: MEDICARE

## 2024-10-31 DIAGNOSIS — E78.2 MIXED HYPERLIPIDEMIA: ICD-10-CM

## 2024-10-31 DIAGNOSIS — I10 ESSENTIAL HYPERTENSION, BENIGN: ICD-10-CM

## 2024-10-31 DIAGNOSIS — R60.1 GENERALIZED EDEMA: ICD-10-CM

## 2024-10-31 DIAGNOSIS — I25.10 CORONARY ARTERY DISEASE INVOLVING NATIVE CORONARY ARTERY OF NATIVE HEART WITHOUT ANGINA PECTORIS: ICD-10-CM

## 2024-10-31 DIAGNOSIS — Z12.5 SPECIAL SCREENING FOR MALIGNANT NEOPLASM OF PROSTATE: ICD-10-CM

## 2024-10-31 LAB
ALBUMIN SERPL BCP-MCNC: 3.6 G/DL (ref 3.5–5.2)
ALP SERPL-CCNC: 83 U/L (ref 40–150)
ALT SERPL W/O P-5'-P-CCNC: 19 U/L (ref 10–44)
ANION GAP SERPL CALC-SCNC: 9 MMOL/L (ref 8–16)
AST SERPL-CCNC: 24 U/L (ref 10–40)
BASOPHILS # BLD AUTO: 0.06 K/UL (ref 0–0.2)
BASOPHILS NFR BLD: 0.7 % (ref 0–1.9)
BILIRUB SERPL-MCNC: 0.9 MG/DL (ref 0.1–1)
BNP SERPL-MCNC: 47 PG/ML (ref 0–99)
BUN SERPL-MCNC: 7 MG/DL (ref 8–23)
CALCIUM SERPL-MCNC: 9.1 MG/DL (ref 8.7–10.5)
CHLORIDE SERPL-SCNC: 90 MMOL/L (ref 95–110)
CHOLEST SERPL-MCNC: 165 MG/DL (ref 120–199)
CHOLEST/HDLC SERPL: 3.2 {RATIO} (ref 2–5)
CO2 SERPL-SCNC: 29 MMOL/L (ref 23–29)
COMPLEXED PSA SERPL-MCNC: 2.2 NG/ML (ref 0–4)
CREAT SERPL-MCNC: 0.9 MG/DL (ref 0.5–1.4)
DIFFERENTIAL METHOD BLD: ABNORMAL
EOSINOPHIL # BLD AUTO: 0.2 K/UL (ref 0–0.5)
EOSINOPHIL NFR BLD: 2.5 % (ref 0–8)
ERYTHROCYTE [DISTWIDTH] IN BLOOD BY AUTOMATED COUNT: 13.2 % (ref 11.5–14.5)
EST. GFR  (NO RACE VARIABLE): >60 ML/MIN/1.73 M^2
GLUCOSE SERPL-MCNC: 105 MG/DL (ref 70–110)
HCT VFR BLD AUTO: 49.3 % (ref 40–54)
HDLC SERPL-MCNC: 51 MG/DL (ref 40–75)
HDLC SERPL: 30.9 % (ref 20–50)
HGB BLD-MCNC: 16.2 G/DL (ref 14–18)
IMM GRANULOCYTES # BLD AUTO: 0.03 K/UL (ref 0–0.04)
IMM GRANULOCYTES NFR BLD AUTO: 0.3 % (ref 0–0.5)
LDLC SERPL CALC-MCNC: 95.4 MG/DL (ref 63–159)
LYMPHOCYTES # BLD AUTO: 2.5 K/UL (ref 1–4.8)
LYMPHOCYTES NFR BLD: 28.3 % (ref 18–48)
MCH RBC QN AUTO: 29.5 PG (ref 27–31)
MCHC RBC AUTO-ENTMCNC: 32.9 G/DL (ref 32–36)
MCV RBC AUTO: 90 FL (ref 82–98)
MONOCYTES # BLD AUTO: 0.7 K/UL (ref 0.3–1)
MONOCYTES NFR BLD: 8.1 % (ref 4–15)
NEUTROPHILS # BLD AUTO: 5.2 K/UL (ref 1.8–7.7)
NEUTROPHILS NFR BLD: 60.1 % (ref 38–73)
NONHDLC SERPL-MCNC: 114 MG/DL
NRBC BLD-RTO: 0 /100 WBC
PLATELET # BLD AUTO: 274 K/UL (ref 150–450)
PMV BLD AUTO: 8.7 FL (ref 9.2–12.9)
POTASSIUM SERPL-SCNC: 4.6 MMOL/L (ref 3.5–5.1)
PROT SERPL-MCNC: 7.2 G/DL (ref 6–8.4)
RBC # BLD AUTO: 5.49 M/UL (ref 4.6–6.2)
SODIUM SERPL-SCNC: 128 MMOL/L (ref 136–145)
TRIGL SERPL-MCNC: 93 MG/DL (ref 30–150)
TSH SERPL DL<=0.005 MIU/L-ACNC: 2.88 UIU/ML (ref 0.4–4)
WBC # BLD AUTO: 8.68 K/UL (ref 3.9–12.7)

## 2024-10-31 PROCEDURE — 83880 ASSAY OF NATRIURETIC PEPTIDE: CPT | Mod: HCNC | Performed by: FAMILY MEDICINE

## 2024-10-31 PROCEDURE — 80053 COMPREHEN METABOLIC PANEL: CPT | Mod: HCNC | Performed by: FAMILY MEDICINE

## 2024-10-31 PROCEDURE — 85025 COMPLETE CBC W/AUTO DIFF WBC: CPT | Mod: HCNC | Performed by: FAMILY MEDICINE

## 2024-10-31 PROCEDURE — 84153 ASSAY OF PSA TOTAL: CPT | Mod: HCNC | Performed by: FAMILY MEDICINE

## 2024-10-31 PROCEDURE — 84443 ASSAY THYROID STIM HORMONE: CPT | Mod: HCNC | Performed by: FAMILY MEDICINE

## 2024-10-31 PROCEDURE — 36415 COLL VENOUS BLD VENIPUNCTURE: CPT | Mod: HCNC,PO | Performed by: FAMILY MEDICINE

## 2024-10-31 PROCEDURE — 80061 LIPID PANEL: CPT | Mod: HCNC | Performed by: FAMILY MEDICINE

## 2024-11-04 ENCOUNTER — OFFICE VISIT (OUTPATIENT)
Dept: FAMILY MEDICINE | Facility: CLINIC | Age: 74
End: 2024-11-04
Payer: MEDICARE

## 2024-11-04 VITALS
SYSTOLIC BLOOD PRESSURE: 130 MMHG | HEIGHT: 73 IN | BODY MASS INDEX: 41.75 KG/M2 | WEIGHT: 315 LBS | HEART RATE: 73 BPM | DIASTOLIC BLOOD PRESSURE: 84 MMHG | TEMPERATURE: 99 F | OXYGEN SATURATION: 97 %

## 2024-11-04 DIAGNOSIS — I25.10 CORONARY ARTERY DISEASE INVOLVING NATIVE CORONARY ARTERY OF NATIVE HEART WITHOUT ANGINA PECTORIS: ICD-10-CM

## 2024-11-04 DIAGNOSIS — Z95.0 CARDIAC PACEMAKER IN SITU: ICD-10-CM

## 2024-11-04 DIAGNOSIS — E87.1 HYPONATREMIA: ICD-10-CM

## 2024-11-04 DIAGNOSIS — Z90.5 HISTORY OF NEPHRECTOMY: ICD-10-CM

## 2024-11-04 DIAGNOSIS — E78.2 MIXED HYPERLIPIDEMIA: ICD-10-CM

## 2024-11-04 DIAGNOSIS — I49.5 SICK SINUS SYNDROME: ICD-10-CM

## 2024-11-04 DIAGNOSIS — I10 ESSENTIAL HYPERTENSION, BENIGN: Primary | ICD-10-CM

## 2024-11-04 DIAGNOSIS — Z85.528 HISTORY OF RENAL CELL CARCINOMA: ICD-10-CM

## 2024-11-04 DIAGNOSIS — M48.061 SPINAL STENOSIS, LUMBAR REGION, WITHOUT NEUROGENIC CLAUDICATION: ICD-10-CM

## 2024-11-04 PROCEDURE — 99999 PR PBB SHADOW E&M-EST. PATIENT-LVL III: CPT | Mod: PBBFAC,HCNC,, | Performed by: FAMILY MEDICINE

## 2024-11-04 PROCEDURE — 1101F PT FALLS ASSESS-DOCD LE1/YR: CPT | Mod: HCNC,CPTII,S$GLB, | Performed by: FAMILY MEDICINE

## 2024-11-04 PROCEDURE — 3288F FALL RISK ASSESSMENT DOCD: CPT | Mod: HCNC,CPTII,S$GLB, | Performed by: FAMILY MEDICINE

## 2024-11-04 PROCEDURE — 4010F ACE/ARB THERAPY RXD/TAKEN: CPT | Mod: HCNC,CPTII,S$GLB, | Performed by: FAMILY MEDICINE

## 2024-11-04 PROCEDURE — 1125F AMNT PAIN NOTED PAIN PRSNT: CPT | Mod: HCNC,CPTII,S$GLB, | Performed by: FAMILY MEDICINE

## 2024-11-04 PROCEDURE — 3008F BODY MASS INDEX DOCD: CPT | Mod: HCNC,CPTII,S$GLB, | Performed by: FAMILY MEDICINE

## 2024-11-04 PROCEDURE — 99214 OFFICE O/P EST MOD 30 MIN: CPT | Mod: HCNC,S$GLB,, | Performed by: FAMILY MEDICINE

## 2024-11-04 PROCEDURE — 1159F MED LIST DOCD IN RCRD: CPT | Mod: HCNC,CPTII,S$GLB, | Performed by: FAMILY MEDICINE

## 2024-11-04 PROCEDURE — 1160F RVW MEDS BY RX/DR IN RCRD: CPT | Mod: HCNC,CPTII,S$GLB, | Performed by: FAMILY MEDICINE

## 2024-11-04 PROCEDURE — 3075F SYST BP GE 130 - 139MM HG: CPT | Mod: HCNC,CPTII,S$GLB, | Performed by: FAMILY MEDICINE

## 2024-11-04 PROCEDURE — 3079F DIAST BP 80-89 MM HG: CPT | Mod: HCNC,CPTII,S$GLB, | Performed by: FAMILY MEDICINE

## 2024-11-04 RX ORDER — PENICILLIN V POTASSIUM 500 MG/1
500 TABLET, FILM COATED ORAL 4 TIMES DAILY
Qty: 28 TABLET | Refills: 0 | Status: SHIPPED | OUTPATIENT
Start: 2024-11-04 | End: 2024-11-11

## 2024-11-06 NOTE — PROGRESS NOTES
Subjective     Patient ID: Jarrett Blanco is a 74 y.o. male.    Chief Complaint: Hypertension, Hyperlipidemia, Coronary Artery Disease, and Lumbar spinal stenosis    Patient presents here for 6 month follow-up of hypertension, hyperlipidemia, CAD, and lumbar spinal stenosis.  His weight has decreased 8 lb over the last 6 months and his BMI is 43.41.  His hypertension is well controlled with his present medication and he is tolerating his medication well.  His blood pressure today is 130/84.  His hyperlipidemia is well controlled but he does not take his Crestor on a regular basis.  In fact, he states that he takes it about a week before he has his blood drawn to improve his blood work.  His recent lipid profile shows a total cholesterol 165, HDL 51, LDL 95, and triglycerides 93.  The rest of his lab work shows a normal CBC, normal TSH at 2.878, normal PSA at 2.2, and normal CMP except for a decreased sodium of 128.  His coronary artery disease is stable as he denies any chest pains or palpitations.  He does have significant problem with his lumbar spinal stenosis and does have a lot of pain.  He does not get out of the house very much in mainly sits in his chair secondary to the pain.  He does have a history of renal cell carcinoma and nephrectomy.  He also does have a history of sick sinus syndrome and has had a pacemaker placed.  As far as his sodium noted above, when I review his labs in the past, he tends to run proximally 128-132 all the time.  However, he is on HCTZ 12.5 mg and I have asked him to hold this to see if this makes a difference in his sodium level.  We will recheck his sodium in 2 weeks and check his blood pressure at same time.  As far as health maintenance, he is up-to-date with all of his recommended screening exams and immunizations with the exception of tetanus vaccine, shingles vaccine, RSV vaccine, colorectal cancer screen, flu vaccine, pneumonia vaccine, and COVID booster.      Review of  Systems   Constitutional:  Negative for chills, fatigue, fever and unexpected weight change.   HENT:  Positive for dental problem. Negative for nasal congestion, postnasal drip and sore throat.    Respiratory:  Negative for cough, shortness of breath and wheezing.    Cardiovascular:  Negative for chest pain and palpitations.   Gastrointestinal:  Negative for abdominal pain, diarrhea, nausea and vomiting.   Musculoskeletal:  Positive for arthralgias and back pain.   Neurological:  Negative for dizziness, light-headedness and headaches.   Hematological:  Negative for adenopathy. Does not bruise/bleed easily.   Psychiatric/Behavioral:  Negative for sleep disturbance. The patient is not nervous/anxious.           Objective     Physical Exam  Vitals reviewed.   Constitutional:       General: He is not in acute distress.     Appearance: Normal appearance. He is well-developed. He is obese.   HENT:      Right Ear: Tympanic membrane and external ear normal.      Left Ear: Tympanic membrane and external ear normal.      Mouth/Throat:      Pharynx: Oropharynx is clear. No posterior oropharyngeal erythema.   Neck:      Thyroid: No thyromegaly.   Cardiovascular:      Rate and Rhythm: Normal rate and regular rhythm.      Pulses: Normal pulses.      Heart sounds: Normal heart sounds. No murmur heard.  Pulmonary:      Effort: Pulmonary effort is normal.      Breath sounds: Normal breath sounds. No wheezing or rales.   Genitourinary:     Penis: Normal.       Prostate: Normal.      Rectum: Normal.   Musculoskeletal:         General: Normal range of motion.      Cervical back: Normal range of motion and neck supple.      Right lower leg: No edema.      Left lower leg: No edema.   Lymphadenopathy:      Cervical: No cervical adenopathy.   Neurological:      General: No focal deficit present.      Mental Status: He is alert and oriented to person, place, and time.      Cranial Nerves: No cranial nerve deficit.      Deep Tendon Reflexes:  Reflexes are normal and symmetric.            Assessment and Plan     1. Essential hypertension, benign    2. Mixed hyperlipidemia    3. Coronary artery disease involving native coronary artery of native heart without angina pectoris    4. Sick sinus syndrome    5. Cardiac pacemaker in situ    6. Spinal stenosis, lumbar region, without neurogenic claudication    7. Hyponatremia  -     Basic Metabolic Panel; Future; Expected date: 11/18/2024    8. History of renal cell carcinoma    9. History of nephrectomy    10. BMI 40.0-44.9, adult    Other orders  -     penicillin v potassium (VEETID) 500 MG tablet; Take 1 tablet (500 mg total) by mouth 4 (four) times daily. for 7 days  Dispense: 28 tablet; Refill: 0        1. Continue present medication as his hypertension, hyperlipidemia, CAD are stable.  2. Lumbar spinal stenosis causes him a lot of pain but he prefers not to seek any treatment as treatment has failed in the past   3. Continue low-sodium, low-fat low-cholesterol diet.  BMI is 43.41  4.  Continue follow-up with Cardiology  5. Hold HCTZ for the next 2 weeks  6. BMP in 2 weeks to recheck sodium level off HCTZ  7.  Follow up with new physician in 6 months           No follow-ups on file.

## 2024-12-29 ENCOUNTER — CLINICAL SUPPORT (OUTPATIENT)
Dept: CARDIOLOGY | Facility: CLINIC | Age: 74
End: 2024-12-29

## 2024-12-29 DIAGNOSIS — R00.1 BRADYCARDIA, UNSPECIFIED: ICD-10-CM

## 2024-12-29 DIAGNOSIS — Z95.0 PRESENCE OF CARDIAC PACEMAKER: ICD-10-CM

## 2025-01-17 ENCOUNTER — HOSPITAL ENCOUNTER (OUTPATIENT)
Dept: CARDIOLOGY | Facility: CLINIC | Age: 75
Discharge: HOME | End: 2025-01-17
Attending: INTERNAL MEDICINE
Payer: MEDICARE

## 2025-01-17 DIAGNOSIS — Z95.0 PRESENCE OF CARDIAC PACEMAKER: ICD-10-CM

## 2025-01-17 DIAGNOSIS — R00.1 BRADYCARDIA, UNSPECIFIED: ICD-10-CM

## 2025-01-17 PROCEDURE — 93296 REM INTERROG EVL PM/IDS: CPT | Mod: PN | Performed by: INTERNAL MEDICINE

## 2025-01-17 PROCEDURE — 93294 REM INTERROG EVL PM/LDLS PM: CPT | Mod: S$GLB,,, | Performed by: INTERNAL MEDICINE

## 2025-02-11 LAB
OHS CV AF BURDEN PERCENT: < 1
OHS CV AF BURDEN PERCENT: < 1
OHS CV DC REMOTE DEVICE TYPE: NORMAL
OHS CV DC REMOTE DEVICE TYPE: NORMAL
OHS CV RV PACING PERCENT: 0 %
OHS CV RV PACING PERCENT: 0 %

## 2025-04-14 RX ORDER — ATENOLOL 25 MG/1
25 TABLET ORAL 2 TIMES DAILY
Qty: 180 TABLET | Refills: 0 | Status: SHIPPED | OUTPATIENT
Start: 2025-04-14

## 2025-04-14 RX ORDER — OLMESARTAN MEDOXOMIL 20 MG/1
20 TABLET ORAL DAILY
Qty: 90 TABLET | Refills: 0 | Status: SHIPPED | OUTPATIENT
Start: 2025-04-14

## 2025-04-14 NOTE — TELEPHONE ENCOUNTER
----- Message from Sushila sent at 4/14/2025 12:21 PM CDT -----  Contact: Patient  Type:  RX Refill RequestWho Called:   PatientRefill or New Rx:  RefillsRX Name and Strength:olmesartan (BENICAR) 20 MG tablet atenoloL (TENORMIN) 25 MG tablet Preferred Pharmacy with phone number:  [facility][id number] - [place] - [address] [facility] [id number] [address]Osaic: [phone] Fax: [phone] or Mail Order:  LocalOrdering Provider:  Dr HarkinsWould the patient rather a call back or a response via [facility]?   Call Romi Call Back Number:  [phone] Information:   States he is running out of his medications and needs refills prior to his 6/3 appointment - please call - thank you

## 2025-04-20 ENCOUNTER — HOSPITAL ENCOUNTER (OUTPATIENT)
Dept: CARDIOLOGY | Facility: CLINIC | Age: 75
Discharge: HOME | End: 2025-04-20
Attending: INTERNAL MEDICINE
Payer: MEDICARE

## 2025-04-20 ENCOUNTER — CLINICAL SUPPORT (OUTPATIENT)
Dept: CARDIOLOGY | Facility: CLINIC | Age: 75
End: 2025-04-20

## 2025-04-20 DIAGNOSIS — Z95.0 PRESENCE OF CARDIAC PACEMAKER: ICD-10-CM

## 2025-04-20 DIAGNOSIS — R00.1 BRADYCARDIA, UNSPECIFIED: ICD-10-CM

## 2025-04-20 PROCEDURE — 93294 REM INTERROG EVL PM/LDLS PM: CPT | Mod: S$GLB,,, | Performed by: INTERNAL MEDICINE

## 2025-04-20 PROCEDURE — 93296 REM INTERROG EVL PM/IDS: CPT | Mod: PN | Performed by: INTERNAL MEDICINE

## 2025-04-25 ENCOUNTER — PATIENT OUTREACH (OUTPATIENT)
Dept: ADMINISTRATIVE | Facility: HOSPITAL | Age: 75
End: 2025-04-25
Payer: MEDICARE

## 2025-04-25 RX ORDER — ROSUVASTATIN CALCIUM 20 MG/1
20 TABLET, COATED ORAL DAILY
Qty: 90 TABLET | Refills: 0 | Status: SHIPPED | OUTPATIENT
Start: 2025-04-25 | End: 2026-04-25

## 2025-06-03 ENCOUNTER — OFFICE VISIT (OUTPATIENT)
Dept: FAMILY MEDICINE | Facility: CLINIC | Age: 75
End: 2025-06-03
Payer: MEDICARE

## 2025-06-03 VITALS
OXYGEN SATURATION: 96 % | RESPIRATION RATE: 17 BRPM | SYSTOLIC BLOOD PRESSURE: 136 MMHG | DIASTOLIC BLOOD PRESSURE: 82 MMHG | HEIGHT: 73 IN | TEMPERATURE: 98 F | HEART RATE: 82 BPM | WEIGHT: 315 LBS | BODY MASS INDEX: 41.75 KG/M2

## 2025-06-03 DIAGNOSIS — R79.9 ABNORMAL BLOOD FINDING: ICD-10-CM

## 2025-06-03 DIAGNOSIS — I10 ESSENTIAL HYPERTENSION, BENIGN: Primary | ICD-10-CM

## 2025-06-03 DIAGNOSIS — E78.2 MIXED HYPERLIPIDEMIA: ICD-10-CM

## 2025-06-03 DIAGNOSIS — E66.01 CLASS 3 SEVERE OBESITY WITHOUT SERIOUS COMORBIDITY WITH BODY MASS INDEX (BMI) OF 40.0 TO 44.9 IN ADULT, UNSPECIFIED OBESITY TYPE: ICD-10-CM

## 2025-06-03 DIAGNOSIS — Z12.11 SCREENING FOR COLON CANCER: ICD-10-CM

## 2025-06-03 DIAGNOSIS — I49.5 SICK SINUS SYNDROME: ICD-10-CM

## 2025-06-03 DIAGNOSIS — E66.813 CLASS 3 SEVERE OBESITY WITHOUT SERIOUS COMORBIDITY WITH BODY MASS INDEX (BMI) OF 40.0 TO 44.9 IN ADULT, UNSPECIFIED OBESITY TYPE: ICD-10-CM

## 2025-06-03 PROCEDURE — 99214 OFFICE O/P EST MOD 30 MIN: CPT | Mod: S$GLB,,, | Performed by: FAMILY MEDICINE

## 2025-06-03 PROCEDURE — 3075F SYST BP GE 130 - 139MM HG: CPT | Mod: CPTII,S$GLB,, | Performed by: FAMILY MEDICINE

## 2025-06-03 PROCEDURE — 4010F ACE/ARB THERAPY RXD/TAKEN: CPT | Mod: CPTII,S$GLB,, | Performed by: FAMILY MEDICINE

## 2025-06-03 PROCEDURE — 3288F FALL RISK ASSESSMENT DOCD: CPT | Mod: CPTII,S$GLB,, | Performed by: FAMILY MEDICINE

## 2025-06-03 PROCEDURE — 3079F DIAST BP 80-89 MM HG: CPT | Mod: CPTII,S$GLB,, | Performed by: FAMILY MEDICINE

## 2025-06-03 PROCEDURE — 1101F PT FALLS ASSESS-DOCD LE1/YR: CPT | Mod: CPTII,S$GLB,, | Performed by: FAMILY MEDICINE

## 2025-06-03 PROCEDURE — 1125F AMNT PAIN NOTED PAIN PRSNT: CPT | Mod: CPTII,S$GLB,, | Performed by: FAMILY MEDICINE

## 2025-06-03 PROCEDURE — 99999 PR PBB SHADOW E&M-EST. PATIENT-LVL III: CPT | Mod: PBBFAC,,, | Performed by: FAMILY MEDICINE

## 2025-06-03 PROCEDURE — 3008F BODY MASS INDEX DOCD: CPT | Mod: CPTII,S$GLB,, | Performed by: FAMILY MEDICINE

## 2025-06-03 PROCEDURE — 1159F MED LIST DOCD IN RCRD: CPT | Mod: CPTII,S$GLB,, | Performed by: FAMILY MEDICINE

## 2025-06-03 PROCEDURE — G2211 COMPLEX E/M VISIT ADD ON: HCPCS | Mod: S$GLB,,, | Performed by: FAMILY MEDICINE

## 2025-06-03 RX ORDER — ATENOLOL 25 MG/1
25 TABLET ORAL 2 TIMES DAILY
Qty: 180 TABLET | Refills: 3 | Status: SHIPPED | OUTPATIENT
Start: 2025-06-03

## 2025-06-03 RX ORDER — ROSUVASTATIN CALCIUM 20 MG/1
20 TABLET, COATED ORAL DAILY
Qty: 90 TABLET | Refills: 3 | Status: SHIPPED | OUTPATIENT
Start: 2025-06-03 | End: 2026-06-03

## 2025-06-03 RX ORDER — OLMESARTAN MEDOXOMIL 20 MG/1
20 TABLET ORAL DAILY
Qty: 90 TABLET | Refills: 3 | Status: SHIPPED | OUTPATIENT
Start: 2025-06-03 | End: 2026-06-03

## 2025-06-18 ENCOUNTER — LAB VISIT (OUTPATIENT)
Dept: LAB | Facility: HOSPITAL | Age: 75
End: 2025-06-18
Attending: FAMILY MEDICINE
Payer: MEDICARE

## 2025-06-18 DIAGNOSIS — I10 ESSENTIAL HYPERTENSION, BENIGN: ICD-10-CM

## 2025-06-18 DIAGNOSIS — E78.2 MIXED HYPERLIPIDEMIA: ICD-10-CM

## 2025-06-18 DIAGNOSIS — R79.9 ABNORMAL BLOOD FINDING: ICD-10-CM

## 2025-06-18 LAB
ABSOLUTE EOSINOPHIL (OHS): 0.34 K/UL
ABSOLUTE MONOCYTE (OHS): 0.79 K/UL (ref 0.3–1)
ABSOLUTE NEUTROPHIL COUNT (OHS): 5.45 K/UL (ref 1.8–7.7)
ALBUMIN SERPL BCP-MCNC: 3.7 G/DL (ref 3.5–5.2)
ALP SERPL-CCNC: 75 UNIT/L (ref 40–150)
ALT SERPL W/O P-5'-P-CCNC: 16 UNIT/L (ref 10–44)
ANION GAP (OHS): 9 MMOL/L (ref 8–16)
AST SERPL-CCNC: 20 UNIT/L (ref 11–45)
BASOPHILS # BLD AUTO: 0.08 K/UL
BASOPHILS NFR BLD AUTO: 0.9 %
BILIRUB SERPL-MCNC: 1 MG/DL (ref 0.1–1)
BUN SERPL-MCNC: 7 MG/DL (ref 8–23)
CALCIUM SERPL-MCNC: 9.1 MG/DL (ref 8.7–10.5)
CHLORIDE SERPL-SCNC: 93 MMOL/L (ref 95–110)
CHOLEST SERPL-MCNC: 214 MG/DL (ref 120–199)
CHOLEST/HDLC SERPL: 3.9 {RATIO} (ref 2–5)
CO2 SERPL-SCNC: 29 MMOL/L (ref 23–29)
CREAT SERPL-MCNC: 0.9 MG/DL (ref 0.5–1.4)
EAG (OHS): 108 MG/DL (ref 68–131)
ERYTHROCYTE [DISTWIDTH] IN BLOOD BY AUTOMATED COUNT: 13.4 % (ref 11.5–14.5)
GFR SERPLBLD CREATININE-BSD FMLA CKD-EPI: >60 ML/MIN/1.73/M2
GLUCOSE SERPL-MCNC: 100 MG/DL (ref 70–110)
HBA1C MFR BLD: 5.4 % (ref 4–5.6)
HCT VFR BLD AUTO: 52.5 % (ref 40–54)
HDLC SERPL-MCNC: 55 MG/DL (ref 40–75)
HDLC SERPL: 25.7 % (ref 20–50)
HGB BLD-MCNC: 16.7 GM/DL (ref 14–18)
IMM GRANULOCYTES # BLD AUTO: 0.03 K/UL (ref 0–0.04)
IMM GRANULOCYTES NFR BLD AUTO: 0.3 % (ref 0–0.5)
LDLC SERPL CALC-MCNC: 133.2 MG/DL (ref 63–159)
LYMPHOCYTES # BLD AUTO: 2.26 K/UL (ref 1–4.8)
MCH RBC QN AUTO: 28.3 PG (ref 27–31)
MCHC RBC AUTO-ENTMCNC: 31.8 G/DL (ref 32–36)
MCV RBC AUTO: 89 FL (ref 82–98)
NONHDLC SERPL-MCNC: 159 MG/DL
NUCLEATED RBC (/100WBC) (OHS): 0 /100 WBC
PLATELET # BLD AUTO: 251 K/UL (ref 150–450)
PMV BLD AUTO: 8.9 FL (ref 9.2–12.9)
POTASSIUM SERPL-SCNC: 4.6 MMOL/L (ref 3.5–5.1)
PROT SERPL-MCNC: 7.1 GM/DL (ref 6–8.4)
RBC # BLD AUTO: 5.9 M/UL (ref 4.6–6.2)
RELATIVE EOSINOPHIL (OHS): 3.8 %
RELATIVE LYMPHOCYTE (OHS): 25.3 % (ref 18–48)
RELATIVE MONOCYTE (OHS): 8.8 % (ref 4–15)
RELATIVE NEUTROPHIL (OHS): 60.9 % (ref 38–73)
SODIUM SERPL-SCNC: 131 MMOL/L (ref 136–145)
TRIGL SERPL-MCNC: 129 MG/DL (ref 30–150)
TSH SERPL-ACNC: 3.09 UIU/ML (ref 0.4–4)
WBC # BLD AUTO: 8.95 K/UL (ref 3.9–12.7)

## 2025-06-18 PROCEDURE — 85025 COMPLETE CBC W/AUTO DIFF WBC: CPT

## 2025-06-18 PROCEDURE — 83036 HEMOGLOBIN GLYCOSYLATED A1C: CPT

## 2025-06-18 PROCEDURE — 84443 ASSAY THYROID STIM HORMONE: CPT

## 2025-06-18 PROCEDURE — 80053 COMPREHEN METABOLIC PANEL: CPT

## 2025-06-18 PROCEDURE — 36415 COLL VENOUS BLD VENIPUNCTURE: CPT | Mod: PO

## 2025-06-18 PROCEDURE — 80061 LIPID PANEL: CPT

## 2025-06-19 ENCOUNTER — RESULTS FOLLOW-UP (OUTPATIENT)
Dept: FAMILY MEDICINE | Facility: CLINIC | Age: 75
End: 2025-06-19

## 2025-07-09 ENCOUNTER — TELEPHONE (OUTPATIENT)
Dept: FAMILY MEDICINE | Facility: CLINIC | Age: 75
End: 2025-07-09
Payer: MEDICARE

## 2025-07-09 NOTE — TELEPHONE ENCOUNTER
Copied from CRM #[id number]. Topic: Medications - Medication Refill  >> Jul 9, 2025 11:26 AM Noelle wrote:  Type:  RX Refill Request    Who Called: pt     Refill or New Rx:refills     RX Name and Strength:atenoloL (TENORMIN) 25 MG tablet  olmesartan (BENICAR) 20 MG tablet    How is the patient currently taking it? (ex. 1XDay):as directed ( atenolol 2 times a day and the olmasartan once a day)     Is this a 30 day or 90 day RX:90    Preferred Pharmacy with phone number:  [facility] #[id number] - [place] - [address]  [address] [address]  Phone: [phone] Fax: [phone]       Local or Mail Order:local     Ordering Provider:Torin     Would the patient rather a call back or a response via [facility]? Call     Best Call Back Number:[phone]    Additional Information: pt was seen in office last month and was supposed to have these called in and never received them. Please get them sent in asa and let him know so he can follow up with the pharmacy

## 2025-07-09 NOTE — TELEPHONE ENCOUNTER
LVM making aware that medication in question was sent & confirmed receipt by pharmacy 06/03/25. Requested that he contact pharmacy for medication & let us know if any issue obtaining.

## 2025-07-10 ENCOUNTER — TELEPHONE (OUTPATIENT)
Dept: FAMILY MEDICINE | Facility: CLINIC | Age: 75
End: 2025-07-10
Payer: MEDICARE

## 2025-07-10 ENCOUNTER — OFFICE VISIT (OUTPATIENT)
Dept: FAMILY MEDICINE | Facility: CLINIC | Age: 75
End: 2025-07-10
Payer: MEDICARE

## 2025-07-10 VITALS
OXYGEN SATURATION: 96 % | WEIGHT: 315 LBS | SYSTOLIC BLOOD PRESSURE: 126 MMHG | DIASTOLIC BLOOD PRESSURE: 80 MMHG | RESPIRATION RATE: 18 BRPM | HEIGHT: 73 IN | BODY MASS INDEX: 41.75 KG/M2 | HEART RATE: 65 BPM

## 2025-07-10 DIAGNOSIS — L30.9 DERMATITIS: ICD-10-CM

## 2025-07-10 DIAGNOSIS — J01.00 ACUTE NON-RECURRENT MAXILLARY SINUSITIS: Primary | ICD-10-CM

## 2025-07-10 DIAGNOSIS — K08.9 POOR DENTITION: ICD-10-CM

## 2025-07-10 PROCEDURE — 4010F ACE/ARB THERAPY RXD/TAKEN: CPT | Mod: CPTII,S$GLB,,

## 2025-07-10 PROCEDURE — 3288F FALL RISK ASSESSMENT DOCD: CPT | Mod: CPTII,S$GLB,,

## 2025-07-10 PROCEDURE — 1159F MED LIST DOCD IN RCRD: CPT | Mod: CPTII,S$GLB,,

## 2025-07-10 PROCEDURE — 3079F DIAST BP 80-89 MM HG: CPT | Mod: CPTII,S$GLB,,

## 2025-07-10 PROCEDURE — 1101F PT FALLS ASSESS-DOCD LE1/YR: CPT | Mod: CPTII,S$GLB,,

## 2025-07-10 PROCEDURE — 99213 OFFICE O/P EST LOW 20 MIN: CPT | Mod: S$GLB,,,

## 2025-07-10 PROCEDURE — 1160F RVW MEDS BY RX/DR IN RCRD: CPT | Mod: CPTII,S$GLB,,

## 2025-07-10 PROCEDURE — 99999 PR PBB SHADOW E&M-EST. PATIENT-LVL III: CPT | Mod: PBBFAC,,,

## 2025-07-10 PROCEDURE — 3008F BODY MASS INDEX DOCD: CPT | Mod: CPTII,S$GLB,,

## 2025-07-10 PROCEDURE — 1125F AMNT PAIN NOTED PAIN PRSNT: CPT | Mod: CPTII,S$GLB,,

## 2025-07-10 PROCEDURE — 3044F HG A1C LEVEL LT 7.0%: CPT | Mod: CPTII,S$GLB,,

## 2025-07-10 PROCEDURE — 3074F SYST BP LT 130 MM HG: CPT | Mod: CPTII,S$GLB,,

## 2025-07-10 RX ORDER — AMOXICILLIN AND CLAVULANATE POTASSIUM 875; 125 MG/1; MG/1
1 TABLET, FILM COATED ORAL 2 TIMES DAILY
Qty: 14 TABLET | Refills: 0 | Status: SHIPPED | OUTPATIENT
Start: 2025-07-10

## 2025-07-10 NOTE — TELEPHONE ENCOUNTER
Patient reports sinus issues states has infection and would like an antibiotic called in to pharmacy.  Writer advised best practice would be to schedule an appointment for evaluation to confirm diagnosis and to determine the best treatment plan related to this matter.  Patient agreed with recommendation.  Appointment scheduled; patient agreed to appointment date, time, and location.

## 2025-07-10 NOTE — TELEPHONE ENCOUNTER
"CRM # [id number]  Owner: None  Status: Unresolved  Open  Priority: Routine Created on: 07/10/2025 11:06 AM By: Mac Simmons     Primary Information    Source   Jarrett Blanco "Cameron" (Patient)    Subject   Jarrett Blanco "Cameron" (Patient)    Topic   General Inquiry - Patient Advice      Communication   Type:  Needs Medical Advice            Who Called: patient      Symptoms (please be specific): Sinus Issues      How long has patient had these symptoms:  1 day      Pharmacy name and phone #:        [facility] #[id number] - [place]  [address]      [address] [address]      Phone: [phone] Fax: [phone]      Would the patient rather a call back or a response via [facility]? Please ask MD to call in antibiotic      Best Call Back Number: [phone]      Additional Information:       Routing History     From To Priority   07/10/2025 11:07 AM Mac Simmons JR STAFF Routine         "

## 2025-07-10 NOTE — PROGRESS NOTES
Subjective:       Patient ID: Jarrett Blanco is a 75 y.o. male.    Chief Complaint: Pain (L side facial pain x this am )      History of Present Illness    CHIEF COMPLAINT:  Mr. Blanco presents today with left sided facial swelling and soreness.    HISTORY OF PRESENT ILLNESS:  He reports facial swelling and pain that started this morning, originating near a previously root canaled tooth and extending to sinuses around the eye. The area is very sore to touch. He has nasal congestion and intermittent runny nose with minimal cough. He denies fever, chills, sweats, ear pain, and sore throat. He has ongoing dental concerns involving multiple teeth and has an upcoming dental appointment on the 16th.    CHRONIC PAIN:  He has chronic back pain with persistent, widespread body pain radiating down both legs. He has undergone 3-4 back surgeries without symptom resolution. Pain is constant and impacts his quality of life.    DERMATOLOGIC:  He has a chronic, severely itchy rash being treated by Dr. Parisi for approximately one year. The rash appears in sporadic spots, particularly difficult to reach on his back. Steroids provide temporary relief but symptoms recur. Family history is positive for similar condition in sister and nephew.             Past Medical History:   Diagnosis Date    Cancer     Kidney - right nephrectomy    Coronary artery disease     Hypertension     Pacemaker        Review of patient's allergies indicates:  No Known Allergies    Current Medications[1]    Review of Systems   Constitutional:  Negative for appetite change, chills, diaphoresis and fever.   HENT:  Positive for congestion, dental problem, rhinorrhea, sinus pressure and sinus pain. Negative for ear pain and sore throat.    Respiratory:  Negative for cough.    Musculoskeletal:  Positive for arthralgias and back pain. Negative for myalgias.   Neurological:  Negative for headaches.        Objective:        Physical Exam  Vitals reviewed.  "  Constitutional:       General: He is not in acute distress.     Appearance: Normal appearance.   HENT:      Head: Normocephalic and atraumatic.        Right Ear: There is impacted cerumen.      Left Ear: There is impacted cerumen.      Mouth/Throat:      Dentition: Abnormal dentition. Dental caries present.      Pharynx: No posterior oropharyngeal erythema.   Eyes:      Conjunctiva/sclera: Conjunctivae normal.   Cardiovascular:      Rate and Rhythm: Normal rate and regular rhythm.      Heart sounds: Normal heart sounds.   Pulmonary:      Effort: Pulmonary effort is normal.      Breath sounds: Normal breath sounds.   Musculoskeletal:         General: Normal range of motion.      Cervical back: Normal range of motion.   Lymphadenopathy:      Cervical: No cervical adenopathy.   Skin:     General: Skin is warm and dry.   Neurological:      Mental Status: He is alert and oriented to person, place, and time.   Psychiatric:         Behavior: Behavior normal.           Visit Vitals  /80 (BP Location: Right arm, Patient Position: Sitting)   Pulse 65   Resp 18   Ht 6' 1" (1.854 m)   Wt (!) 143.8 kg (317 lb 0.3 oz)   SpO2 96%   BMI 41.83 kg/m²      Assessment:         1. Acute non-recurrent maxillary sinusitis    2. Poor dentition    3. Dermatitis        Plan:         Assessment & Plan    Assessed facial swelling, likely due to infection originating from tooth or sinus.  Considered possibility of deeper infection requiring CT if symptoms worsen.  Patient declines CT sinus imaging unless symptoms worsen         Jarrett Cervantes" was seen today for pain.    Diagnoses and all orders for this visit:    Acute non-recurrent maxillary sinusitis  -     amoxicillin-clavulanate 875-125mg (AUGMENTIN) 875-125 mg per tablet; Take 1 tablet by mouth 2 (two) times daily.  -      Offered CT face/ sinuses. Pt declines at this time. Elects to try antibiotics first. Discussed ED precautions in detail.    Poor dentition  -     " amoxicillin-clavulanate 875-125mg (AUGMENTIN) 875-125 mg per tablet; Take 1 tablet by mouth 2 (two) times daily.    Dermatitis          -   Following with dermatology. Discussed possibly referring to allergy if symptoms persist.       Follow up if symptoms worsen or fail to improve as anticipated.           Family Medicine Physician Assistant     Future Appointments       Date Provider Specialty Appt Notes    6/9/2026 Julian Harkins MD Family Medicine annual             Tests to Keep You Healthy    Colon Cancer Screening: DUE  Last Blood Pressure <= 139/89 (7/10/2025): Yes       I spent a total of 20 minutes on the day of the visit.This includes face to face time and non-face to face time preparing to see the patient (eg, review of tests), obtaining and/or reviewing separately obtained history, documenting clinical information in the electronic or other health record, independently interpreting results and communicating results to the patient/family/caregiver, or care coordinator.    This note was generated with the assistance of ambient listening technology. Verbal consent was obtained by the patient and accompanying visitor(s) for the recording of patient appointment to facilitate this note. I attest to having reviewed and edited the generated note for accuracy, though some syntax or spelling errors may persist. Please contact the author of this note for any clarification.         [1]   Current Outpatient Medications:     atenoloL (TENORMIN) 25 MG tablet, Take 1 tablet (25 mg total) by mouth 2 (two) times daily., Disp: 180 tablet, Rfl: 3    ibuprofen (ADVIL,MOTRIN) 200 MG tablet, Take 200 mg by mouth every 6 (six) hours as needed for Pain., Disp: , Rfl:     olmesartan (BENICAR) 20 MG tablet, Take 1 tablet (20 mg total) by mouth once daily., Disp: 90 tablet, Rfl: 3    rosuvastatin (CRESTOR) 20 MG tablet, Take 1 tablet (20 mg total) by mouth once daily., Disp: 90 tablet, Rfl: 3    amoxicillin-clavulanate  875-125mg (AUGMENTIN) 875-125 mg per tablet, Take 1 tablet by mouth 2 (two) times daily., Disp: 14 tablet, Rfl: 0

## 2025-07-21 ENCOUNTER — HOSPITAL ENCOUNTER (OUTPATIENT)
Dept: CARDIOLOGY | Facility: CLINIC | Age: 75
Discharge: HOME | End: 2025-07-21
Attending: INTERNAL MEDICINE
Payer: MEDICARE

## 2025-07-21 ENCOUNTER — CLINICAL SUPPORT (OUTPATIENT)
Dept: CARDIOLOGY | Facility: CLINIC | Age: 75
End: 2025-07-21

## 2025-07-21 DIAGNOSIS — Z95.0 PRESENCE OF CARDIAC PACEMAKER: ICD-10-CM

## 2025-07-21 DIAGNOSIS — R00.1 BRADYCARDIA, UNSPECIFIED: ICD-10-CM

## 2025-07-21 PROCEDURE — 93296 REM INTERROG EVL PM/IDS: CPT | Mod: PN | Performed by: INTERNAL MEDICINE

## 2025-07-21 PROCEDURE — 93294 REM INTERROG EVL PM/LDLS PM: CPT | Mod: S$GLB,,, | Performed by: INTERNAL MEDICINE

## 2025-08-01 LAB
OHS CV AF BURDEN PERCENT: < 1
OHS CV AF BURDEN PERCENT: < 1
OHS CV DC REMOTE DEVICE TYPE: NORMAL
OHS CV DC REMOTE DEVICE TYPE: NORMAL
OHS CV RV PACING PERCENT: 0 %
OHS CV RV PACING PERCENT: 0 %

## (undated) DEVICE — SYS RETRACTOR SPINAL 3V MARS
Type: IMPLANTABLE DEVICE | Site: SPINE LUMBAR | Status: NON-FUNCTIONAL
Removed: 2018-09-10

## (undated) DEVICE — EVACUATOR WOUND BULB 100CC

## (undated) DEVICE — GAUZE SPONGE 4X4 12PLY

## (undated) DEVICE — FORCEP BPLR BAYONETTED STR

## (undated) DEVICE — DRAPE STERI-DRAPE 1000 17X11IN

## (undated) DEVICE — DRAPE STERI INSTRUMENT 1018

## (undated) DEVICE — DRESSING TRANS 4X4 TEGADERM

## (undated) DEVICE — SUT MCRYL PLUS 4-0 PS2 27IN

## (undated) DEVICE — SEE MEDLINE ITEM 152622

## (undated) DEVICE — ELECTRODE BLADE INSULATED 1 IN

## (undated) DEVICE — BONE MARROW ASPIRATION NEEDLE

## (undated) DEVICE — STAPLER SKIN PROXIMATE WIDE

## (undated) DEVICE — PACK SET UP CONVERTORS

## (undated) DEVICE — COVER LIGHT HANDLE 80/CA

## (undated) DEVICE — SEE MEDLINE ITEM 154981

## (undated) DEVICE — ELECTRODE REM PLYHSV RETURN 9

## (undated) DEVICE — DRAPE C-ARMOR EQUIPMENT COVER

## (undated) DEVICE — SUT VICRYL PLUS 2-0 CT1 18

## (undated) DEVICE — COVER SNAP 36IN X 30IN

## (undated) DEVICE — KIT SURGIFLO HEMOSTATIC MATRIX

## (undated) DEVICE — DRAPE ABDOMINAL TIBURON 14X11

## (undated) DEVICE — TUBE FRAZIER 5MM 2FT SOFT TIP

## (undated) DEVICE — KIT SPINAL PATIENT CARE JACK

## (undated) DEVICE — DRESSING AQUACEL FOAM 5 X 5

## (undated) DEVICE — DRESSING AQUACEL SACRAL 9 X 9

## (undated) DEVICE — SEE MEDLINE ITEM 146313

## (undated) DEVICE — SEE MEDLINE ITEM 156905

## (undated) DEVICE — Device

## (undated) DEVICE — SYS ILLUMINATION MARS3V SPINE
Type: IMPLANTABLE DEVICE | Site: SPINE LUMBAR | Status: NON-FUNCTIONAL
Removed: 2018-09-10

## (undated) DEVICE — DRESSING MEPILEX BORDER 4 X 4

## (undated) DEVICE — DIFFUSER

## (undated) DEVICE — SEE MEDLINE ITEM 157150

## (undated) DEVICE — TRAY FOLEY 16FR INFECTION CONT

## (undated) DEVICE — DRESSING TELFA N ADH 3X8

## (undated) DEVICE — CORD BIPOLAR 12 FOOT

## (undated) DEVICE — SPONGE GAUZE 16PLY 4X4

## (undated) DEVICE — DRESSING ABSRBNT ISLAND 3.6X8

## (undated) DEVICE — WIRE K BLUNT TIP 1.5X500MM
Type: IMPLANTABLE DEVICE | Site: SPINE LUMBAR | Status: NON-FUNCTIONAL
Removed: 2018-09-10

## (undated) DEVICE — MARKER SKIN STND TIP BLUE BARR

## (undated) DEVICE — SHIM DISC STAINLESS STEEL 3VL
Type: IMPLANTABLE DEVICE | Site: SPINE LUMBAR | Status: NON-FUNCTIONAL
Removed: 2018-09-10

## (undated) DEVICE — CARTRIDGE OIL

## (undated) DEVICE — DRAPE INCISE IOBAN 2 23X17IN

## (undated) DEVICE — SUT VICRYL PLUS 0 CT1 18IN

## (undated) DEVICE — KIT PEDICLE ACCESS
Type: IMPLANTABLE DEVICE | Site: SPINE LUMBAR | Status: NON-FUNCTIONAL
Removed: 2018-09-10

## (undated) DEVICE — SPONGE LAP 18X18 PREWASHED

## (undated) DEVICE — SEE MEDLINE ITEM 157131

## (undated) DEVICE — BLADE ELECTRO EDGE INSULATED

## (undated) DEVICE — NDL JAMSHIDI 10GA